# Patient Record
Sex: FEMALE | Race: WHITE | NOT HISPANIC OR LATINO | Employment: FULL TIME | URBAN - METROPOLITAN AREA
[De-identification: names, ages, dates, MRNs, and addresses within clinical notes are randomized per-mention and may not be internally consistent; named-entity substitution may affect disease eponyms.]

---

## 2018-03-05 ENCOUNTER — OFFICE VISIT (OUTPATIENT)
Dept: OBGYN CLINIC | Facility: MEDICAL CENTER | Age: 28
End: 2018-03-05
Payer: COMMERCIAL

## 2018-03-05 VITALS
DIASTOLIC BLOOD PRESSURE: 84 MMHG | BODY MASS INDEX: 30.36 KG/M2 | WEIGHT: 165 LBS | HEIGHT: 62 IN | SYSTOLIC BLOOD PRESSURE: 120 MMHG

## 2018-03-05 DIAGNOSIS — O30.041 DICHORIONIC DIAMNIOTIC TWIN PREGNANCY IN FIRST TRIMESTER: ICD-10-CM

## 2018-03-05 DIAGNOSIS — O99.210 MATERNAL OBESITY AFFECTING PREGNANCY, ANTEPARTUM: ICD-10-CM

## 2018-03-05 DIAGNOSIS — N91.2 AMENORRHEA: Primary | ICD-10-CM

## 2018-03-05 DIAGNOSIS — Z34.90 UNPLANNED PREGNANCY: ICD-10-CM

## 2018-03-05 PROBLEM — N39.0 RECURRENT UTI: Status: ACTIVE | Noted: 2017-09-07

## 2018-03-05 PROBLEM — Z34.00 SUPERVISION OF NORMAL FIRST PREGNANCY: Status: RESOLVED | Noted: 2018-02-22 | Resolved: 2018-03-05

## 2018-03-05 PROBLEM — Z34.00 SUPERVISION OF NORMAL FIRST PREGNANCY: Status: ACTIVE | Noted: 2018-02-22

## 2018-03-05 PROBLEM — R42 VERTIGO: Status: ACTIVE | Noted: 2018-02-07

## 2018-03-05 PROBLEM — H90.3: Status: ACTIVE | Noted: 2018-02-07

## 2018-03-05 PROBLEM — K08.9 POOR DENTITION: Status: ACTIVE | Noted: 2018-03-05

## 2018-03-05 PROCEDURE — 76817 TRANSVAGINAL US OBSTETRIC: CPT | Performed by: NURSE PRACTITIONER

## 2018-03-05 PROCEDURE — 99213 OFFICE O/P EST LOW 20 MIN: CPT | Performed by: NURSE PRACTITIONER

## 2018-03-05 RX ORDER — SERTRALINE HYDROCHLORIDE 100 MG/1
100 TABLET, FILM COATED ORAL DAILY
Refills: 5 | COMMUNITY
Start: 2018-01-03 | End: 2018-08-20

## 2018-03-05 RX ORDER — CYCLOBENZAPRINE HCL 10 MG
10 TABLET ORAL
Refills: 1 | COMMUNITY
Start: 2018-01-03 | End: 2018-03-05 | Stop reason: ALTCHOICE

## 2018-03-05 RX ORDER — ASPIRIN 81 MG/1
TABLET ORAL
COMMUNITY
Start: 2018-02-22 | End: 2018-08-07 | Stop reason: SDDI

## 2018-03-05 RX ORDER — FLUTICASONE PROPIONATE 50 MCG
2 SPRAY, SUSPENSION (ML) NASAL
COMMUNITY
Start: 2017-09-07

## 2018-03-05 RX ORDER — CETIRIZINE HYDROCHLORIDE 10 MG/1
10 TABLET ORAL
COMMUNITY
Start: 2016-08-30

## 2018-03-05 RX ORDER — ALBUTEROL SULFATE 90 UG/1
AEROSOL, METERED RESPIRATORY (INHALATION)
COMMUNITY
Start: 2017-03-31

## 2018-03-05 RX ORDER — VITAMIN A ACETATE, .BETA.-CAROTENE, ASCORBIC ACID, CHOLECALCIFEROL, .ALPHA.-TOCOPHEROL ACETATE, DL-, THIAMINE MONONITRATE, RIBOFLAVIN, NIACINAMIDE, PYRIDOXINE HYDROCHLORIDE, FOLIC ACID, CYANOCOBALAMIN, CALCIUM CARBONATE, FERROUS FUMARATE, ZINC OXIDE, AND CUPRIC OXIDE 2000; 2000; 120; 400; 22; 1.84; 3; 20; 10; 1; 12; 200; 27; 25; 2 [IU]/1; [IU]/1; MG/1; [IU]/1; MG/1; MG/1; MG/1; MG/1; MG/1; MG/1; UG/1; MG/1; MG/1; MG/1; MG/1
TABLET ORAL
COMMUNITY
End: 2022-06-24 | Stop reason: ALTCHOICE

## 2018-03-05 RX ORDER — HYDROXYZINE HYDROCHLORIDE 10 MG/1
10 TABLET, FILM COATED ORAL
COMMUNITY
Start: 2017-03-31 | End: 2018-08-20

## 2018-03-05 RX ORDER — SULFAMETHOXAZOLE AND TRIMETHOPRIM 800; 160 MG/1; MG/1
TABLET ORAL
Refills: 0 | COMMUNITY
Start: 2018-01-03 | End: 2018-03-05 | Stop reason: ALTCHOICE

## 2018-03-05 RX ORDER — SERTRALINE HYDROCHLORIDE 100 MG/1
TABLET, FILM COATED ORAL
COMMUNITY
Start: 2018-01-03 | End: 2018-03-14 | Stop reason: SDUPTHER

## 2018-03-05 RX ORDER — MONTELUKAST SODIUM 10 MG/1
10 TABLET ORAL
COMMUNITY
Start: 2017-09-07 | End: 2018-08-20

## 2018-03-05 NOTE — PROGRESS NOTES
EARLY PREGNANCY ULTRASOUND    SUBJECTIVE    HPI: Irene Og is a 28 y o  primigravida new patient female here today for early pregnancy ultrasound  She is accompanied by Lionel ALVES  Patient's last menstrual period was 12/23/2017 (exact date)  Menses are regular  This pregnancy was unplanned but welcomed  PMHx is significant for obesity & depression  Unremarkable PSHx  Denies a family history of birth defects or intellectual defects, cousin did have spina bifida  Did have varicella as a child  Taking a prenatal vitamin  Had been taking sertraline for depression as Rx'd by her PCP however stopped cold turkey 1 month ago due to pregnancy and says she is doing fine without it  Currently works at [employer], will be starting at [employer] soon  Symptoms of pregnancy: nausea  Denies history of STI or abnormal pap smear      Allergies   Allergen Reactions    Poison Ivy Extract        Current Outpatient Prescriptions:     cetirizine (ZyrTEC) 10 mg tablet, Take 10 mg by mouth, Disp: , Rfl:     hydrOXYzine HCL (ATARAX) 10 mg tablet, Take 10 mg by mouth, Disp: , Rfl:     montelukast (SINGULAIR) 10 mg tablet, Take 10 mg by mouth, Disp: , Rfl:     Prenatal Vit-Fe Fumarate-FA (PNV PRENATAL PLUS MULTIVITAMIN) 27-1 MG TABS, Take by mouth, Disp: , Rfl:     albuterol (PROVENTIL HFA,VENTOLIN HFA) 90 mcg/act inhaler, Two puffs every four hours as needed for wheezing, Disp: , Rfl:     aspirin (ECOTRIN LOW STRENGTH) 81 mg EC tablet, , Disp: , Rfl:     fluticasone (FLONASE) 50 mcg/act nasal spray, 2 sprays into each nostril, Disp: , Rfl:     sertraline (ZOLOFT) 100 mg tablet, 1 by mouth once daily, Disp: , Rfl:         OBJECTIVE  Vitals:    03/05/18 1039   BP: 120/84   BP Location: Left arm   Patient Position: Sitting   Weight: 74 8 kg (165 lb)   Height: 5' 2" (1 575 m)         Early OB Ultrasound Procedure Note: Transvaginal US    Technician: Study performed by the interpreting NP    Indications:  Early gestation, dating & viability    Procedure Details   The entire study was done at settings of 6 0 to 8 0 MHz  TWIN A  Gestational Sac: Present  Yolk sac: Present  Crown-rump length is 3 08cm and calculates to an estimated gestational age of 9 weeks, 0 days  Embryonic cardiac activity is seen at a rate of 171 b/min  Description of fetal anatomy Normal     TWIN B  Gestational Sac: Present  Yolk sac: Present  Crown-rump length is 2 91cm and calculates to an estimated gestational age of 10 weeks, 5 days  Embryonic cardiac activity is seen at a rate of 171 b/min  Description of fetal anatomy Normal     Cul-de-sac: no fluid  Description of ovaries: CL on right ovary  L appears normal   Description of uterus: Anteverted in position  Description of cervix: Appears normal    Findings:  Viable twin intrauterine pregnancy  Appears dichorionic, diamniotic (thick intertwin membrane)      ASSESSMENT  Early TWIN (surprise!) pregnancy at 10 weeks 2 days with a calculated SHUKRI of 9/29/2018 based on LMP      PLAN  1 - Referral to MFM provided today to discuss genetic screening options for fetuses, along with indications of (1) twin pregnancy and confirm chorionicity; (2) maternal obesity  2 - RTO for OB interview and PN-1 visit        All questions were answered & the couple expressed understanding      Austin Davis

## 2018-03-06 ENCOUNTER — TELEPHONE (OUTPATIENT)
Dept: OBGYN CLINIC | Facility: CLINIC | Age: 28
End: 2018-03-06

## 2018-03-06 NOTE — TELEPHONE ENCOUNTER
Patient called, she is 10 weeks pregnant and said she had cramping  She has been constipated and just had a bowel movement and feels better but still is crampy  She said she is not drinking as much as she should  She was seen yesterday and had an Us, pregnant with twins  She is suppose to see [facility] tomorrow  I told her to go home, rest, hydrate and call us if any increase in her cramping

## 2018-03-12 ENCOUNTER — INITIAL PRENATAL (OUTPATIENT)
Dept: OBGYN CLINIC | Facility: CLINIC | Age: 28
End: 2018-03-12

## 2018-03-12 VITALS — DIASTOLIC BLOOD PRESSURE: 70 MMHG | BODY MASS INDEX: 30.18 KG/M2 | SYSTOLIC BLOOD PRESSURE: 122 MMHG | WEIGHT: 165 LBS

## 2018-03-12 DIAGNOSIS — O30.001 TWIN GESTATION IN FIRST TRIMESTER, UNSPECIFIED MULTIPLE GESTATION TYPE: ICD-10-CM

## 2018-03-12 DIAGNOSIS — Z34.90 UNPLANNED PREGNANCY: ICD-10-CM

## 2018-03-12 DIAGNOSIS — Z34.01 ENCOUNTER FOR SUPERVISION OF NORMAL FIRST PREGNANCY IN FIRST TRIMESTER: Primary | ICD-10-CM

## 2018-03-12 PROCEDURE — OBC: Performed by: OBSTETRICS & GYNECOLOGY

## 2018-03-12 NOTE — PROGRESS NOTES
Pt and her Mother seen in the office for OB intake  Pregnancy was unplanned, TWINs a surprise! FOB is involved and pt states family is accepting  Pt recently left job at [employer] and has started works at [employer] with Mother - they only have 1 car and transportation is a barrier  Pt reports current depression sx, but d/cd medication with pregnancy, states she is managing fine without  She has a pending apt with MFM this week for u/s - she is interested in genetic testing given maternal family hx of Spina Bifida  All information was presented and all questions were answered  Routine labwork ordered  Pt plans to try breast feeding  She was offered and advised to have flu vaccine and did decline

## 2018-03-14 ENCOUNTER — ULTRASOUND (OUTPATIENT)
Dept: PERINATAL CARE | Facility: CLINIC | Age: 28
End: 2018-03-14
Payer: COMMERCIAL

## 2018-03-14 VITALS
SYSTOLIC BLOOD PRESSURE: 129 MMHG | DIASTOLIC BLOOD PRESSURE: 63 MMHG | WEIGHT: 164.2 LBS | HEART RATE: 97 BPM | BODY MASS INDEX: 30.22 KG/M2 | HEIGHT: 62 IN

## 2018-03-14 DIAGNOSIS — O30.041 DICHORIONIC DIAMNIOTIC TWIN PREGNANCY IN FIRST TRIMESTER: ICD-10-CM

## 2018-03-14 DIAGNOSIS — Z3A.11 11 WEEKS GESTATION OF PREGNANCY: Primary | ICD-10-CM

## 2018-03-14 DIAGNOSIS — O99.210 MATERNAL OBESITY AFFECTING PREGNANCY, ANTEPARTUM: ICD-10-CM

## 2018-03-14 PROCEDURE — 76801 OB US < 14 WKS SINGLE FETUS: CPT | Performed by: OBSTETRICS & GYNECOLOGY

## 2018-03-14 PROCEDURE — 99241 PR OFFICE CONSULTATION NEW/ESTAB PATIENT 15 MIN: CPT | Performed by: OBSTETRICS & GYNECOLOGY

## 2018-03-14 NOTE — PROGRESS NOTES
Dichorionic diamniotic twin pregnancy  Please see her ultrasound report under the  ob procedure tab    Urbano Feliciano MD

## 2018-03-20 ENCOUNTER — INITIAL PRENATAL (OUTPATIENT)
Dept: OBGYN CLINIC | Facility: MEDICAL CENTER | Age: 28
End: 2018-03-20

## 2018-03-20 ENCOUNTER — TELEPHONE (OUTPATIENT)
Dept: OBGYN CLINIC | Facility: CLINIC | Age: 28
End: 2018-03-20

## 2018-03-20 VITALS — BODY MASS INDEX: 29.96 KG/M2 | DIASTOLIC BLOOD PRESSURE: 72 MMHG | WEIGHT: 163.8 LBS | SYSTOLIC BLOOD PRESSURE: 144 MMHG

## 2018-03-20 DIAGNOSIS — O30.041 DICHORIONIC DIAMNIOTIC TWIN PREGNANCY IN FIRST TRIMESTER: Primary | ICD-10-CM

## 2018-03-20 PROCEDURE — PNV: Performed by: OBSTETRICS & GYNECOLOGY

## 2018-03-20 PROCEDURE — G0145 SCR C/V CYTO,THINLAYER,RESCR: HCPCS | Performed by: OBSTETRICS & GYNECOLOGY

## 2018-03-20 NOTE — TELEPHONE ENCOUNTER
Pt would like a call today- she has been having pain near her kidneys all day, helen  On right side, feels constipated and thinks might have a uti  Seen today in [place]-feels her concerns were not addressed at the appt

## 2018-03-20 NOTE — PROGRESS NOTES
Seen by perinatology   still also go for labs   has some dysuria   discuss twin gestation with patient   will start her calcium iron and folic acid    Andrews Larson is here for her first prenatal visit  Age: 28 y o  LMP: Patient's last menstrual period was 2017 (exact date)  Gestational age 14w4d based on LMP Yes , early US No   1  Para 0         Previous C Section: No  She denies nausea and vomiting  She has had no vaginal bleeding  Patients has no complaints  She  is planning consultation with maternal fetal medicine for a sequential screen and genetic screening  Allergies: Poison ivy extract  Medication use :   Current Outpatient Prescriptions   Medication Sig Dispense Refill    albuterol (PROVENTIL HFA,VENTOLIN HFA) 90 mcg/act inhaler Two puffs every four hours as needed for wheezing      aspirin (ECOTRIN LOW STRENGTH) 81 mg EC tablet       cetirizine (ZyrTEC) 10 mg tablet Take 10 mg by mouth      fluticasone (FLONASE) 50 mcg/act nasal spray 2 sprays into each nostril      hydrOXYzine HCL (ATARAX) 10 mg tablet Take 10 mg by mouth      montelukast (SINGULAIR) 10 mg tablet Take 10 mg by mouth      Prenatal Vit-Fe Fumarate-FA (PNV PRENATAL PLUS MULTIVITAMIN) 27-1 MG TABS Take by mouth      sertraline (ZOLOFT) 100 mg tablet Take 100 mg by mouth daily  5     No current facility-administered medications for this visit  She is a non-smoker  In this pregnancy her  medical history is significant for none    Her obstetrical, medical, surgical and family history was reviewed  Her physical exam was normal  A Pap smear was obtained, Gonorrhea and Chlamydia testing was not obtained    Discussed as well during this visit was diet, prenatal vitamins, prenatal visits, lab testing, breast feeding, vaccinations, maternal fetal medicine consultations, prevention of exposure to infectious diseases and toxic chemicals, lifestyle      Risk factors for this pregnancy include: d/di twins

## 2018-03-22 ENCOUNTER — ROUTINE PRENATAL (OUTPATIENT)
Dept: PERINATAL CARE | Facility: CLINIC | Age: 28
End: 2018-03-22
Payer: COMMERCIAL

## 2018-03-22 VITALS
SYSTOLIC BLOOD PRESSURE: 129 MMHG | HEART RATE: 93 BPM | BODY MASS INDEX: 30.11 KG/M2 | DIASTOLIC BLOOD PRESSURE: 79 MMHG | WEIGHT: 163.6 LBS | HEIGHT: 62 IN

## 2018-03-22 DIAGNOSIS — Z3A.12 12 WEEKS GESTATION OF PREGNANCY: ICD-10-CM

## 2018-03-22 DIAGNOSIS — Z36.82 ENCOUNTER FOR ANTENATAL SCREENING FOR NUCHAL TRANSLUCENCY: ICD-10-CM

## 2018-03-22 DIAGNOSIS — O30.041 DICHORIONIC DIAMNIOTIC TWIN PREGNANCY IN FIRST TRIMESTER: Primary | ICD-10-CM

## 2018-03-22 PROCEDURE — 76802 OB US < 14 WKS ADDL FETUS: CPT | Performed by: OBSTETRICS & GYNECOLOGY

## 2018-03-22 PROCEDURE — 76813 OB US NUCHAL MEAS 1 GEST: CPT | Performed by: OBSTETRICS & GYNECOLOGY

## 2018-03-22 PROCEDURE — 76801 OB US < 14 WKS SINGLE FETUS: CPT | Performed by: OBSTETRICS & GYNECOLOGY

## 2018-03-22 PROCEDURE — 76814 OB US NUCHAL MEAS ADD-ON: CPT | Performed by: OBSTETRICS & GYNECOLOGY

## 2018-03-22 PROCEDURE — 99212 OFFICE O/P EST SF 10 MIN: CPT | Performed by: OBSTETRICS & GYNECOLOGY

## 2018-03-22 NOTE — PROGRESS NOTES
Please refer to the [facility] ultrasound report in Ob Procedures for additional information regarding the visit to the [facility]  today

## 2018-03-22 NOTE — TELEPHONE ENCOUNTER
Called pt -L/M for pt to call office   Advised pt call office to inform if she has received care at a [facility] center, or if pain has resolved itself

## 2018-03-23 LAB
LAB AP GYN PRIMARY INTERPRETATION: NORMAL
LAB AP LMP: NORMAL
Lab: NORMAL

## 2018-04-12 LAB
# FETUSES US: 2
AGE: NORMAL
COLLECT DATE: NORMAL
CURRENT SMOKER: NO
DONATED EGG PATIENT QL: NO
FET CRL US.MEAS: 61 MM
FET CRL US.MEAS: 61 MM
FET NUCHAL FOLD THICKNESS US.MEAS: 1.6 MM
FET NUCHAL FOLD THICKNESS US.MEAS: 1.7 MM
GA METHOD: NORMAL
HX OF NTD NARR: NO
HX OF TRISOMY 21 NARR: NO
IDDM PATIENT QL: NO
INTEGRATED SCN PATIENT-IMP: NORMAL
PHYSICIAN NPI: NORMAL
SL AMB NASAL BONE: PRESENT
SL AMB NTQR LOCATION ID#: NORMAL
SL AMB NTQR READING PHYS ID#: NORMAL
SL AMB REFERRING PHYSICIAN NAME: NORMAL
SL AMB REFERRING PHYSICIAN PHONE: NORMAL
SL AMB REPEAT SPECIMEN: NO
SL AMB TWIN B NASAL BONE: PRESENT
SONOGRAPHER NAME: NORMAL
SONOGRAPHER: NORMAL
SONOGRAPHER: NORMAL
US DATE: NORMAL
US FETUSES STUDY [IMP]: NORMAL

## 2018-04-13 ENCOUNTER — TELEPHONE (OUTPATIENT)
Dept: PERINATAL CARE | Facility: CLINIC | Age: 28
End: 2018-04-13

## 2018-04-17 ENCOUNTER — LAB (OUTPATIENT)
Dept: LAB | Facility: MEDICAL CENTER | Age: 28
End: 2018-04-17
Payer: COMMERCIAL

## 2018-04-17 ENCOUNTER — ROUTINE PRENATAL (OUTPATIENT)
Dept: OBGYN CLINIC | Facility: MEDICAL CENTER | Age: 28
End: 2018-04-17
Payer: COMMERCIAL

## 2018-04-17 ENCOUNTER — TELEPHONE (OUTPATIENT)
Dept: OBGYN CLINIC | Facility: CLINIC | Age: 28
End: 2018-04-17

## 2018-04-17 VITALS — WEIGHT: 163.6 LBS | BODY MASS INDEX: 29.92 KG/M2 | SYSTOLIC BLOOD PRESSURE: 114 MMHG | DIASTOLIC BLOOD PRESSURE: 72 MMHG

## 2018-04-17 DIAGNOSIS — O30.001 TWIN GESTATION IN FIRST TRIMESTER, UNSPECIFIED MULTIPLE GESTATION TYPE: ICD-10-CM

## 2018-04-17 DIAGNOSIS — O30.042 DICHORIONIC DIAMNIOTIC TWIN PREGNANCY IN SECOND TRIMESTER: Primary | ICD-10-CM

## 2018-04-17 DIAGNOSIS — Z34.01 ENCOUNTER FOR SUPERVISION OF NORMAL FIRST PREGNANCY IN FIRST TRIMESTER: ICD-10-CM

## 2018-04-17 DIAGNOSIS — Z3A.11 11 WEEKS GESTATION OF PREGNANCY: ICD-10-CM

## 2018-04-17 PROBLEM — O09.899 RUBELLA NON-IMMUNE STATUS, ANTEPARTUM: Status: ACTIVE | Noted: 2018-04-17

## 2018-04-17 PROBLEM — O30.041 DICHORIONIC DIAMNIOTIC TWIN PREGNANCY IN FIRST TRIMESTER: Status: RESOLVED | Noted: 2018-03-05 | Resolved: 2018-04-17

## 2018-04-17 PROBLEM — Z28.39 RUBELLA NON-IMMUNE STATUS, ANTEPARTUM: Status: ACTIVE | Noted: 2018-04-17

## 2018-04-17 LAB
ABO GROUP BLD: NORMAL
BACTERIA UR QL AUTO: ABNORMAL /HPF
BASOPHILS # BLD AUTO: 0.02 THOUSANDS/ΜL (ref 0–0.1)
BASOPHILS NFR BLD AUTO: 0 % (ref 0–1)
BILIRUB UR QL STRIP: NEGATIVE
BLD GP AB SCN SERPL QL: NEGATIVE
CLARITY UR: CLEAR
COLOR UR: YELLOW
EOSINOPHIL # BLD AUTO: 0.03 THOUSAND/ΜL (ref 0–0.61)
EOSINOPHIL NFR BLD AUTO: 0 % (ref 0–6)
ERYTHROCYTE [DISTWIDTH] IN BLOOD BY AUTOMATED COUNT: 13.8 % (ref 11.6–15.1)
GLUCOSE UR STRIP-MCNC: NEGATIVE MG/DL
HCT VFR BLD AUTO: 36.7 % (ref 34.8–46.1)
HGB BLD-MCNC: 12.6 G/DL (ref 11.5–15.4)
HGB UR QL STRIP.AUTO: NEGATIVE
KETONES UR STRIP-MCNC: NEGATIVE MG/DL
LEUKOCYTE ESTERASE UR QL STRIP: NEGATIVE
LYMPHOCYTES # BLD AUTO: 1.11 THOUSANDS/ΜL (ref 0.6–4.47)
LYMPHOCYTES NFR BLD AUTO: 11 % (ref 14–44)
MCH RBC QN AUTO: 30.4 PG (ref 26.8–34.3)
MCHC RBC AUTO-ENTMCNC: 34.3 G/DL (ref 31.4–37.4)
MCV RBC AUTO: 89 FL (ref 82–98)
MONOCYTES # BLD AUTO: 0.44 THOUSAND/ΜL (ref 0.17–1.22)
MONOCYTES NFR BLD AUTO: 4 % (ref 4–12)
NEUTROPHILS # BLD AUTO: 8.72 THOUSANDS/ΜL (ref 1.85–7.62)
NEUTS SEG NFR BLD AUTO: 85 % (ref 43–75)
NITRITE UR QL STRIP: NEGATIVE
NON-SQ EPI CELLS URNS QL MICRO: ABNORMAL /HPF
NRBC BLD AUTO-RTO: 0 /100 WBCS
PH UR STRIP.AUTO: 6.5 [PH] (ref 4.5–8)
PLATELET # BLD AUTO: 195 THOUSANDS/UL (ref 149–390)
PMV BLD AUTO: 11.9 FL (ref 8.9–12.7)
PROT UR STRIP-MCNC: NEGATIVE MG/DL
RBC # BLD AUTO: 4.14 MILLION/UL (ref 3.81–5.12)
RBC #/AREA URNS AUTO: ABNORMAL /HPF
RH BLD: POSITIVE
RUBV IGG SERPL IA-ACNC: 9.5 IU/ML
SP GR UR STRIP.AUTO: 1.02 (ref 1–1.03)
SPECIMEN EXPIRATION DATE: NORMAL
UROBILINOGEN UR QL STRIP.AUTO: 0.2 E.U./DL
WBC # BLD AUTO: 10.37 THOUSAND/UL (ref 4.31–10.16)
WBC #/AREA URNS AUTO: ABNORMAL /HPF

## 2018-04-17 PROCEDURE — 36415 COLL VENOUS BLD VENIPUNCTURE: CPT

## 2018-04-17 PROCEDURE — 86706 HEP B SURFACE ANTIBODY: CPT

## 2018-04-17 PROCEDURE — 80081 OBSTETRIC PANEL INC HIV TSTG: CPT

## 2018-04-17 PROCEDURE — 99213 OFFICE O/P EST LOW 20 MIN: CPT | Performed by: OBSTETRICS & GYNECOLOGY

## 2018-04-17 PROCEDURE — 81001 URINALYSIS AUTO W/SCOPE: CPT

## 2018-04-17 PROCEDURE — 87086 URINE CULTURE/COLONY COUNT: CPT

## 2018-04-17 NOTE — PROGRESS NOTES
Please call the patient regarding her abnormal result  Rubella immune in pregnancy - please review transmission precautions  Recommend vaccination postpartum  Thanks!

## 2018-04-17 NOTE — TELEPHONE ENCOUNTER
----- Message from Amado Wells sent at 4/17/2018  4:23 PM EDT -----  Please call the patient regarding her abnormal result  Rubella immune in pregnancy - please review transmission precautions  Recommend vaccination postpartum  Thanks!

## 2018-04-17 NOTE — PROGRESS NOTES
Just started a new job at [employer] and they need a note to say she can have snacks at her station  Level 2 scheduled

## 2018-04-17 NOTE — LETTER
April 17, 2018     Patient: Lou Ruff   YOB: 1990   Date of Visit: 4/17/2018       To Whom it May Concern:    Kurt Goodpasture is under my professional care  She was seen in my office on 4/17/2018  Due to her ongoing pregnancy she is advised to eat frequently  She should be allowed to have snack at her work station  If you have any questions or concerns, please don't hesitate to call           Sincerely,          Imelda Rojo MD        CC: No Recipients

## 2018-04-17 NOTE — PROGRESS NOTES
Patient is doing well she has not gone for lab test at   a note was written for work to have snacks at her workstation   dealing with stress,  In past had trouble using Zoloft due to stomach upset    I suggested psychology services

## 2018-04-18 ENCOUNTER — TELEPHONE (OUTPATIENT)
Dept: OBGYN CLINIC | Facility: CLINIC | Age: 28
End: 2018-04-18

## 2018-04-18 DIAGNOSIS — Z34.90 PREGNANCY, UNSPECIFIED GESTATIONAL AGE: Primary | ICD-10-CM

## 2018-04-18 LAB
BACTERIA UR CULT: NORMAL
HBV SURFACE AB SER-ACNC: <3.1 MIU/ML
HIV 1+2 AB+HIV1 P24 AG SERPL QL IA: NORMAL
RPR SER QL: NORMAL

## 2018-04-18 NOTE — TELEPHONE ENCOUNTER
----- Message from [facility] sent at 4/18/2018 10:08 AM EDT -----  She will need HbsAg  Thanks

## 2018-04-19 LAB — HBV SURFACE AG SER QL: NORMAL

## 2018-04-27 ENCOUNTER — TELEPHONE (OUTPATIENT)
Dept: OBGYN CLINIC | Facility: CLINIC | Age: 28
End: 2018-04-27

## 2018-04-27 NOTE — TELEPHONE ENCOUNTER
Her prenatal labs are unremarkable  Infectious disease testing is all normal/negative  The only lab of note is that she is not-immune to rubella, which Lilliana Chaudhary called her on 4/17 and left a message about (I wanted to review transmission precautions in pregnancy and encourage postpartum vaccination)  That's all  Thanks!

## 2018-04-27 NOTE — TELEPHONE ENCOUNTER
Patient called to inquire about labs done 04/17  Results available, please review and advise  Thanks!

## 2018-05-11 ENCOUNTER — TELEPHONE (OUTPATIENT)
Dept: PERINATAL CARE | Facility: CLINIC | Age: 28
End: 2018-05-11

## 2018-05-11 ENCOUNTER — TELEPHONE (OUTPATIENT)
Dept: OBGYN CLINIC | Facility: CLINIC | Age: 28
End: 2018-05-11

## 2018-05-11 NOTE — TELEPHONE ENCOUNTER
Spoke with [facility] - OB Nurse - the screen she may be wanting is the Quad screen which is offered till 21 wks and it be filled out by a provider or by nurse  Patient has her 20 wk appointment next Friday 05/18 - pt stated she will get the lab order at that time

## 2018-05-11 NOTE — TELEPHONE ENCOUNTER
Patient called for her 28 wk lab results - she is aware of them and the precautions  Will get the injection post partum

## 2018-05-14 ENCOUNTER — ROUTINE PRENATAL (OUTPATIENT)
Dept: PERINATAL CARE | Facility: CLINIC | Age: 28
End: 2018-05-14
Payer: COMMERCIAL

## 2018-05-14 VITALS
HEIGHT: 62 IN | WEIGHT: 168.8 LBS | BODY MASS INDEX: 31.06 KG/M2 | SYSTOLIC BLOOD PRESSURE: 123 MMHG | HEART RATE: 88 BPM | DIASTOLIC BLOOD PRESSURE: 77 MMHG

## 2018-05-14 DIAGNOSIS — Z36.86 ENCOUNTER FOR ANTENATAL SCREENING FOR CERVICAL LENGTH: ICD-10-CM

## 2018-05-14 DIAGNOSIS — Z3A.20 20 WEEKS GESTATION OF PREGNANCY: ICD-10-CM

## 2018-05-14 DIAGNOSIS — O30.042 DICHORIONIC DIAMNIOTIC TWIN PREGNANCY IN SECOND TRIMESTER: Primary | ICD-10-CM

## 2018-05-14 DIAGNOSIS — Z28.39 RUBELLA NON-IMMUNE STATUS, ANTEPARTUM: ICD-10-CM

## 2018-05-14 DIAGNOSIS — O09.899 RUBELLA NON-IMMUNE STATUS, ANTEPARTUM: ICD-10-CM

## 2018-05-14 PROCEDURE — 76812 OB US DETAILED ADDL FETUS: CPT | Performed by: OBSTETRICS & GYNECOLOGY

## 2018-05-14 PROCEDURE — 76817 TRANSVAGINAL US OBSTETRIC: CPT | Performed by: OBSTETRICS & GYNECOLOGY

## 2018-05-14 PROCEDURE — 76811 OB US DETAILED SNGL FETUS: CPT | Performed by: OBSTETRICS & GYNECOLOGY

## 2018-05-14 PROCEDURE — 99212 OFFICE O/P EST SF 10 MIN: CPT | Performed by: OBSTETRICS & GYNECOLOGY

## 2018-05-14 NOTE — PROGRESS NOTES
A transvaginal ultrasound was performed  Sonographer note on use of High Level Disinfection Process (Trophon) for transvaginal probe# 5 used, serial [id number]    Lenora Ariza

## 2018-05-15 PROBLEM — O30.042 DICHORIONIC DIAMNIOTIC TWIN PREGNANCY IN SECOND TRIMESTER: Status: ACTIVE | Noted: 2018-05-15

## 2018-05-15 PROBLEM — O99.341 BIPOLAR DISEASE DURING PREGNANCY IN FIRST TRIMESTER (HCC): Status: ACTIVE | Noted: 2018-04-20

## 2018-05-15 PROBLEM — F31.9 BIPOLAR DISEASE DURING PREGNANCY IN FIRST TRIMESTER (HCC): Status: ACTIVE | Noted: 2018-04-20

## 2018-05-18 ENCOUNTER — ROUTINE PRENATAL (OUTPATIENT)
Dept: OBGYN CLINIC | Facility: MEDICAL CENTER | Age: 28
End: 2018-05-18
Payer: COMMERCIAL

## 2018-05-18 VITALS — DIASTOLIC BLOOD PRESSURE: 70 MMHG | WEIGHT: 171.6 LBS | SYSTOLIC BLOOD PRESSURE: 114 MMHG | BODY MASS INDEX: 31.39 KG/M2

## 2018-05-18 DIAGNOSIS — O30.042 DICHORIONIC DIAMNIOTIC TWIN PREGNANCY IN SECOND TRIMESTER: Primary | ICD-10-CM

## 2018-05-18 PROCEDURE — 99213 OFFICE O/P EST LOW 20 MIN: CPT | Performed by: OBSTETRICS & GYNECOLOGY

## 2018-05-18 NOTE — ASSESSMENT & PLAN NOTE
Feels well  SOB slowly improving  Advised regular use of allergy meds and inhaler PRN  Discussed normal physiologic changes in pregnancy  Denies ctx  Not working anymore

## 2018-05-18 NOTE — PROGRESS NOTES
abd u/s:    Vertex/transverse  Fetal movement X 2 noted  Cardiac activity X 2 noted  Problem List Items Addressed This Visit        Other    Dichorionic diamniotic twin pregnancy in second trimester - Primary     Feels well  SOB slowly improving  Advised regular use of allergy meds and inhaler PRN  Discussed normal physiologic changes in pregnancy  Denies ctx  Not working anymore

## 2018-05-22 ENCOUNTER — TELEPHONE (OUTPATIENT)
Dept: PERINATAL CARE | Facility: CLINIC | Age: 28
End: 2018-05-22

## 2018-05-22 LAB
# FETUSES US: 2
AFP ADJ MOM SERPL: 2.73
AFP SERPL-MCNC: 168.5 NG/ML
AGE: NORMAL
B-HCG ADJ MOM SERPL: 5.76
B-HCG SERPL-ACNC: 97.43 IU/ML
CURRENT SMOKER: NO
DONATED EGG PATIENT QL: NO
FET TS 21 RISK FROM MAT AGE: NORMAL
GA CLIN EST: 21.3 WEEKS
GA METHOD: NORMAL
HX OF NTD NARR: NO
HX OF TRISOMY 21 NARR: NO
IDDM PATIENT QL: NO
INHIBIN A ADJ MOM SERPL: 2.87
INHIBIN A SERPL-MCNC: 579 PG/ML
NEURAL TUBE DEFECT RISK FETUS: NORMAL %
SL AMB REPEAT SPECIMEN: NO
TS 18 RISK FETUS: NORMAL
TS 21 RISK FETUS: NORMAL
U ESTRIOL ADJ MOM SERPL: 1.64
U ESTRIOL SERPL-MCNC: 3.33 NG/ML

## 2018-05-22 NOTE — TELEPHONE ENCOUNTER
----- Message from Warren Lazaro MD sent at 5/22/2018 11:31 AM EDT -----  I have reviewed the results which are normal   Please call patient and notify her of normal results  Thank you

## 2018-06-11 ENCOUNTER — ULTRASOUND (OUTPATIENT)
Dept: PERINATAL CARE | Facility: CLINIC | Age: 28
End: 2018-06-11
Payer: COMMERCIAL

## 2018-06-11 VITALS
WEIGHT: 170.8 LBS | BODY MASS INDEX: 31.43 KG/M2 | SYSTOLIC BLOOD PRESSURE: 123 MMHG | DIASTOLIC BLOOD PRESSURE: 84 MMHG | HEIGHT: 62 IN | HEART RATE: 87 BPM

## 2018-06-11 DIAGNOSIS — Z3A.24 24 WEEKS GESTATION OF PREGNANCY: ICD-10-CM

## 2018-06-11 DIAGNOSIS — O30.042 DICHORIONIC DIAMNIOTIC TWIN PREGNANCY IN SECOND TRIMESTER: Primary | ICD-10-CM

## 2018-06-11 PROCEDURE — 76816 OB US FOLLOW-UP PER FETUS: CPT | Performed by: OBSTETRICS & GYNECOLOGY

## 2018-06-11 PROCEDURE — 99212 OFFICE O/P EST SF 10 MIN: CPT | Performed by: OBSTETRICS & GYNECOLOGY

## 2018-06-11 NOTE — PROGRESS NOTES
Please refer to the [facility] ultrasound report in Ob Procedures for additional information regarding the visit to the [facility]  today

## 2018-06-11 NOTE — LETTER
June 11, 2018     Lui Faulkner, DO  [address] [facility] [address]    Patient: Carin Mata   YOB: 1990   Date of Visit: 6/11/2018       Dear Dr Ximena Maddox: Thank you for referring Kurt Goodpasture to me for evaluation  Below are my notes for this consultation  If you have questions, please do not hesitate to call me  I look forward to following your patient along with you  Sincerely,        Majo Gomez MD        CC: No Recipients  Majo Gomez MD  6/11/2018 11:55 AM  Sign at close encounter  Please refer to the [facility] ultrasound report in Ob Procedures for additional information regarding the visit to the [facility]  today

## 2018-06-11 NOTE — LETTER
June 12, 2018     Adelfo Bonner DO  [address] [address]    Patient: Evie Mata   YOB: 1990   Date of Visit: 6/11/2018       Dear Dr Megan Carter: Thank you for referring Paul Arellano to me for evaluation  Below are my notes for this consultation  If you have questions, please do not hesitate to call me  I look forward to following your patient along with you  Sincerely,        Jt Johnson MD        CC: No Recipients  Jt Johnson MD  6/11/2018 11:55 AM  Sign at close encounter  Please refer to the [facility] ultrasound report in Ob Procedures for additional information regarding the visit to the [facility]  today

## 2018-06-20 ENCOUNTER — TELEPHONE (OUTPATIENT)
Dept: OBGYN CLINIC | Facility: CLINIC | Age: 28
End: 2018-06-20

## 2018-06-22 ENCOUNTER — ROUTINE PRENATAL (OUTPATIENT)
Dept: OBGYN CLINIC | Facility: MEDICAL CENTER | Age: 28
End: 2018-06-22
Payer: COMMERCIAL

## 2018-06-22 VITALS — DIASTOLIC BLOOD PRESSURE: 80 MMHG | SYSTOLIC BLOOD PRESSURE: 140 MMHG | BODY MASS INDEX: 32.08 KG/M2 | WEIGHT: 175.4 LBS

## 2018-06-22 DIAGNOSIS — Z3A.25 25 WEEKS GESTATION OF PREGNANCY: ICD-10-CM

## 2018-06-22 DIAGNOSIS — O30.042 DICHORIONIC DIAMNIOTIC TWIN PREGNANCY IN SECOND TRIMESTER: Primary | ICD-10-CM

## 2018-06-22 PROCEDURE — 99213 OFFICE O/P EST LOW 20 MIN: CPT | Performed by: OBSTETRICS & GYNECOLOGY

## 2018-06-22 NOTE — PROGRESS NOTES
Problem List Items Addressed This Visit        Other    Dichorionic diamniotic twin pregnancy in second trimester - Primary     Fetal movement x 2 seen  Baby A (boy) - Vertex  Baby B (girl) - Breech  Last scan 6 11 - next in 4wks    Briefly discussed routes of delivery  28wk lab slip provided           Other Visit Diagnoses     25 weeks gestation of pregnancy        Relevant Orders    CBC and differential    Glucose, 1H PG    RPR        Diogenes Garza feels well  No concerns today  Babies moving well

## 2018-06-22 NOTE — ASSESSMENT & PLAN NOTE
Fetal movement x 2 seen  Baby A (boy) - Vertex  Baby B (girl) - Breech  Last scan 6 11 - next in 4wks    Briefly discussed routes of delivery    28wk lab slip provided

## 2018-06-27 ENCOUNTER — APPOINTMENT (OUTPATIENT)
Dept: LAB | Facility: MEDICAL CENTER | Age: 28
End: 2018-06-27
Payer: COMMERCIAL

## 2018-06-27 DIAGNOSIS — Z3A.25 25 WEEKS GESTATION OF PREGNANCY: ICD-10-CM

## 2018-06-27 LAB
BASOPHILS # BLD MANUAL: 0 THOUSAND/UL (ref 0–0.1)
BASOPHILS NFR MAR MANUAL: 0 % (ref 0–1)
EOSINOPHIL # BLD MANUAL: 0 THOUSAND/UL (ref 0–0.4)
EOSINOPHIL NFR BLD MANUAL: 0 % (ref 0–6)
ERYTHROCYTE [DISTWIDTH] IN BLOOD BY AUTOMATED COUNT: 13.3 % (ref 11.6–15.1)
GLUCOSE 1H P 50 G GLC PO SERPL-MCNC: 104 MG/DL
HCT VFR BLD AUTO: 34.5 % (ref 34.8–46.1)
HGB BLD-MCNC: 11.2 G/DL (ref 11.5–15.4)
LYMPHOCYTES # BLD AUTO: 0.72 THOUSAND/UL (ref 0.6–4.47)
LYMPHOCYTES # BLD AUTO: 6 % (ref 14–44)
MCH RBC QN AUTO: 30.8 PG (ref 26.8–34.3)
MCHC RBC AUTO-ENTMCNC: 32.5 G/DL (ref 31.4–37.4)
MCV RBC AUTO: 95 FL (ref 82–98)
MONOCYTES # BLD AUTO: 0.72 THOUSAND/UL (ref 0–1.22)
MONOCYTES NFR BLD: 6 % (ref 4–12)
NEUTROPHILS # BLD MANUAL: 10.49 THOUSAND/UL (ref 1.85–7.62)
NEUTS SEG NFR BLD AUTO: 88 % (ref 43–75)
NRBC BLD AUTO-RTO: 0 /100 WBCS
PLATELET # BLD AUTO: 187 THOUSANDS/UL (ref 149–390)
PLATELET BLD QL SMEAR: ADEQUATE
PMV BLD AUTO: 11.8 FL (ref 8.9–12.7)
POIKILOCYTOSIS BLD QL SMEAR: PRESENT
RBC # BLD AUTO: 3.64 MILLION/UL (ref 3.81–5.12)
RBC MORPH BLD: PRESENT
WBC # BLD AUTO: 11.92 THOUSAND/UL (ref 4.31–10.16)

## 2018-06-27 PROCEDURE — 36415 COLL VENOUS BLD VENIPUNCTURE: CPT

## 2018-06-27 PROCEDURE — 85007 BL SMEAR W/DIFF WBC COUNT: CPT

## 2018-06-27 PROCEDURE — 82950 GLUCOSE TEST: CPT

## 2018-06-27 PROCEDURE — 85027 COMPLETE CBC AUTOMATED: CPT

## 2018-06-27 PROCEDURE — 86592 SYPHILIS TEST NON-TREP QUAL: CPT

## 2018-06-28 LAB — RPR SER QL: NORMAL

## 2018-07-10 ENCOUNTER — ULTRASOUND (OUTPATIENT)
Dept: PERINATAL CARE | Facility: CLINIC | Age: 28
End: 2018-07-10
Payer: COMMERCIAL

## 2018-07-10 VITALS
WEIGHT: 176 LBS | BODY MASS INDEX: 31.18 KG/M2 | DIASTOLIC BLOOD PRESSURE: 81 MMHG | SYSTOLIC BLOOD PRESSURE: 119 MMHG | HEIGHT: 63 IN | HEART RATE: 98 BPM

## 2018-07-10 DIAGNOSIS — Z3A.28 28 WEEKS GESTATION OF PREGNANCY: Primary | ICD-10-CM

## 2018-07-10 DIAGNOSIS — O09.899 RUBELLA NON-IMMUNE STATUS, ANTEPARTUM: ICD-10-CM

## 2018-07-10 DIAGNOSIS — Z28.39 RUBELLA NON-IMMUNE STATUS, ANTEPARTUM: ICD-10-CM

## 2018-07-10 DIAGNOSIS — O30.043 DICHORIONIC DIAMNIOTIC TWIN PREGNANCY IN THIRD TRIMESTER: ICD-10-CM

## 2018-07-10 PROCEDURE — 99212 OFFICE O/P EST SF 10 MIN: CPT | Performed by: OBSTETRICS & GYNECOLOGY

## 2018-07-10 PROCEDURE — 76816 OB US FOLLOW-UP PER FETUS: CPT | Performed by: OBSTETRICS & GYNECOLOGY

## 2018-07-10 NOTE — PATIENT INSTRUCTIONS

## 2018-07-12 ENCOUNTER — ROUTINE PRENATAL (OUTPATIENT)
Dept: OBGYN CLINIC | Facility: MEDICAL CENTER | Age: 28
End: 2018-07-12

## 2018-07-12 VITALS
HEIGHT: 63 IN | RESPIRATION RATE: 14 BRPM | BODY MASS INDEX: 31.5 KG/M2 | WEIGHT: 177.8 LBS | SYSTOLIC BLOOD PRESSURE: 124 MMHG | DIASTOLIC BLOOD PRESSURE: 80 MMHG

## 2018-07-12 DIAGNOSIS — O09.899 RUBELLA NON-IMMUNE STATUS, ANTEPARTUM: ICD-10-CM

## 2018-07-12 DIAGNOSIS — O30.043 DICHORIONIC DIAMNIOTIC TWIN PREGNANCY IN THIRD TRIMESTER: ICD-10-CM

## 2018-07-12 DIAGNOSIS — Z28.39 RUBELLA NON-IMMUNE STATUS, ANTEPARTUM: ICD-10-CM

## 2018-07-12 DIAGNOSIS — Z3A.28 28 WEEKS GESTATION OF PREGNANCY: Primary | ICD-10-CM

## 2018-07-12 PROCEDURE — PNV: Performed by: OBSTETRICS & GYNECOLOGY

## 2018-07-12 NOTE — PROGRESS NOTES
Patient is a 58-year-old female para 0 at 28 weeks and 5 days carrying a dichorionic diamniotic twin gestation  Review of systems is positive for fetal movement negative for loss of fluid vaginal bleeding or regular contractions

## 2018-07-26 ENCOUNTER — TELEPHONE (OUTPATIENT)
Dept: OBGYN CLINIC | Facility: CLINIC | Age: 28
End: 2018-07-26

## 2018-07-26 NOTE — TELEPHONE ENCOUNTER
Pt said she has had mild cramping since yesterday  When she walks around , cramps decrease  Babies moving well  No dsch, no other sx  Explained Bill Chaudhary to pt

## 2018-07-26 NOTE — TELEPHONE ENCOUNTER
Pt is 29 weeks pregnant w/ twins  Pt is experiencing cramping since yesterday   Pt states the cramps last for 1 min not too painful please advise pt @ [phone]

## 2018-08-03 ENCOUNTER — TELEPHONE (OUTPATIENT)
Dept: OBGYN CLINIC | Facility: CLINIC | Age: 28
End: 2018-08-03

## 2018-08-03 NOTE — TELEPHONE ENCOUNTER
Patient called she is 30weeks pregnant and would like to know if she can take hydroxidine a prescription for itching  She had taken it before she was pregnant and would like to know if she can take it now that she is pregnant    Please call back

## 2018-08-07 ENCOUNTER — ULTRASOUND (OUTPATIENT)
Dept: PERINATAL CARE | Facility: CLINIC | Age: 28
End: 2018-08-07
Payer: COMMERCIAL

## 2018-08-07 VITALS
SYSTOLIC BLOOD PRESSURE: 136 MMHG | BODY MASS INDEX: 31.82 KG/M2 | HEIGHT: 63 IN | WEIGHT: 179.6 LBS | HEART RATE: 84 BPM | DIASTOLIC BLOOD PRESSURE: 80 MMHG

## 2018-08-07 DIAGNOSIS — Z3A.32 32 WEEKS GESTATION OF PREGNANCY: ICD-10-CM

## 2018-08-07 DIAGNOSIS — O30.043 DICHORIONIC DIAMNIOTIC TWIN PREGNANCY IN THIRD TRIMESTER: ICD-10-CM

## 2018-08-07 DIAGNOSIS — Z36.89 ENCOUNTER FOR ULTRASOUND TO CHECK FETAL GROWTH: Primary | ICD-10-CM

## 2018-08-07 PROCEDURE — 76816 OB US FOLLOW-UP PER FETUS: CPT | Performed by: OBSTETRICS & GYNECOLOGY

## 2018-08-07 PROCEDURE — 99212 OFFICE O/P EST SF 10 MIN: CPT | Performed by: OBSTETRICS & GYNECOLOGY

## 2018-08-07 RX ORDER — BENZONATATE 100 MG/1
CAPSULE ORAL
COMMUNITY
Start: 2018-04-23 | End: 2018-08-20

## 2018-08-07 RX ORDER — NEBULIZER AND COMPRESSOR
EACH MISCELLANEOUS DAILY
Qty: 1 EACH | Refills: 0 | Status: SHIPPED | OUTPATIENT
Start: 2018-08-07 | End: 2018-11-05

## 2018-08-07 NOTE — PATIENT INSTRUCTIONS
Please consider purchasing a blood pressure cuff to check your blood pressure at home  This will help your providers follow your blood pressure and decide if any additional testing or monitoring is necessary  Please call for top number >= 140 or bottom number >= 90, or any symptoms of preeclampsia as below:    Preeclampsia   WHAT YOU NEED TO KNOW:   What is preeclampsia? Preeclampsia is a condition that can develop during week 20 or later of your pregnancy  Preeclampsia means you have high blood pressure and may have protein in your urine  Preeclampsia can cause mild to life-threatening health problems for you and your unborn baby  What are the signs and symptoms of preeclampsia? You may not have any symptoms  Severe preeclampsia may cause any of the following symptoms:  · Swollen face and hands    · A sudden weight gain of 5 pounds or more    · Headache    · Spotted or blurred vision     · Pain in your upper abdomen  What increases my risk for preeclampsia? · First pregnancy    · Pregnant with twins or multiples    · Personal or family history of preeclampsia or eclampsia    · Overweight    · Diabetes, high blood pressure, or kidney disease    · Age older than 40 years  How is preeclampsia diagnosed? · A blood pressure  of 140/90 mmHg or more for at least 2 readings may mean you have preeclampsia  Your blood pressure will need to be checked 1 to 2 times a week until your baby is born  · Blood tests  are done to check your liver and kidney function  You may need blood tests every week while you are pregnant  · Urine tests  are used to check for protein  You may need to give healthcare providers a urine sample at each visit  You may also need to collect your urine every time you urinate for 24 hours  How will my unborn baby be monitored? You may need to keep track of how often your baby moves or kicks over a certain amount of time   Ask your healthcare provider how to do kick counts and how often to do them  You may also need the following tests at each visit until your baby is born:  · A fetal biophysical profile  combines a nonstress test and an ultrasound of your unborn baby  The nonstress test measures changes in your baby's heartbeat when he moves  The ultrasound will show your baby's movement, growth, and how his breathing muscles are working  Healthcare providers can check the amount of fluid around your baby  The ultrasound will also show how well your baby's lungs are working  · An umbilical cord Doppler  checks blood flow through the umbilical cord  How is preeclampsia treated? · Medicines  may be given to lower your blood pressure, protect your organs, or prevent seizures  Low doses of aspirin after 12 weeks of pregnancy may be recommended if you are at high risk for preeclampsia  Aspirin may help prevent preeclampsia or problems that can happen from preeclampsia  Do not take aspirin unless directed by your healthcare provider  · Rest  as directed  Your healthcare provider may tell you to rest more often if you have mild symptoms of preeclampsia  Lie on your left side as often as you can  You may need complete bedrest if you have more severe symptoms  You may need to be in the hospital if your condition worsens  · Delivery  usually stops preeclampsia  Healthcare providers may deliver your baby right away if he is full-term (37 weeks or more)  He may need to be delivered early if you or the baby has life-threatening symptoms  What are the risks of preeclampsia? Your baby may not grow as he should and may need to be delivered early  Placental abruption can occur if the placenta pulls away from the uterus too soon  This condition is life-threatening for your baby  High blood pressure that is not controlled can lead to blood clots, kidney or liver failure, or stroke  Severe preeclampsia can cause seizures or coma  This condition is called eclampsia   Eclampsia is a life-threatening condition for you and your unborn baby  Call 911 for any of the following:   · You have a seizure  · You have severe abdominal pain with nausea and vomiting  When should I seek immediate care? · You develop a severe headache that does not go away  · You have blurred or spotted vision that does not go away  · You are bleeding from your vagina  · You have new or increased swelling in your face or hands  · You are urinating little or not at all  When should I contact my healthcare provider? · You are urinating less than usual      · You do not feel your baby's movements as often as usual     · You have questions or concerns about your condition or care  CARE AGREEMENT:   You have the right to help plan your care  Learn about your health condition and how it may be treated  Discuss treatment options with your caregivers to decide what care you want to receive  You always have the right to refuse treatment  The above information is an  only  It is not intended as medical advice for individual conditions or treatments  Talk to your doctor, nurse or pharmacist before following any medical regimen to see if it is safe and effective for you  © 2017 [employer] Information is for End User's use only and may not be sold, redistributed or otherwise used for commercial purposes  All illustrations and images included in CareNotes® are the copyrighted property of [employer]  or Andrew Mondragon

## 2018-08-07 NOTE — Clinical Note
Hi, you are seeing this patient in two days, I asked her to start checking Bps, she has some questions regarding employment  Talk to you soon

## 2018-08-07 NOTE — PROGRESS NOTES
[address]: Ms Michelle Curry was seen today at [address] [facility] for twin growth scan     See ultrasound report under "OB Procedures" tab  Concordant growth with a cephalic presenting fetus  Please don't hesitate to contact our office with any concerns or questions    Tuyet Cee MD

## 2018-08-09 ENCOUNTER — ROUTINE PRENATAL (OUTPATIENT)
Dept: OBGYN CLINIC | Facility: MEDICAL CENTER | Age: 28
End: 2018-08-09

## 2018-08-09 VITALS
BODY MASS INDEX: 33.01 KG/M2 | SYSTOLIC BLOOD PRESSURE: 130 MMHG | WEIGHT: 179.4 LBS | HEIGHT: 62 IN | RESPIRATION RATE: 14 BRPM | DIASTOLIC BLOOD PRESSURE: 80 MMHG

## 2018-08-09 DIAGNOSIS — O30.043 DICHORIONIC DIAMNIOTIC TWIN PREGNANCY IN THIRD TRIMESTER: ICD-10-CM

## 2018-08-09 DIAGNOSIS — Z28.39 RUBELLA NON-IMMUNE STATUS, ANTEPARTUM: ICD-10-CM

## 2018-08-09 DIAGNOSIS — O09.899 RUBELLA NON-IMMUNE STATUS, ANTEPARTUM: ICD-10-CM

## 2018-08-09 PROCEDURE — PNV: Performed by: OBSTETRICS & GYNECOLOGY

## 2018-08-09 NOTE — PROGRESS NOTES
Patient is a 18-year-old female, P0, at 32 weeks and 5 days with twins here for a routine prenatal visit without complaint  Review of systems is positive for fetal movement negative for loss of fluid vaginal bleeding or regular contractions  Patient requests a note stating that her pregnancy does not preclude her from working; she was provided with a note  Recent  ultrasound was reassuring

## 2018-08-15 ENCOUNTER — ROUTINE PRENATAL (OUTPATIENT)
Dept: PERINATAL CARE | Facility: CLINIC | Age: 28
End: 2018-08-15
Payer: COMMERCIAL

## 2018-08-15 VITALS
WEIGHT: 182.7 LBS | DIASTOLIC BLOOD PRESSURE: 85 MMHG | HEIGHT: 62 IN | HEART RATE: 80 BPM | SYSTOLIC BLOOD PRESSURE: 124 MMHG | BODY MASS INDEX: 33.62 KG/M2

## 2018-08-15 DIAGNOSIS — O30.043 DICHORIONIC DIAMNIOTIC TWIN PREGNANCY IN THIRD TRIMESTER: ICD-10-CM

## 2018-08-15 DIAGNOSIS — Z3A.33 33 WEEKS GESTATION OF PREGNANCY: Primary | ICD-10-CM

## 2018-08-15 PROCEDURE — 59025 FETAL NON-STRESS TEST: CPT | Performed by: OBSTETRICS & GYNECOLOGY

## 2018-08-15 PROCEDURE — 99212 OFFICE O/P EST SF 10 MIN: CPT | Performed by: OBSTETRICS & GYNECOLOGY

## 2018-08-15 PROCEDURE — 76818 FETAL BIOPHYS PROFILE W/NST: CPT | Performed by: OBSTETRICS & GYNECOLOGY

## 2018-08-15 NOTE — PROGRESS NOTES
[address]: Ms Ny Griffin was seen today at 33w4d for NST (found under the pregnancy episode) which I reviewed the RN assessment and agree, and CHICHI (see ultrasound report under OB procedures tab)  On nonstress testing for both babies, there was a late deceleration for twin B, which was followed by reassuring nonstress testing for both babies for an extended period of time  We then performed an CHICHI for both babies which was within normal limits and a BPP was normal for twin B  she can otherwise continue monitoring as previously scheduled  Please don't hesitate to contact our office with any concerns or questions    Robe Jauregui MD

## 2018-08-15 NOTE — PATIENT INSTRUCTIONS
Please continue to check your blood pressures at the pharmacy but please write down the numbers, or, purchase a blood pressure cuff and start measuring the blood pressures at home and please write them down  Top number at or above 140 or bottom number at or above 90 is elevated  If your blood pressures increase, we would recommend you get labs done  Here is a handout on preeclampsia:    Preeclampsia   WHAT YOU NEED TO KNOW:   What is preeclampsia? Preeclampsia is a condition that can develop during week 20 or later of your pregnancy  Preeclampsia means you have high blood pressure and may have protein in your urine  Preeclampsia can cause mild to life-threatening health problems for you and your unborn baby  What are the signs and symptoms of preeclampsia? You may not have any symptoms  Severe preeclampsia may cause any of the following symptoms:  · Swollen face and hands    · A sudden weight gain of 5 pounds or more    · Headache    · Spotted or blurred vision     · Pain in your upper abdomen  What increases my risk for preeclampsia? · First pregnancy    · Pregnant with twins or multiples    · Personal or family history of preeclampsia or eclampsia    · Overweight    · Diabetes, high blood pressure, or kidney disease    · Age older than 40 years  How is preeclampsia diagnosed? · A blood pressure  of 140/90 mmHg or more for at least 2 readings may mean you have preeclampsia  Your blood pressure will need to be checked 1 to 2 times a week until your baby is born  · Blood tests  are done to check your liver and kidney function  You may need blood tests every week while you are pregnant  · Urine tests  are used to check for protein  You may need to give healthcare providers a urine sample at each visit  You may also need to collect your urine every time you urinate for 24 hours  How will my unborn baby be monitored?   You may need to keep track of how often your baby moves or kicks over a certain amount of time  Ask your healthcare provider how to do kick counts and how often to do them  You may also need the following tests at each visit until your baby is born:  · A fetal biophysical profile  combines a nonstress test and an ultrasound of your unborn baby  The nonstress test measures changes in your baby's heartbeat when he moves  The ultrasound will show your baby's movement, growth, and how his breathing muscles are working  Healthcare providers can check the amount of fluid around your baby  The ultrasound will also show how well your baby's lungs are working  · An umbilical cord Doppler  checks blood flow through the umbilical cord  How is preeclampsia treated? · Medicines  may be given to lower your blood pressure, protect your organs, or prevent seizures  Low doses of aspirin after 12 weeks of pregnancy may be recommended if you are at high risk for preeclampsia  Aspirin may help prevent preeclampsia or problems that can happen from preeclampsia  Do not take aspirin unless directed by your healthcare provider  · Rest  as directed  Your healthcare provider may tell you to rest more often if you have mild symptoms of preeclampsia  Lie on your left side as often as you can  You may need complete bedrest if you have more severe symptoms  You may need to be in the hospital if your condition worsens  · Delivery  usually stops preeclampsia  Healthcare providers may deliver your baby right away if he is full-term (37 weeks or more)  He may need to be delivered early if you or the baby has life-threatening symptoms  What are the risks of preeclampsia? Your baby may not grow as he should and may need to be delivered early  Placental abruption can occur if the placenta pulls away from the uterus too soon  This condition is life-threatening for your baby  High blood pressure that is not controlled can lead to blood clots, kidney or liver failure, or stroke   Severe preeclampsia can cause seizures or coma  This condition is called eclampsia  Eclampsia is a life-threatening condition for you and your unborn baby  Call 911 for any of the following:   · You have a seizure  · You have severe abdominal pain with nausea and vomiting  When should I seek immediate care? · You develop a severe headache that does not go away  · You have blurred or spotted vision that does not go away  · You are bleeding from your vagina  · You have new or increased swelling in your face or hands  · You are urinating little or not at all  When should I contact my healthcare provider? · You are urinating less than usual      · You do not feel your baby's movements as often as usual     · You have questions or concerns about your condition or care  CARE AGREEMENT:   You have the right to help plan your care  Learn about your health condition and how it may be treated  Discuss treatment options with your caregivers to decide what care you want to receive  You always have the right to refuse treatment  The above information is an  only  It is not intended as medical advice for individual conditions or treatments  Talk to your doctor, nurse or pharmacist before following any medical regimen to see if it is safe and effective for you  © 2017 [address] Information is for End User's use only and may not be sold, redistributed or otherwise used for commercial purposes  All illustrations and images included in CareNotes® are the copyrighted property of [employer]  or [employer]ragon

## 2018-08-20 ENCOUNTER — ROUTINE PRENATAL (OUTPATIENT)
Dept: OBGYN CLINIC | Facility: MEDICAL CENTER | Age: 28
End: 2018-08-20

## 2018-08-20 VITALS — WEIGHT: 184.6 LBS | BODY MASS INDEX: 33.76 KG/M2 | SYSTOLIC BLOOD PRESSURE: 128 MMHG | DIASTOLIC BLOOD PRESSURE: 70 MMHG

## 2018-08-20 DIAGNOSIS — O30.043 DICHORIONIC DIAMNIOTIC TWIN PREGNANCY IN THIRD TRIMESTER: Primary | ICD-10-CM

## 2018-08-20 PROCEDURE — PNV: Performed by: PHYSICIAN ASSISTANT

## 2018-08-20 RX ORDER — ALBUTEROL SULFATE 90 UG/1
AEROSOL, METERED RESPIRATORY (INHALATION)
COMMUNITY
End: 2018-08-20

## 2018-08-20 NOTE — PROGRESS NOTES
Patient c/o hip pain - given slip for hip brace  (+) good fetal movement, denies any bleeding, fluid leakage or ctx  On US today baby a vertex, baby b transverse  Movement and FHR x 2      Problem List Items Addressed This Visit     Dichorionic diamniotic twin pregnancy in third trimester - Primary     RTO 2 wks  GBS next visit  Reviewed labor precautions, baby a vertex, baby b transverse, fetal kick counts and reasons to call

## 2018-08-20 NOTE — ASSESSMENT & PLAN NOTE
RTO 2 wks  GBS next visit  Reviewed labor precautions, baby a vertex, baby b transverse, fetal kick counts and reasons to call

## 2018-08-22 ENCOUNTER — LAB (OUTPATIENT)
Dept: LAB | Facility: MEDICAL CENTER | Age: 28
End: 2018-08-22
Payer: COMMERCIAL

## 2018-08-22 ENCOUNTER — ROUTINE PRENATAL (OUTPATIENT)
Dept: PERINATAL CARE | Facility: CLINIC | Age: 28
End: 2018-08-22
Payer: COMMERCIAL

## 2018-08-22 VITALS
WEIGHT: 182.9 LBS | DIASTOLIC BLOOD PRESSURE: 90 MMHG | HEIGHT: 62 IN | HEART RATE: 86 BPM | SYSTOLIC BLOOD PRESSURE: 142 MMHG | BODY MASS INDEX: 33.66 KG/M2

## 2018-08-22 DIAGNOSIS — O10.913 CHRONIC HYPERTENSION IN OBSTETRIC CONTEXT IN THIRD TRIMESTER: Primary | ICD-10-CM

## 2018-08-22 DIAGNOSIS — Z28.39 RUBELLA NON-IMMUNE STATUS, ANTEPARTUM: ICD-10-CM

## 2018-08-22 DIAGNOSIS — O30.043 DICHORIONIC DIAMNIOTIC TWIN PREGNANCY IN THIRD TRIMESTER: ICD-10-CM

## 2018-08-22 DIAGNOSIS — O10.913 CHRONIC HYPERTENSION IN OBSTETRIC CONTEXT IN THIRD TRIMESTER: ICD-10-CM

## 2018-08-22 DIAGNOSIS — O09.899 RUBELLA NON-IMMUNE STATUS, ANTEPARTUM: ICD-10-CM

## 2018-08-22 LAB
ALBUMIN SERPL BCP-MCNC: 2.1 G/DL (ref 3.5–5)
ALP SERPL-CCNC: 270 U/L (ref 46–116)
ALT SERPL W P-5'-P-CCNC: 23 U/L (ref 12–78)
ANION GAP SERPL CALCULATED.3IONS-SCNC: 7 MMOL/L (ref 4–13)
AST SERPL W P-5'-P-CCNC: 26 U/L (ref 5–45)
BASOPHILS # BLD AUTO: 0.05 THOUSANDS/ΜL (ref 0–0.1)
BASOPHILS NFR BLD AUTO: 1 % (ref 0–1)
BILIRUB SERPL-MCNC: 0.63 MG/DL (ref 0.2–1)
BUN SERPL-MCNC: 6 MG/DL (ref 5–25)
CALCIUM SERPL-MCNC: 9 MG/DL (ref 8.3–10.1)
CHLORIDE SERPL-SCNC: 102 MMOL/L (ref 100–108)
CO2 SERPL-SCNC: 25 MMOL/L (ref 21–32)
CREAT SERPL-MCNC: 0.67 MG/DL (ref 0.6–1.3)
CREAT UR-MCNC: 60.5 MG/DL
EOSINOPHIL # BLD AUTO: 0.08 THOUSAND/ΜL (ref 0–0.61)
EOSINOPHIL NFR BLD AUTO: 1 % (ref 0–6)
ERYTHROCYTE [DISTWIDTH] IN BLOOD BY AUTOMATED COUNT: 13.7 % (ref 11.6–15.1)
GFR SERPL CREATININE-BSD FRML MDRD: 120 ML/MIN/1.73SQ M
GLUCOSE SERPL-MCNC: 75 MG/DL (ref 65–140)
HCT VFR BLD AUTO: 35.8 % (ref 34.8–46.1)
HGB BLD-MCNC: 11.8 G/DL (ref 11.5–15.4)
IMM GRANULOCYTES # BLD AUTO: 0.18 THOUSAND/UL (ref 0–0.2)
IMM GRANULOCYTES NFR BLD AUTO: 2 % (ref 0–2)
LYMPHOCYTES # BLD AUTO: 1.79 THOUSANDS/ΜL (ref 0.6–4.47)
LYMPHOCYTES NFR BLD AUTO: 19 % (ref 14–44)
MCH RBC QN AUTO: 30.3 PG (ref 26.8–34.3)
MCHC RBC AUTO-ENTMCNC: 33 G/DL (ref 31.4–37.4)
MCV RBC AUTO: 92 FL (ref 82–98)
MONOCYTES # BLD AUTO: 0.61 THOUSAND/ΜL (ref 0.17–1.22)
MONOCYTES NFR BLD AUTO: 7 % (ref 4–12)
NEUTROPHILS # BLD AUTO: 6.64 THOUSANDS/ΜL (ref 1.85–7.62)
NEUTS SEG NFR BLD AUTO: 70 % (ref 43–75)
NRBC BLD AUTO-RTO: 0 /100 WBCS
PLATELET # BLD AUTO: 154 THOUSANDS/UL (ref 149–390)
PMV BLD AUTO: 12.7 FL (ref 8.9–12.7)
POTASSIUM SERPL-SCNC: 3.3 MMOL/L (ref 3.5–5.3)
PROT SERPL-MCNC: 6.3 G/DL (ref 6.4–8.2)
PROT UR-MCNC: 13 MG/DL
PROT/CREAT UR: 0.21 MG/G{CREAT} (ref 0–0.1)
RBC # BLD AUTO: 3.9 MILLION/UL (ref 3.81–5.12)
SODIUM SERPL-SCNC: 134 MMOL/L (ref 136–145)
WBC # BLD AUTO: 9.35 THOUSAND/UL (ref 4.31–10.16)

## 2018-08-22 PROCEDURE — 84156 ASSAY OF PROTEIN URINE: CPT | Performed by: OBSTETRICS & GYNECOLOGY

## 2018-08-22 PROCEDURE — 80053 COMPREHEN METABOLIC PANEL: CPT

## 2018-08-22 PROCEDURE — 82570 ASSAY OF URINE CREATININE: CPT | Performed by: OBSTETRICS & GYNECOLOGY

## 2018-08-22 PROCEDURE — 85025 COMPLETE CBC W/AUTO DIFF WBC: CPT

## 2018-08-22 PROCEDURE — 36415 COLL VENOUS BLD VENIPUNCTURE: CPT

## 2018-08-22 NOTE — PATIENT INSTRUCTIONS
Preeclampsia   WHAT YOU NEED TO KNOW:   What is preeclampsia? Preeclampsia is a condition that can develop during week 20 or later of your pregnancy  Preeclampsia means you have high blood pressure and may have protein in your urine  Preeclampsia can cause mild to life-threatening health problems for you and your unborn baby  What are the signs and symptoms of preeclampsia? You may not have any symptoms  Severe preeclampsia may cause any of the following symptoms:  · Swollen face and hands    · A sudden weight gain of 5 pounds or more    · Headache    · Spotted or blurred vision     · Pain in your upper abdomen  What increases my risk for preeclampsia? · First pregnancy    · Pregnant with twins or multiples    · Personal or family history of preeclampsia or eclampsia    · Overweight    · Diabetes, high blood pressure, or kidney disease    · Age older than 40 years  How is preeclampsia diagnosed? · A blood pressure  of 140/90 mmHg or more for at least 2 readings may mean you have preeclampsia  Your blood pressure will need to be checked 1 to 2 times a week until your baby is born  · Blood tests  are done to check your liver and kidney function  You may need blood tests every week while you are pregnant  · Urine tests  are used to check for protein  You may need to give healthcare providers a urine sample at each visit  You may also need to collect your urine every time you urinate for 24 hours  How will my unborn baby be monitored? You may need to keep track of how often your baby moves or kicks over a certain amount of time  Ask your healthcare provider how to do kick counts and how often to do them  You may also need the following tests at each visit until your baby is born:  · A fetal biophysical profile  combines a nonstress test and an ultrasound of your unborn baby  The nonstress test measures changes in your baby's heartbeat when he moves   The ultrasound will show your baby's movement, growth, and how his breathing muscles are working  Healthcare providers can check the amount of fluid around your baby  The ultrasound will also show how well your baby's lungs are working  · An umbilical cord Doppler  checks blood flow through the umbilical cord  How is preeclampsia treated? · Medicines  may be given to lower your blood pressure, protect your organs, or prevent seizures  Low doses of aspirin after 12 weeks of pregnancy may be recommended if you are at high risk for preeclampsia  Aspirin may help prevent preeclampsia or problems that can happen from preeclampsia  Do not take aspirin unless directed by your healthcare provider  · Rest  as directed  Your healthcare provider may tell you to rest more often if you have mild symptoms of preeclampsia  Lie on your left side as often as you can  You may need complete bedrest if you have more severe symptoms  You may need to be in the hospital if your condition worsens  · Delivery  usually stops preeclampsia  Healthcare providers may deliver your baby right away if he is full-term (37 weeks or more)  He may need to be delivered early if you or the baby has life-threatening symptoms  What are the risks of preeclampsia? Your baby may not grow as he should and may need to be delivered early  Placental abruption can occur if the placenta pulls away from the uterus too soon  This condition is life-threatening for your baby  High blood pressure that is not controlled can lead to blood clots, kidney or liver failure, or stroke  Severe preeclampsia can cause seizures or coma  This condition is called eclampsia  Eclampsia is a life-threatening condition for you and your unborn baby  Call 911 for any of the following:   · You have a seizure  · You have severe abdominal pain with nausea and vomiting  When should I seek immediate care? · You develop a severe headache that does not go away      · You have blurred or spotted vision that does not go away     · You are bleeding from your vagina  · You have new or increased swelling in your face or hands  · You are urinating little or not at all  When should I contact my healthcare provider? · You are urinating less than usual      · You do not feel your baby's movements as often as usual     · You have questions or concerns about your condition or care  CARE AGREEMENT:   You have the right to help plan your care  Learn about your health condition and how it may be treated  Discuss treatment options with your caregivers to decide what care you want to receive  You always have the right to refuse treatment  The above information is an  only  It is not intended as medical advice for individual conditions or treatments  Talk to your doctor, nurse or pharmacist before following any medical regimen to see if it is safe and effective for you  © 2017 [facility] Information is for End User's use only and may not be sold, redistributed or otherwise used for commercial purposes  All illustrations and images included in CareNotes® are the copyrighted property of A [employer] A M [employer]  or Andrew Mondragon

## 2018-08-24 ENCOUNTER — ANESTHESIA EVENT (INPATIENT)
Dept: LABOR AND DELIVERY | Facility: HOSPITAL | Age: 28
DRG: 540 | End: 2018-08-24
Payer: COMMERCIAL

## 2018-08-24 ENCOUNTER — ROUTINE PRENATAL (OUTPATIENT)
Dept: PERINATAL CARE | Facility: MEDICAL CENTER | Age: 28
DRG: 540 | End: 2018-08-24
Payer: COMMERCIAL

## 2018-08-24 ENCOUNTER — HOSPITAL ENCOUNTER (INPATIENT)
Facility: HOSPITAL | Age: 28
LOS: 6 days | Discharge: HOME | DRG: 540 | End: 2018-08-30
Attending: OBSTETRICS & GYNECOLOGY | Admitting: OBSTETRICS & GYNECOLOGY
Payer: COMMERCIAL

## 2018-08-24 VITALS
WEIGHT: 184 LBS | BODY MASS INDEX: 33.86 KG/M2 | HEART RATE: 102 BPM | SYSTOLIC BLOOD PRESSURE: 155 MMHG | DIASTOLIC BLOOD PRESSURE: 102 MMHG | HEIGHT: 62 IN

## 2018-08-24 DIAGNOSIS — O14.93 PREECLAMPSIA, THIRD TRIMESTER: ICD-10-CM

## 2018-08-24 DIAGNOSIS — O14.10: ICD-10-CM

## 2018-08-24 DIAGNOSIS — O30.043 DICHORIONIC DIAMNIOTIC TWIN PREGNANCY IN THIRD TRIMESTER: Primary | ICD-10-CM

## 2018-08-24 DIAGNOSIS — Z3A.34 34 WEEKS GESTATION OF PREGNANCY: ICD-10-CM

## 2018-08-24 DIAGNOSIS — O10.913 CHRONIC HYPERTENSION IN OBSTETRIC CONTEXT IN THIRD TRIMESTER: Primary | ICD-10-CM

## 2018-08-24 LAB
ABO GROUP BLD: NORMAL
ALBUMIN SERPL BCP-MCNC: 2.1 G/DL (ref 3.5–5)
ALP SERPL-CCNC: 312 U/L (ref 46–116)
ALT SERPL W P-5'-P-CCNC: 35 U/L (ref 12–78)
ANION GAP SERPL CALCULATED.3IONS-SCNC: 10 MMOL/L (ref 4–13)
AST SERPL W P-5'-P-CCNC: 54 U/L (ref 5–45)
BACTERIA UR QL AUTO: ABNORMAL /HPF
BASE EXCESS BLDCOA CALC-SCNC: -2.5 MMOL/L (ref 3–11)
BASE EXCESS BLDCOA CALC-SCNC: -8.1 MMOL/L (ref 3–11)
BASE EXCESS BLDCOV CALC-SCNC: -7.8 MMOL/L (ref 1–9)
BASOPHILS # BLD AUTO: 0.04 THOUSANDS/ΜL (ref 0–0.1)
BASOPHILS NFR BLD AUTO: 0 % (ref 0–1)
BILIRUB SERPL-MCNC: 0.89 MG/DL (ref 0.2–1)
BILIRUB UR QL STRIP: ABNORMAL
BLD GP AB SCN SERPL QL: NEGATIVE
BUN SERPL-MCNC: 7 MG/DL (ref 5–25)
CALCIUM SERPL-MCNC: 9.5 MG/DL (ref 8.3–10.1)
CHLORIDE SERPL-SCNC: 103 MMOL/L (ref 100–108)
CLARITY UR: CLEAR
CO2 SERPL-SCNC: 21 MMOL/L (ref 21–32)
COLOR UR: ABNORMAL
CREAT SERPL-MCNC: 0.64 MG/DL (ref 0.6–1.3)
CREAT UR-MCNC: 113 MG/DL
EOSINOPHIL # BLD AUTO: 0.04 THOUSAND/ΜL (ref 0–0.61)
EOSINOPHIL NFR BLD AUTO: 0 % (ref 0–6)
ERYTHROCYTE [DISTWIDTH] IN BLOOD BY AUTOMATED COUNT: 13.4 % (ref 11.6–15.1)
GFR SERPL CREATININE-BSD FRML MDRD: 122 ML/MIN/1.73SQ M
GLUCOSE P FAST SERPL-MCNC: 69 MG/DL (ref 65–99)
GLUCOSE SERPL-MCNC: 69 MG/DL (ref 65–140)
GLUCOSE UR STRIP-MCNC: NEGATIVE MG/DL
HCO3 BLDCOA-SCNC: 21.7 MMOL/L (ref 17.3–27.3)
HCO3 BLDCOA-SCNC: 25.9 MMOL/L (ref 17.3–27.3)
HCO3 BLDCOV-SCNC: 19.7 MMOL/L (ref 12.2–28.6)
HCT VFR BLD AUTO: 37 % (ref 34.8–46.1)
HGB BLD-MCNC: 12.2 G/DL (ref 11.5–15.4)
HGB UR QL STRIP.AUTO: NEGATIVE
HYALINE CASTS #/AREA URNS LPF: ABNORMAL /LPF
IMM GRANULOCYTES # BLD AUTO: 0.17 THOUSAND/UL (ref 0–0.2)
IMM GRANULOCYTES NFR BLD AUTO: 2 % (ref 0–2)
KETONES UR STRIP-MCNC: NEGATIVE MG/DL
LEUKOCYTE ESTERASE UR QL STRIP: ABNORMAL
LYMPHOCYTES # BLD AUTO: 1.43 THOUSANDS/ΜL (ref 0.6–4.47)
LYMPHOCYTES NFR BLD AUTO: 14 % (ref 14–44)
MCH RBC QN AUTO: 30 PG (ref 26.8–34.3)
MCHC RBC AUTO-ENTMCNC: 33 G/DL (ref 31.4–37.4)
MCV RBC AUTO: 91 FL (ref 82–98)
MONOCYTES # BLD AUTO: 0.54 THOUSAND/ΜL (ref 0.17–1.22)
MONOCYTES NFR BLD AUTO: 5 % (ref 4–12)
NEUTROPHILS # BLD AUTO: 8.08 THOUSANDS/ΜL (ref 1.85–7.62)
NEUTS SEG NFR BLD AUTO: 79 % (ref 43–75)
NITRITE UR QL STRIP: NEGATIVE
NON-SQ EPI CELLS URNS QL MICRO: ABNORMAL /HPF
NRBC BLD AUTO-RTO: 0 /100 WBCS
O2 CT VFR BLDCOA CALC: 2.5 ML/DL
OXYHGB MFR BLDCOA: 17.1 %
OXYHGB MFR BLDCOA: 9.4 %
OXYHGB MFR BLDCOV: 43.6 %
PCO2 BLDCOA: 59.1 MM[HG] (ref 30–60)
PCO2 BLDCOA: 68.4 MM[HG] (ref 30–60)
PCO2 BLDCOV: 48.9 MM HG (ref 27–43)
PH BLDCOA: 7.12 [PH] (ref 7.23–7.43)
PH BLDCOA: 7.26 [PH] (ref 7.23–7.43)
PH BLDCOV: 7.22 [PH] (ref 7.19–7.49)
PH UR STRIP.AUTO: 6.5 [PH] (ref 4.5–8)
PLATELET # BLD AUTO: 159 THOUSANDS/UL (ref 149–390)
PMV BLD AUTO: 12.8 FL (ref 8.9–12.7)
PO2 BLDCOA: 13.7 MM HG (ref 5–25)
PO2 BLDCOA: <10 MM HG (ref 5–25)
PO2 BLDCOV: 22.3 MM HG (ref 15–45)
POTASSIUM SERPL-SCNC: 4 MMOL/L (ref 3.5–5.3)
PROT SERPL-MCNC: 6.7 G/DL (ref 6.4–8.2)
PROT UR STRIP-MCNC: NEGATIVE MG/DL
PROT UR-MCNC: 22 MG/DL
PROT/CREAT UR: 0.19 MG/G{CREAT} (ref 0–0.1)
RBC # BLD AUTO: 4.07 MILLION/UL (ref 3.81–5.12)
RBC #/AREA URNS AUTO: ABNORMAL /HPF
RH BLD: POSITIVE
SAO2 % BLDCOV: 6.4 ML/DL
SODIUM SERPL-SCNC: 134 MMOL/L (ref 136–145)
SP GR UR STRIP.AUTO: 1.01 (ref 1–1.03)
SPECIMEN EXPIRATION DATE: NORMAL
UROBILINOGEN UR QL STRIP.AUTO: 1 E.U./DL
WBC # BLD AUTO: 10.3 THOUSAND/UL (ref 4.31–10.16)
WBC #/AREA URNS AUTO: ABNORMAL /HPF

## 2018-08-24 PROCEDURE — 59025 FETAL NON-STRESS TEST: CPT | Performed by: OBSTETRICS & GYNECOLOGY

## 2018-08-24 PROCEDURE — 86850 RBC ANTIBODY SCREEN: CPT | Performed by: OBSTETRICS & GYNECOLOGY

## 2018-08-24 PROCEDURE — 59515 CESAREAN DELIVERY: CPT | Performed by: OBSTETRICS & GYNECOLOGY

## 2018-08-24 PROCEDURE — 86901 BLOOD TYPING SEROLOGIC RH(D): CPT | Performed by: OBSTETRICS & GYNECOLOGY

## 2018-08-24 PROCEDURE — 88307 TISSUE EXAM BY PATHOLOGIST: CPT | Performed by: PATHOLOGY

## 2018-08-24 PROCEDURE — 84156 ASSAY OF PROTEIN URINE: CPT | Performed by: OBSTETRICS & GYNECOLOGY

## 2018-08-24 PROCEDURE — 82805 BLOOD GASES W/O2 SATURATION: CPT | Performed by: OBSTETRICS & GYNECOLOGY

## 2018-08-24 PROCEDURE — 85025 COMPLETE CBC W/AUTO DIFF WBC: CPT | Performed by: OBSTETRICS & GYNECOLOGY

## 2018-08-24 PROCEDURE — 99212 OFFICE O/P EST SF 10 MIN: CPT | Performed by: OBSTETRICS & GYNECOLOGY

## 2018-08-24 PROCEDURE — 86592 SYPHILIS TEST NON-TREP QUAL: CPT | Performed by: OBSTETRICS & GYNECOLOGY

## 2018-08-24 PROCEDURE — 81001 URINALYSIS AUTO W/SCOPE: CPT | Performed by: OBSTETRICS & GYNECOLOGY

## 2018-08-24 PROCEDURE — 82570 ASSAY OF URINE CREATININE: CPT | Performed by: OBSTETRICS & GYNECOLOGY

## 2018-08-24 PROCEDURE — 86900 BLOOD TYPING SEROLOGIC ABO: CPT | Performed by: OBSTETRICS & GYNECOLOGY

## 2018-08-24 PROCEDURE — 80053 COMPREHEN METABOLIC PANEL: CPT | Performed by: OBSTETRICS & GYNECOLOGY

## 2018-08-24 PROCEDURE — 99213 OFFICE O/P EST LOW 20 MIN: CPT

## 2018-08-24 PROCEDURE — 87086 URINE CULTURE/COLONY COUNT: CPT | Performed by: OBSTETRICS & GYNECOLOGY

## 2018-08-24 RX ORDER — FENTANYL CITRATE/PF 50 MCG/ML
50 SYRINGE (ML) INJECTION
Status: DISCONTINUED | OUTPATIENT
Start: 2018-08-24 | End: 2018-08-25

## 2018-08-24 RX ORDER — IBUPROFEN 600 MG/1
600 TABLET ORAL EVERY 6 HOURS PRN
Status: DISCONTINUED | OUTPATIENT
Start: 2018-08-24 | End: 2018-08-30 | Stop reason: HOSPADM

## 2018-08-24 RX ORDER — DIPHENHYDRAMINE HYDROCHLORIDE 50 MG/ML
25 INJECTION INTRAMUSCULAR; INTRAVENOUS EVERY 6 HOURS PRN
Status: DISPENSED | OUTPATIENT
Start: 2018-08-24 | End: 2018-08-25

## 2018-08-24 RX ORDER — DOCUSATE SODIUM 100 MG/1
100 CAPSULE, LIQUID FILLED ORAL 2 TIMES DAILY
Status: DISCONTINUED | OUTPATIENT
Start: 2018-08-24 | End: 2018-08-30 | Stop reason: HOSPADM

## 2018-08-24 RX ORDER — DIAPER,BRIEF,INFANT-TODD,DISP
1 EACH MISCELLANEOUS 4 TIMES DAILY PRN
Status: DISCONTINUED | OUTPATIENT
Start: 2018-08-24 | End: 2018-08-30 | Stop reason: HOSPADM

## 2018-08-24 RX ORDER — ALBUTEROL SULFATE 90 UG/1
2 AEROSOL, METERED RESPIRATORY (INHALATION) EVERY 4 HOURS PRN
Status: DISCONTINUED | OUTPATIENT
Start: 2018-08-24 | End: 2018-08-30 | Stop reason: HOSPADM

## 2018-08-24 RX ORDER — OXYTOCIN/RINGER'S LACTATE 30/500 ML
PLASTIC BAG, INJECTION (ML) INTRAVENOUS AS NEEDED
Status: DISCONTINUED | OUTPATIENT
Start: 2018-08-24 | End: 2018-08-24 | Stop reason: SURG

## 2018-08-24 RX ORDER — KETOROLAC TROMETHAMINE 30 MG/ML
INJECTION, SOLUTION INTRAMUSCULAR; INTRAVENOUS AS NEEDED
Status: DISCONTINUED | OUTPATIENT
Start: 2018-08-24 | End: 2018-08-24 | Stop reason: SURG

## 2018-08-24 RX ORDER — NALBUPHINE HCL 10 MG/ML
5 AMPUL (ML) INJECTION
Status: DISPENSED | OUTPATIENT
Start: 2018-08-24 | End: 2018-08-25

## 2018-08-24 RX ORDER — ONDANSETRON 2 MG/ML
4 INJECTION INTRAMUSCULAR; INTRAVENOUS EVERY 4 HOURS PRN
Status: ACTIVE | OUTPATIENT
Start: 2018-08-24 | End: 2018-08-25

## 2018-08-24 RX ORDER — ACETAMINOPHEN 325 MG/1
650 TABLET ORAL EVERY 6 HOURS PRN
Status: DISCONTINUED | OUTPATIENT
Start: 2018-08-24 | End: 2018-08-25

## 2018-08-24 RX ORDER — KETOROLAC TROMETHAMINE 30 MG/ML
30 INJECTION, SOLUTION INTRAMUSCULAR; INTRAVENOUS EVERY 6 HOURS SCHEDULED
Status: DISCONTINUED | OUTPATIENT
Start: 2018-08-24 | End: 2018-08-27

## 2018-08-24 RX ORDER — MIDAZOLAM HYDROCHLORIDE 1 MG/ML
INJECTION INTRAMUSCULAR; INTRAVENOUS AS NEEDED
Status: DISCONTINUED | OUTPATIENT
Start: 2018-08-24 | End: 2018-08-24 | Stop reason: SURG

## 2018-08-24 RX ORDER — SODIUM CHLORIDE, SODIUM LACTATE, POTASSIUM CHLORIDE, CALCIUM CHLORIDE 600; 310; 30; 20 MG/100ML; MG/100ML; MG/100ML; MG/100ML
25 INJECTION, SOLUTION INTRAVENOUS CONTINUOUS
Status: DISCONTINUED | OUTPATIENT
Start: 2018-08-24 | End: 2018-08-25

## 2018-08-24 RX ORDER — CALCIUM CARBONATE 200(500)MG
1000 TABLET,CHEWABLE ORAL DAILY PRN
Status: DISCONTINUED | OUTPATIENT
Start: 2018-08-24 | End: 2018-08-30 | Stop reason: HOSPADM

## 2018-08-24 RX ORDER — METOCLOPRAMIDE HYDROCHLORIDE 5 MG/ML
5 INJECTION INTRAMUSCULAR; INTRAVENOUS EVERY 6 HOURS PRN
Status: ACTIVE | OUTPATIENT
Start: 2018-08-24 | End: 2018-08-25

## 2018-08-24 RX ORDER — SIMETHICONE 80 MG
80 TABLET,CHEWABLE ORAL EVERY 6 HOURS PRN
Status: DISCONTINUED | OUTPATIENT
Start: 2018-08-24 | End: 2018-08-30 | Stop reason: HOSPADM

## 2018-08-24 RX ORDER — OXYTOCIN/RINGER'S LACTATE 30/500 ML
PLASTIC BAG, INJECTION (ML) INTRAVENOUS
Status: COMPLETED
Start: 2018-08-24 | End: 2018-08-24

## 2018-08-24 RX ORDER — METOCLOPRAMIDE HYDROCHLORIDE 5 MG/ML
10 INJECTION INTRAMUSCULAR; INTRAVENOUS ONCE AS NEEDED
Status: DISCONTINUED | OUTPATIENT
Start: 2018-08-24 | End: 2018-08-25

## 2018-08-24 RX ORDER — CALCIUM GLUCONATE 94 MG/ML
1 INJECTION, SOLUTION INTRAVENOUS ONCE AS NEEDED
Status: DISCONTINUED | OUTPATIENT
Start: 2018-08-24 | End: 2018-08-30 | Stop reason: HOSPADM

## 2018-08-24 RX ORDER — LABETALOL HYDROCHLORIDE 5 MG/ML
20 INJECTION, SOLUTION INTRAVENOUS ONCE
Status: COMPLETED | OUTPATIENT
Start: 2018-08-24 | End: 2018-08-24

## 2018-08-24 RX ORDER — BETAMETHASONE SODIUM PHOSPHATE AND BETAMETHASONE ACETATE 3; 3 MG/ML; MG/ML
12 INJECTION, SUSPENSION INTRA-ARTICULAR; INTRALESIONAL; INTRAMUSCULAR; SOFT TISSUE ONCE
Status: DISCONTINUED | OUTPATIENT
Start: 2018-08-24 | End: 2018-08-24 | Stop reason: HOSPADM

## 2018-08-24 RX ORDER — DEXAMETHASONE SODIUM PHOSPHATE 4 MG/ML
INJECTION, SOLUTION INTRA-ARTICULAR; INTRALESIONAL; INTRAMUSCULAR; INTRAVENOUS; SOFT TISSUE AS NEEDED
Status: DISCONTINUED | OUTPATIENT
Start: 2018-08-24 | End: 2018-08-24 | Stop reason: SURG

## 2018-08-24 RX ORDER — DIPHENHYDRAMINE HCL 25 MG
25 TABLET ORAL EVERY 6 HOURS PRN
Status: DISCONTINUED | OUTPATIENT
Start: 2018-08-24 | End: 2018-08-30 | Stop reason: HOSPADM

## 2018-08-24 RX ORDER — MAGNESIUM SULFATE HEPTAHYDRATE 40 MG/ML
2 INJECTION, SOLUTION INTRAVENOUS ONCE
Status: COMPLETED | OUTPATIENT
Start: 2018-08-24 | End: 2018-08-24

## 2018-08-24 RX ORDER — MAGNESIUM SULFATE HEPTAHYDRATE 40 MG/ML
2 INJECTION, SOLUTION INTRAVENOUS CONTINUOUS
Status: DISCONTINUED | OUTPATIENT
Start: 2018-08-24 | End: 2018-08-25

## 2018-08-24 RX ORDER — FLUTICASONE PROPIONATE 50 MCG
2 SPRAY, SUSPENSION (ML) NASAL DAILY
Status: DISCONTINUED | OUTPATIENT
Start: 2018-08-25 | End: 2018-08-30 | Stop reason: HOSPADM

## 2018-08-24 RX ORDER — ONDANSETRON 2 MG/ML
INJECTION INTRAMUSCULAR; INTRAVENOUS AS NEEDED
Status: DISCONTINUED | OUTPATIENT
Start: 2018-08-24 | End: 2018-08-24 | Stop reason: SURG

## 2018-08-24 RX ORDER — MORPHINE SULFATE 0.5 MG/ML
INJECTION, SOLUTION EPIDURAL; INTRATHECAL; INTRAVENOUS AS NEEDED
Status: DISCONTINUED | OUTPATIENT
Start: 2018-08-24 | End: 2018-08-24 | Stop reason: SURG

## 2018-08-24 RX ORDER — ACETAMINOPHEN 325 MG/1
650 TABLET ORAL EVERY 6 HOURS
Status: DISCONTINUED | OUTPATIENT
Start: 2018-08-24 | End: 2018-08-27

## 2018-08-24 RX ORDER — MAGNESIUM SULFATE HEPTAHYDRATE 40 MG/ML
4 INJECTION, SOLUTION INTRAVENOUS ONCE
Status: COMPLETED | OUTPATIENT
Start: 2018-08-24 | End: 2018-08-24

## 2018-08-24 RX ORDER — OXYCODONE HYDROCHLORIDE AND ACETAMINOPHEN 5; 325 MG/1; MG/1
1 TABLET ORAL EVERY 6 HOURS PRN
Status: DISCONTINUED | OUTPATIENT
Start: 2018-08-24 | End: 2018-08-27

## 2018-08-24 RX ORDER — DIPHENHYDRAMINE HYDROCHLORIDE 50 MG/ML
25 INJECTION INTRAMUSCULAR; INTRAVENOUS ONCE
Status: COMPLETED | OUTPATIENT
Start: 2018-08-24 | End: 2018-08-24

## 2018-08-24 RX ORDER — DEXAMETHASONE SODIUM PHOSPHATE 4 MG/ML
8 INJECTION, SOLUTION INTRA-ARTICULAR; INTRALESIONAL; INTRAMUSCULAR; INTRAVENOUS; SOFT TISSUE ONCE AS NEEDED
Status: ACTIVE | OUTPATIENT
Start: 2018-08-24 | End: 2018-08-25

## 2018-08-24 RX ORDER — OXYTOCIN/RINGER'S LACTATE 30/500 ML
62.5 PLASTIC BAG, INJECTION (ML) INTRAVENOUS CONTINUOUS
Status: ACTIVE | OUTPATIENT
Start: 2018-08-24 | End: 2018-08-25

## 2018-08-24 RX ORDER — BUPIVACAINE HYDROCHLORIDE 7.5 MG/ML
INJECTION, SOLUTION INTRASPINAL AS NEEDED
Status: DISCONTINUED | OUTPATIENT
Start: 2018-08-24 | End: 2018-08-24 | Stop reason: SURG

## 2018-08-24 RX ADMIN — MAGNESIUM SULFATE HEPTAHYDRATE 2 G: 40 INJECTION, SOLUTION INTRAVENOUS at 14:00

## 2018-08-24 RX ADMIN — KETOROLAC TROMETHAMINE 30 MG: 30 INJECTION, SOLUTION INTRAMUSCULAR at 16:35

## 2018-08-24 RX ADMIN — Medication 62.5 MILLI-UNITS/MIN: at 17:25

## 2018-08-24 RX ADMIN — CEFAZOLIN SODIUM 2000 MG: 2 SOLUTION INTRAVENOUS at 15:56

## 2018-08-24 RX ADMIN — NALBUPHINE HYDROCHLORIDE 5 MG: 10 INJECTION, SOLUTION INTRAMUSCULAR; INTRAVENOUS; SUBCUTANEOUS at 17:42

## 2018-08-24 RX ADMIN — SODIUM CHLORIDE, SODIUM LACTATE, POTASSIUM CHLORIDE, AND CALCIUM CHLORIDE 25 ML/HR: .6; .31; .03; .02 INJECTION, SOLUTION INTRAVENOUS at 14:21

## 2018-08-24 RX ADMIN — Medication 30 MG: at 16:43

## 2018-08-24 RX ADMIN — MIDAZOLAM 2 MG: 1 INJECTION INTRAMUSCULAR; INTRAVENOUS at 16:43

## 2018-08-24 RX ADMIN — DIPHENHYDRAMINE HYDROCHLORIDE 25 MG: 50 INJECTION, SOLUTION INTRAMUSCULAR; INTRAVENOUS at 20:20

## 2018-08-24 RX ADMIN — MORPHINE SULFATE 0.25 MG: 0.5 INJECTION, SOLUTION EPIDURAL; INTRATHECAL; INTRAVENOUS at 16:05

## 2018-08-24 RX ADMIN — NALBUPHINE HYDROCHLORIDE 5 MG: 10 INJECTION, SOLUTION INTRAMUSCULAR; INTRAVENOUS; SUBCUTANEOUS at 21:43

## 2018-08-24 RX ADMIN — Medication 30 UNITS: at 16:24

## 2018-08-24 RX ADMIN — SODIUM CHLORIDE, SODIUM LACTATE, POTASSIUM CHLORIDE, AND CALCIUM CHLORIDE: .6; .31; .03; .02 INJECTION, SOLUTION INTRAVENOUS at 16:14

## 2018-08-24 RX ADMIN — MAGNESIUM SULFATE HEPTAHYDRATE 2 G/HR: 40 INJECTION, SOLUTION INTRAVENOUS at 14:19

## 2018-08-24 RX ADMIN — SODIUM CHLORIDE, SODIUM LACTATE, POTASSIUM CHLORIDE, AND CALCIUM CHLORIDE 500 ML: .6; .31; .03; .02 INJECTION, SOLUTION INTRAVENOUS at 13:21

## 2018-08-24 RX ADMIN — ONDANSETRON 4 MG: 2 INJECTION INTRAMUSCULAR; INTRAVENOUS at 16:07

## 2018-08-24 RX ADMIN — MORPHINE SULFATE 2 MG: 0.5 INJECTION, SOLUTION EPIDURAL; INTRATHECAL; INTRAVENOUS at 16:35

## 2018-08-24 RX ADMIN — PHENYLEPHRINE HYDROCHLORIDE 50 MCG/MIN: 10 INJECTION INTRAVENOUS at 16:06

## 2018-08-24 RX ADMIN — BETAMETHASONE SODIUM PHOSPHATE AND BETAMETHASONE ACETATE 12 MG: 3; 3 INJECTION, SUSPENSION INTRA-ARTICULAR; INTRALESIONAL; INTRAMUSCULAR at 12:00

## 2018-08-24 RX ADMIN — MAGNESIUM SULFATE HEPTAHYDRATE 4 G: 40 INJECTION, SOLUTION INTRAVENOUS at 13:23

## 2018-08-24 RX ADMIN — BUPIVACAINE HYDROCHLORIDE IN DEXTROSE 1.6 ML: 7.5 INJECTION, SOLUTION SUBARACHNOID at 16:05

## 2018-08-24 RX ADMIN — DIPHENHYDRAMINE HYDROCHLORIDE 25 MG: 50 INJECTION, SOLUTION INTRAMUSCULAR; INTRAVENOUS at 18:15

## 2018-08-24 RX ADMIN — DEXAMETHASONE SODIUM PHOSPHATE 4 MG: 4 INJECTION, SOLUTION INTRAMUSCULAR; INTRAVENOUS at 16:07

## 2018-08-24 RX ADMIN — LABETALOL 20 MG/4 ML (5 MG/ML) INTRAVENOUS SYRINGE 20 MG: at 13:20

## 2018-08-24 NOTE — ANESTHESIA PREPROCEDURE EVALUATION
Review of Systems/Medical History  Patient summary reviewed  Chart reviewed  No history of anesthetic complications     Cardiovascular  Hypertension pre-existing hypertension complicating pregnancy,    Pulmonary  Negative pulmonary ROS        GI/Hepatic  Negative GI/hepatic ROS          Negative  ROS        Endo/Other  Negative endo/other ROS      GYN  Currently pregnant ,          Hematology  Negative hematology ROS      Musculoskeletal  Negative musculoskeletal ROS        Neurology  Negative neurology ROS      Psychology   Depression ,              Physical Exam    Airway    Mallampati score: II  TM Distance: >3 FB       Dental   Comment: Multiple chipped and loose teeth,     Cardiovascular  Rhythm: regular, Rate: normal, Cardiovascular exam normal    Pulmonary  Pulmonary exam normal Breath sounds clear to auscultation,     Other Findings        Anesthesia Plan  ASA Score- 3 Emergent    Anesthesia Type- spinal with ASA Monitors  Additional Monitors:   Airway Plan:     Comment: Pre-eclampsia with severe features present  MgSulfate infusion held for spinal      Plan Factors-    Induction-     Postoperative Plan- Plan for postoperative opioid use  Informed Consent- Anesthetic plan and risks discussed with patient

## 2018-08-24 NOTE — H&P
History & Physical - OB/GYN   Margaret Mata 28 y o  female MRN: [id number]  Unit/Bed#: LD Triage 3-01 Encounter: [id number]    28 y o   at 34w6d by LMP  She is a patient of Dr Navya Zhao    Chief complaint:  Transfer from [facility] for non-reassuring FHT    HPI:  Ms Nakul Chase is a 28 y o   at 34w6d [facility] BPs and a non-reassuring NST  Patient has known CHTN for which she has not required medications  She endorses a headache x3d and "feels foggy " Frontal in location, has not tried anything for it  She denies visual changes or abdominal pain  Feels rare, mild ctxns  Denies lof/vb  +FM      Pregnancy Complications:  Patient Active Problem List   Diagnosis    Maternal obesity affecting pregnancy, antepartum    Rubella non-immune status, antepartum   Learta January Dichorionic diamniotic twin pregnancy in third trimester    Chronic hypertension in obstetric context in third trimester    34 weeks gestation of pregnancy       PMH:  Past Medical History:   Diagnosis Date    Depression     Hypertension     Varicella        PSH:  Past Surgical History:   Procedure Laterality Date    MYRINGOTOMY W/ TUBES         Social Hx:  Denies tobacco, drugs, EtOH    OB Hx:  Obstetric History       T0      L0     SAB0   TAB0   Ectopic0   Multiple0   Live Births0       # Outcome Date GA Lbr Eliu/2nd Weight Sex Delivery Anes PTL Lv   1 Current                   Meds:  No current facility-administered medications on file prior to encounter  Current Outpatient Prescriptions on File Prior to Encounter   Medication Sig Dispense Refill    albuterol (PROVENTIL HFA,VENTOLIN HFA) 90 mcg/act inhaler Two puffs every four hours as needed for wheezing      Blood Pressure Monitoring (ADULT BLOOD PRESSURE CUFF LG) KIT by Does not apply route daily for 90 days Please dispense an automated BP kit   1 each 0    cetirizine (ZyrTEC) 10 mg tablet Take 10 mg by mouth      fluticasone (FLONASE) 50 mcg/act nasal spray 2 sprays into each nostril      Prenatal Vit-Fe Fumarate-FA (PNV PRENATAL PLUS MULTIVITAMIN) 27-1 MG TABS Take by mouth         Allergies: Allergies   Allergen Reactions    Poison Ivy Extract Rash       Labs:  Blood type: AB+  Antibody: negative  Group B strep: unknown  HIV: negative  Hepatitis B: negative  RPR: NR  Rubella: equivical  Varicella Immune  1 hour Glucose: 104    Physical Exam:  /117, 172/109    Physical Exam   Constitutional: She is oriented to person, place, and time  She appears well-developed and well-nourished  HENT:   Head: Normocephalic and atraumatic  Eyes: No scleral icterus  Cardiovascular: Normal rate, regular rhythm and normal heart sounds  Exam reveals no gallop and no friction rub  No murmur heard  Pulmonary/Chest: Effort normal and breath sounds normal  No respiratory distress  She has no wheezes  She has no rales  Abdominal: She exhibits distension (gravid)  There is no tenderness  There is no rebound and no guarding  Neurological: She is alert and oriented to person, place, and time  Skin: Skin is warm and dry  No rash noted  No erythema  No pallor  Psychiatric: She has a normal mood and affect  Her behavior is normal        FHT-A:  140 / Moderate 6 - 25 bpm / +accels, reactive  FHT-B:  165 / Minimal 1 - 5 bpm / early decel, no accel, non-reactive  [place]: irregular    Membranes: intact    Assessment:   28 y o   at 34w7d with dichorionic, diamniotic twins and known CHTN with exacerbation of BPs concerning for evolving preeclampsia with severe features  Non-reassuring status of twin B noted at present  Plan:   1  CHTN with exacerbation of BPs  - Admit to L&D  - CBC, CMP, urine protein:creatinine  - RPR, type and screen  - Magnesium sulfate infusion for seizure ppx  - Ancef 2g IV  - NPO  - Abdominal prep, SCDs, castillo  2   Non-reassuring status of twin B  - cEFM  - IVF bolus  - Proceed with  delivery      Discussed with   Tony Rich

## 2018-08-24 NOTE — PROGRESS NOTES
Patient presented today for nonstress testing  Blood pressures are as follows:  171/114, 158/103, 155/102  She endorses a severe headache for days  Her mom indicates that she has been acting foggy lately  The patient has intact speech, intact extraocular muscles, has no clonus, but has hyperreflexia in her patellar reflexes  She looks on well  I am recommending that she go to Labor and delivery for evaluation and probable delivery in the setting of what I believe to be severe features of preeclampsia  I am recommending that she go by EMS in the setting of severe headache, neurologic symptoms per the patient's mother, as well as length of drive between here in [facility]  The mother agrees  Betamethasone 12mg, 1 dose was administered IM while she was waiting  In regards to the fetal heart rate tracing, for fetus A, there is moderate variability without accelerations without decelerations  The patient is elizabeth frequently, approximately every 1-2 minutes  The fetal heart rate tracing for fetus B is indeterminate at this time; there are no decelerations, there is elevated baseline, however, they are almost appears to be a pseudosinusoidal pattern  Again it is indeterminate and not consistent with a category 3 tracing  This, in consideration of her symptoms, is an additional reason to send by EMS      Joel Holley MD  Maternal-Fetal Medicine

## 2018-08-24 NOTE — ANESTHESIA PROCEDURE NOTES
Spinal Block    Patient location during procedure: OR  Start time: 8/24/2018 4:01 PM  End time: 8/24/2018 4:05 PM  Reason for block: at surgeon's request and primary anesthetic  Staffing  Anesthesiologist: Caroline Rivera  Performed: anesthesiologist   Preanesthetic Checklist  Completed: patient identified, site marked, surgical consent, pre-op evaluation, timeout performed, IV checked, risks and benefits discussed and monitors and equipment checked  Spinal Block  Patient position: sitting  Prep: Betadine and site prepped and draped  Patient monitoring: frequent blood pressure checks, continuous pulse ox and heart rate  Approach: midline  Location: L3-4  Injection technique: single-shot  Needle  Needle type: pencil-tip   Needle gauge: 25 G  Needle length: 10 cm  Assessment  Sensory level: T4  Events: cerebrospinal fluid  Injection Assessment:  negative aspiration for heme, no paresthesia on injection and positive aspiration for clear CSF    Post-procedure:  adhesive bandage applied, pressure dressing applied, secured with tape, site cleaned and sterile dressing applied

## 2018-08-24 NOTE — DISCHARGE INSTRUCTIONS
Section   WHAT YOU SHOULD KNOW:   A  delivery, or , is abdominal surgery to deliver your baby  There are many reasons you may need a   · A  may be scheduled before labor if you had a  with your last baby  It may be scheduled if your baby is not positioned normally, or you are pregnant with more than 1 baby  · Your caregiver may perform an emergency  during labor to prevent life-threatening complications for you or your baby  A  may be done if your cervix does not dilate after several hours of active labor  · Other reasons for a  include maternal infections and problems with the placenta  AFTER YOU LEAVE:   Medicines:   · Prescription pain medicine  may be given  Ask how to take this medicine safely  · Acetaminophen  decreases pain and fever  It is available without a doctor's order  Ask how much to take and how often to take it  Follow directions  Acetaminophen can cause liver damage if not taken correctly  · NSAIDs  help decrease swelling and pain or fever  This medicine is available with or without a doctor's order  NSAIDs can cause stomach bleeding or kidney problems in certain people  If you take blood thinner medicine, always ask your obstetrician if NSAIDs are safe for you  Always read the medicine label and follow directions  · Take your medicine as directed  Contact your obstetrician (OB) if you think your medicine is not helping or if you have side effects  Tell him if you are allergic to any medicine  Keep a list of the medicines, vitamins, and herbs you take  Include the amounts, and when and why you take them  Bring the list or the pill bottles to follow-up visits  Carry your medicine list with you in case of an emergency  Follow up with your OB as directed: You may need to return to have your stitches or staples removed   Write down your questions so you remember to ask them during your visits  Wound care:  Carefully wash your wound with soap and water every day  Keep your wound clean and dry  Wear loose, comfortable clothes that do not rub against your wound  Ask your OB about bathing and showering  Drink plenty of liquids: You can lower your risk for a blood clot if you drink plenty of liquids  Ask how much liquid to drink each day and which liquids are best for you  Limit activity until you have fully recovered from surgery:   · Ask when it is safe for you to drive, walk up stairs, lift heavy objects, and have sex  · Ask when it is okay to exercise, and what types of exercise to do  Start slowly and do more as you get stronger  Contact your OB if:   · You have heavy vaginal bleeding that fills 1 or more sanitary pads in 1 hour  · You have a fever  · Your incision is swollen, red, or draining pus  · You have questions or concerns about yourself or your baby  Seek care immediately or call 911 if:   · Blood soaks through your bandage  · Your stitches come apart  · You feel lightheaded, short of breath, and have chest pain  · You cough up blood  · Your arm or leg feels warm, tender, and painful  It may look swollen and red  © 2014 [address] is for End User's use only and may not be sold, redistributed or otherwise used for commercial purposes  All illustrations and images included in CareNotes® are the copyrighted property of [employer] M [employer]  or Andrew [employer]ragon  The above information is an  only  It is not intended as medical advice for individual conditions or treatments  Talk to your doctor, nurse or pharmacist before following any medical regimen to see if it is safe and effective for you

## 2018-08-24 NOTE — DISCHARGE SUMMARY
Discharge Summary - Guillermo Mata 28 y o  female MRN: [id number]    Unit/Bed#: LD Triage 3 Encounter: [id number]    Admission Date: 2018     Discharge Date: 2018    Admitting Diagnosis:   1  Pregnancy at 34w6d  2  Mere Alosa Twins  3  CHTN with SI pre-eclampsia with severe features  4  Obesity  5  Rubella non-immune status  6  Bipolar disorder  7  Non-reassuring fetal status of Twin B    Discharge Diagnosis: same, delivered    Procedures: primary  section, low transverse incision    Attending: STEVE Oneal  [place] [facility] Course:     Jamie Robb is a 28 y o   at 34w6d wks who was initially admitted with cHTN with superimposed pre-eclampsia with severe features  Pt was noted to have severe range blood pressures at [facility] and persistent headache  Upon arrival to L&D, she was noted to have repetitive severe range blood pressures requiring acute treatment with 20mg IV Labetalol  Magnesium was started immediately for seizure PPX  Twin B was also noted to have a non-reassuring fetal status, initially, and decision was made to proceed to OR for primary  section  Pre-eclampsia labs were drawn and noted to be mostly WNL, with P/C ratio 0  19  AST was elevated at 54U/L  Twin A: She delivered a viable male  on 18 at 1623  Weight 5lbs 6 1oz via primary  section, low transverse incision  Apgars were 8 (1 min) and 9 (5 min)   was transferred to NICU  Twin B: She delivered a viable female  on 18 at 56  Weight 4lbs 5 1oz via primary  section, low transverse incision  Apgars were 3 (1 min) and 9 (5 min)   was transferred to NICU  Patient tolerated the procedure well and was transferred to recovery in stable condition  Her post-operative course was complicated by severe range blood pressures throughout her course treated appropriately with oral antihypertensives  Preoperative hemoglobin was 12 2, postoperative was 11 4-->12 8   Her postoperative pain was well controlled with oral analgesics  On day of discharge, she was ambulating and able to reasonably perform all ADLs  She was voiding and had appropriate bowel function  Pain was well controlled  She was discharged home on post-operative day #6 without complications  Patient was instructed to follow up with her OB as an outpatient and was given appropriate warnings to call provider if she develops signs of infection or uncontrolled pain  Complications: none apparent    Condition at discharge: good      Discharge Medications:   Prenatal vitamin daily for 6 months or the duration of nursing whichever is longer  Motrin 600 mg orally every 6 hours as needed for pain  Tylenol (over the counter) per bottle directions as needed for pain  Hydrocortisone cream 1% (over the counter) applied 1-2x daily to hemorrhoids as needed  Witch hazel pads for hemorrhoidal discomfort as needed    Discharge instructions: See after visit summary for complete information  Do not place anything (no partner, tampons or douche) in your vagina for 6 weeks  You may walk for exercise for the first 6 weeks then gradually return to your usual activities     Please do not drive for 1 week if you have no stitches and for 2 weeks if you have stitches or underwent a  delivery     You may take baths or shower per your preference     Please look at your bust (breasts) in the mirror daily and call for redness or tenderness or increased warmth     If you have had a  please look at your incision daily as well and call us for increasing redness or steady drainage from the incision     Please call us for temperature > 100 4*F or 38* C, worsening pain or a foul discharge      Disposition: Home    Planned Readmission: No

## 2018-08-24 NOTE — OP NOTE
OPERATIVE REPORT  PATIENT NAME: Emilio Mata    :  1990  MRN: [id number]  Pt Location: [facility] L&D OR ROOM 02    SURGERY DATE: 2018    Surgeon(s) and Role:     * Lesly Bhatt MD - Primary     * Kasandra Dorantes MD - Jameel Canales MD - Assisting    Preop Diagnosis:  1  Pregnancy at 34w6d  2  Carmelo Peeling Twins  3  CHTN with SI pre-eclampsia with severe features  4  Obesity  5  Rubella non-immune status  6  Bipolar disorder  7  Non-reassuring fetal status of Twin B    Post-Op Diagnosis Codes:  Same, delivered    Procedure(s) (LRB):   SECTION () (N/A)    Specimen(s):  ID Type Source Tests Collected by Time Destination   1 : clamp on twin 1 Tissue (Placenta on Hold) OB Only Placenta TISSUE EXAM OB (PLACENTA) ONLY Lesly Bhatt MD 2018 1641    A : twin 1 Cord Blood Cord BLOOD GAS, VENOUS, CORD, BLOOD GAS, ARTERIAL, CORD Lesly Bhatt MD 2018 1625    B : twin 2 Cord Blood Cord BLOOD GAS, VENOUS, CORD, BLOOD GAS, ARTERIAL, CORD Lesly Bhatt MD 2018 1626        Estimated Blood Loss:   800 mL    Drains:  Urethral Catheter Latex 16 Fr  (Active)   Site Assessment Skin intact; Clean 2018  4:08 PM   Collection Container Standard drainage bag 2018  4:08 PM   Securement Method Securing device (Describe) 2018  4:08 PM   Number of days: 0       Anesthesia Type:   Spinal    Operative Indications:  CHTN with superimposed Pre-eclampsia with severe features  Non-reassuring fetal status of Twin B  Harford Peeling twins    Operative Findings:  1  Twin A: Delivery of viable male on 18 at 1623, weight 5lbs 6oz;  Apgar scores of 8 at one minute and 9 at five minutes  Twin B: Delivery of viable female on 18 at 1624, weight 4lbs 5oz;  Apgar scores of 3 at one minute and 9 at five minutes  2  Normal appearing placenta with 3 vessel cord for Twin A; Abnormal-appearing placenta for Twin B with marginal cord insertion  3   Clear amniotic fluid  4  Grossly normal uterus, tubes, and ovaries    Specimens:  1  Arterial and venous cord gases  Twin A: Arterial pH: 7 259, Base excess: -2 5; Venous gas was obtained for Twin A, however, lab unable to be resulted  Twin B: Arterial pH: 7 119, Base excess: -8 1; Venous pH: 7 222, Base excess: -7 8  2  Cord blood x 2  3  Segment of both umbilical cords  4  Placentas to pathology     Procedure Details  Decision was made to proceed with  section due to non-reassuring fetal status of Twin B in the setting of cHTN with superimposed pre-eclampsia with severe features  Patient was made aware of these findings and the proposed plan  Risks, benefits, possible complications, alternate treatment options, and expected outcomes were discussed with the patient  The patient agreed with the proposed plan and gave informed consent  The patient was taken to the operating room where she was properly identified to the OR staff and attending physician  She received spinal anesthesia by Dr Deborah Cannon preoperatively  Patient was placed in dorsal supine position with left lateral tilt of hips  Fetal heart tones were appreciated and found to be appropriate  A Olivas catheter and SCDs were then placed  The vagina was prepped with Betadine  The abdomen was prepped with Chloraprep and following appropriate drying time, the patient was draped in the usual sterile manner for a Pfannenstiel incision  The patient had received Ancef 2g IV pre-operatively for prophylaxis  A Time Out was held and the above information confirmed  The patient was identified as Dian Vanessa and the procedure verified as  Delivery  A Pfannenstiel incision was made and carried down through the underlying subcutaneous tissue to the fascia using a scalpel  Rectus fascia was then incised  We then proceeded in Jean-Pierre-Gordon fashion  All anatomic layers were well-demarcated   The rectus muscles were  and the peritoneum was identified, entered, and extended longitudinally with blunt dissection  The bladder blade was inserted  A low transverse uterine incision was made with the scalpel and extended laterally with blunt dissection  The amnion was entered sharply and noted to be clear  The Surgeon's hand was inserted through the hysterotomy and the fetal head of presenting twin A was palpated, elevated, and delivered through the uterine incision with the assistance of fundal pressure  The  had spontaneous cry with good color and tone  The umbilical cord was clamped and cut without delay due to suspicion of compromise of twin B  The infant was handed off to the  providers  The surgeon's hand was then re-inserted in the hysterotomy to assess presentation of Twin B  It was noted to be in breech presentation  The amnion was then entered sharply with an Minda clamp and noted to be clear  The presenting fetal feet were grasped and delivered through the uterine incision with delivery of the fetus up to the fetal scapula with fundal pressure  An index finger was slid along the right fetal scapula, over the shoulder, and into the antecubital fossa with an attempt to sweep the elbow downward for delivery, however this was unsuccessful  The trunk was then gently rotated clockwise and the left arm spontaneously delivered  The right arm then delivered atraumatically  The trunk was then positioned along the surgeon's right forearm, with the legs straddling  The Smellie-Veit maneuver was employed and using gentle traction, the head was then delivered  The umbilical cord was clamped and cut after a delay  The infant was handed off to the  providers  Arterial and venous cord gases, cord blood, and a segment of umbilical cord were obtained for evaluation and promptly sent to the lab  Both placentas were then expressed with uterine fundal massage and were noted to have 3-vessel cords   This was also sent to the lab for storage placental pathology  The uterus was exteriorized and a moist lap sponge was used to clear the cavity of clots and products of conception  The uterine incision was closed with a running locked suture of 0 Vicryl  A second layer of the same suture was used to imbricate the first  Good hemostasis was confirmed upon uterine closure  Warmed normal saline solution was used to irrigate the posterior culdesac and the uterus was returned to the abdomen  The paracolic gutters were inspected and cleared of all clots and debris with irrigation and moist lap sponges  The fascia was closed with a running suture of 0 Vicryl  Subcutaneous tissues were closed with 2-0 Plain suture  The skin was closed with 4-0 Monocryl in a subcuticular fashion  Histoacryl was applied  At the conclusion of the procedure, all needle, sponge, and instrument counts were noted to be correct x2  The patient tolerated the procedure well and was transferred to her the recovery room in stable condition  Brenda Leyva MD was present and participated in all key portions of the case      Complications:   None    Patient Disposition:  PACU  and hemodynamically stable    SIGNATURE: Charmaine Maddox MD  DATE: August 24, 2018  TIME: 5:43 PM

## 2018-08-24 NOTE — PATIENT INSTRUCTIONS
In the setting of your repetitive high blood pressures today, with your severe headache, I am recommending that you go to Labor and delivery by EMS now, for preeclampsia  Betamethasone 12 mg IM was given once

## 2018-08-24 NOTE — PROGRESS NOTES
Encounter amended to manually enter betamethasone order previously given verbally  Signout was given to EMS (which had arrived after our call to 911) when they arrived along with a printout of her AVS and my note  They don't have magnesium sulfate in transport but I asked for a peripheral IV to be placed  Report also called to L&D and Dr Yadiel Mosquera  JPG image of a sample of the NST is attached   Precious Colón MD

## 2018-08-24 NOTE — PLAN OF CARE
BIRTH - VAGINAL/ SECTION     Fetal and maternal status remain reassuring during the birth process [de-identified]     Emotionally satisfying birthing experience for mother/fetus [address] Discharge to home or other facility with appropriate resources Progressing        INFECTION - ADULT     Absence or prevention of progression during hospitalization Progressing     Absence of fever/infection during neutropenic period Progressing        Knowledge Deficit     Patient/family/caregiver demonstrates understanding of disease process, treatment plan, medications, and discharge instructions Progressing        PAIN - ADULT     Verbalizes/displays adequate comfort level or baseline comfort level Progressing        SAFETY ADULT     Patient will remain free of falls Progressing     Maintain or return to baseline ADL function Progressing     Maintain or return mobility status to optimal level Progressing

## 2018-08-25 LAB
BACTERIA UR CULT: NORMAL
BASOPHILS # BLD AUTO: 0.03 THOUSANDS/ΜL (ref 0–0.1)
BASOPHILS NFR BLD AUTO: 0 % (ref 0–1)
EOSINOPHIL # BLD AUTO: 0 THOUSAND/ΜL (ref 0–0.61)
EOSINOPHIL NFR BLD AUTO: 0 % (ref 0–6)
ERYTHROCYTE [DISTWIDTH] IN BLOOD BY AUTOMATED COUNT: 13.4 % (ref 11.6–15.1)
HCT VFR BLD AUTO: 35.3 % (ref 34.8–46.1)
HGB BLD-MCNC: 11.4 G/DL (ref 11.5–15.4)
IMM GRANULOCYTES # BLD AUTO: 0.18 THOUSAND/UL (ref 0–0.2)
IMM GRANULOCYTES NFR BLD AUTO: 1 % (ref 0–2)
LYMPHOCYTES # BLD AUTO: 1.12 THOUSANDS/ΜL (ref 0.6–4.47)
LYMPHOCYTES NFR BLD AUTO: 5 % (ref 14–44)
MCH RBC QN AUTO: 29.6 PG (ref 26.8–34.3)
MCHC RBC AUTO-ENTMCNC: 32.3 G/DL (ref 31.4–37.4)
MCV RBC AUTO: 92 FL (ref 82–98)
MONOCYTES # BLD AUTO: 1.23 THOUSAND/ΜL (ref 0.17–1.22)
MONOCYTES NFR BLD AUTO: 6 % (ref 4–12)
NEUTROPHILS # BLD AUTO: 19.15 THOUSANDS/ΜL (ref 1.85–7.62)
NEUTS SEG NFR BLD AUTO: 88 % (ref 43–75)
NRBC BLD AUTO-RTO: 0 /100 WBCS
PLATELET # BLD AUTO: 171 THOUSANDS/UL (ref 149–390)
PMV BLD AUTO: 12.9 FL (ref 8.9–12.7)
RBC # BLD AUTO: 3.85 MILLION/UL (ref 3.81–5.12)
RPR SER QL: NORMAL
WBC # BLD AUTO: 21.71 THOUSAND/UL (ref 4.31–10.16)

## 2018-08-25 PROCEDURE — 85025 COMPLETE CBC W/AUTO DIFF WBC: CPT | Performed by: OBSTETRICS & GYNECOLOGY

## 2018-08-25 RX ORDER — ACETAMINOPHEN 325 MG/1
650 TABLET ORAL EVERY 4 HOURS PRN
Status: DISCONTINUED | OUTPATIENT
Start: 2018-08-25 | End: 2018-08-30 | Stop reason: HOSPADM

## 2018-08-25 RX ORDER — OXYCODONE HYDROCHLORIDE AND ACETAMINOPHEN 5; 325 MG/1; MG/1
2 TABLET ORAL EVERY 4 HOURS PRN
Status: DISCONTINUED | OUTPATIENT
Start: 2018-08-25 | End: 2018-08-27

## 2018-08-25 RX ORDER — OXYCODONE HYDROCHLORIDE AND ACETAMINOPHEN 5; 325 MG/1; MG/1
1 TABLET ORAL EVERY 4 HOURS PRN
Status: DISCONTINUED | OUTPATIENT
Start: 2018-08-25 | End: 2018-08-27

## 2018-08-25 RX ADMIN — FLUTICASONE PROPIONATE 2 SPRAY: 50 SPRAY, METERED NASAL at 09:18

## 2018-08-25 RX ADMIN — ACETAMINOPHEN 650 MG: 325 TABLET, FILM COATED ORAL at 17:56

## 2018-08-25 RX ADMIN — NALBUPHINE HYDROCHLORIDE 5 MG: 10 INJECTION, SOLUTION INTRAMUSCULAR; INTRAVENOUS; SUBCUTANEOUS at 09:16

## 2018-08-25 RX ADMIN — KETOROLAC TROMETHAMINE 30 MG: 30 INJECTION, SOLUTION INTRAMUSCULAR at 05:08

## 2018-08-25 RX ADMIN — DIPHENHYDRAMINE HYDROCHLORIDE 25 MG: 50 INJECTION, SOLUTION INTRAMUSCULAR; INTRAVENOUS at 00:17

## 2018-08-25 RX ADMIN — KETOROLAC TROMETHAMINE 30 MG: 30 INJECTION, SOLUTION INTRAMUSCULAR at 00:17

## 2018-08-25 RX ADMIN — KETOROLAC TROMETHAMINE 30 MG: 30 INJECTION, SOLUTION INTRAMUSCULAR at 17:55

## 2018-08-25 RX ADMIN — ACETAMINOPHEN 650 MG: 325 TABLET, FILM COATED ORAL at 05:08

## 2018-08-25 RX ADMIN — ACETAMINOPHEN 650 MG: 325 TABLET, FILM COATED ORAL at 12:16

## 2018-08-25 RX ADMIN — DOCUSATE SODIUM 100 MG: 100 CAPSULE, LIQUID FILLED ORAL at 09:20

## 2018-08-25 RX ADMIN — DOCUSATE SODIUM 100 MG: 100 CAPSULE, LIQUID FILLED ORAL at 17:56

## 2018-08-25 RX ADMIN — MAGNESIUM SULFATE HEPTAHYDRATE 2 G/HR: 40 INJECTION, SOLUTION INTRAVENOUS at 00:25

## 2018-08-25 RX ADMIN — MAGNESIUM SULFATE HEPTAHYDRATE 2 G/HR: 40 INJECTION, SOLUTION INTRAVENOUS at 11:10

## 2018-08-25 RX ADMIN — KETOROLAC TROMETHAMINE 30 MG: 30 INJECTION, SOLUTION INTRAMUSCULAR at 11:07

## 2018-08-25 RX ADMIN — Medication 1 TABLET: at 09:20

## 2018-08-25 NOTE — PROGRESS NOTES
Progress Note - OB/GYN   Nimesh Mata 28 y o  female MRN: [id number]  Unit/Bed#: -01 Encounter: [id number]    Assessment:  Post partum/op Day #1 s/p 1LTCS, stable  Pregnancy at 34w6d  Beth Easter Twins  Non-reassuring fetal status of Twin B  CHTN with SI pre-eclampsia with severe features  Obesity  Rubella non-immune status  Bipolar disorder    Plan:  1)Continue routine post partum care  Encourage ambulation  Encourage breastfeeding  Pre-op hgb 12 2-->11 4  2) cHTN with SI pre-E w SF: s/p 20mg IV Labetalol on 8/24/18 pre-op  BPS:  131-149/66-86, on magnesium for ppx x 24hrs  Reassess at 1630  Pt asymptomatic  Labs WNL  3) Asthma: Home albuterol PRN  4) Rubella NI: MMR ordered  5) Delivery at 34w6d: s/p partial course of BTM pre-op  Babies in NICU for prematurity     Subjective/Objective   Chief Complaint:     Post delivery    Subjective:   Pt without complaint this AM  Denies HA, changes in vision, RUQ/Epigastric pain, N/V  Pain well controlled  Pain: no  Tolerating PO: yes  Voiding: yes via castillo  Flatus: no  BM: no  Ambulating: no  Breastfeeding:  yes  Chest pain: no  Shortness of breath: no  Leg pain: no  Lochia: minimal    Objective:     Vitals: /81   Pulse 71   Temp 98 1 °F (36 7 °C) (Oral)   Resp 20   Ht 5' 2" (1 575 m)   Wt 83 5 kg (184 lb)   LMP 12/23/2017 (Exact Date)   SpO2 96%   Breastfeeding?  Yes   BMI 33 65 kg/m²       Intake/Output Summary (Last 24 hours) at 08/25/18 0751  Last data filed at 08/25/18 0501   Gross per 24 hour   Intake          2854 69 ml   Output             3635 ml   Net          -780 31 ml       Lab Results   Component Value Date    WBC 21 71 (H) 08/25/2018    HGB 11 4 (L) 08/25/2018    HCT 35 3 08/25/2018    MCV 92 08/25/2018     08/25/2018       Meds/Allergies   Current Facility-Administered Medications   Medication Dose Route Frequency    acetaminophen (TYLENOL) tablet 650 mg  650 mg Oral Q6H PRN    acetaminophen (TYLENOL) tablet 650 mg  650 mg Oral Q6H    albuterol (PROVENTIL HFA,VENTOLIN HFA) inhaler 2 puff  2 puff Inhalation Q4H PRN    calcium carbonate (TUMS) chewable tablet 1,000 mg  1,000 mg Oral Daily PRN    calcium gluconate 10 % injection 1 g  1 g Intravenous Once PRN    dexamethasone (DECADRON) injection 8 mg  8 mg Intravenous Once PRN    diphenhydrAMINE (BENADRYL) injection 25 mg  25 mg Intravenous Q6H PRN    diphenhydrAMINE (BENADRYL) tablet 25 mg  25 mg Oral Q6H PRN    docusate sodium (COLACE) capsule 100 mg  100 mg Oral BID    fentaNYL (SUBLIMAZE) injection 50 mcg  50 mcg Intravenous Q3 min PRN    fluticasone (FLONASE) 50 mcg/act nasal spray 2 spray  2 spray Nasal Daily    hydrocortisone 1 % cream 1 application  1 application Topical 4x Daily PRN    HYDROmorphone (DILAUDID) injection 0 5 mg  0 5 mg Intravenous Q5 Min PRN    ibuprofen (MOTRIN) tablet 600 mg  600 mg Oral Q6H PRN    ketorolac (TORADOL) injection 30 mg  30 mg Intravenous Q6H CARLOZ    lactated ringers infusion  25 mL/hr Intravenous Continuous    magnesium sulfate 20 g/500 mL infusion (premix)  2 g/hr Intravenous Continuous    metoclopramide (REGLAN) injection 10 mg  10 mg Intravenous Once PRN    metoclopramide (REGLAN) injection 5 mg  5 mg Intravenous Q6H PRN    nalbuphine (NUBAIN) injection 5 mg  5 mg Intravenous Q3H PRN    ondansetron (ZOFRAN) injection 4 mg  4 mg Intravenous Q4H PRN    oxyCODONE-acetaminophen (PERCOCET) 5-325 mg per tablet 1 tablet  1 tablet Oral Q6H PRN    prenatal multivitamin tablet 1 tablet  1 tablet Oral Daily    simethicone (MYLICON) chewable tablet 80 mg  80 mg Oral Q6H PRN       Physical Exam:     Gen: AAOx3, NAD  CV: RRR  Lungs: CTA b/l  Abd: Soft, non-tender, mildly distended, no rebound or guarding  Uterine fundus firm and non-tender,  at the umbilicus   Incision: c/d/i without signs of infection or inflammation  Ext: Non tender    MD NELLA Khan, PGY-2  8/25/2018 7:55 AM

## 2018-08-25 NOTE — LACTATION NOTE
Mom C/O very tired due to delivery and Mag  Encouraged to start pumping as soon as possible  Mom to call when ready  Given admission  breastfeeding pkat

## 2018-08-25 NOTE — LACTATION NOTE
Mom states she is ready to pump now  Set up to start pumping reviewed technique and frequency, equipment cleaning and milk storage

## 2018-08-26 RX ORDER — LABETALOL HYDROCHLORIDE 5 MG/ML
INJECTION, SOLUTION INTRAVENOUS
Status: COMPLETED
Start: 2018-08-26 | End: 2018-08-26

## 2018-08-26 RX ORDER — LABETALOL HYDROCHLORIDE 5 MG/ML
20 INJECTION, SOLUTION INTRAVENOUS ONCE
Status: COMPLETED | OUTPATIENT
Start: 2018-08-26 | End: 2018-08-26

## 2018-08-26 RX ORDER — LABETALOL HYDROCHLORIDE 5 MG/ML
80 INJECTION, SOLUTION INTRAVENOUS ONCE
Status: COMPLETED | OUTPATIENT
Start: 2018-08-26 | End: 2018-08-26

## 2018-08-26 RX ORDER — NIFEDIPINE 30 MG/1
30 TABLET, EXTENDED RELEASE ORAL DAILY
Status: DISCONTINUED | OUTPATIENT
Start: 2018-08-26 | End: 2018-08-29

## 2018-08-26 RX ORDER — LABETALOL HYDROCHLORIDE 5 MG/ML
40 INJECTION, SOLUTION INTRAVENOUS ONCE
Status: COMPLETED | OUTPATIENT
Start: 2018-08-26 | End: 2018-08-26

## 2018-08-26 RX ADMIN — FLUTICASONE PROPIONATE 2 SPRAY: 50 SPRAY, METERED NASAL at 10:25

## 2018-08-26 RX ADMIN — LABETALOL HYDROCHLORIDE 40 MG: 5 INJECTION, SOLUTION INTRAVENOUS at 18:32

## 2018-08-26 RX ADMIN — OXYCODONE HYDROCHLORIDE AND ACETAMINOPHEN 2 TABLET: 5; 325 TABLET ORAL at 12:46

## 2018-08-26 RX ADMIN — IBUPROFEN 600 MG: 600 TABLET ORAL at 08:08

## 2018-08-26 RX ADMIN — DOCUSATE SODIUM 100 MG: 100 CAPSULE, LIQUID FILLED ORAL at 16:46

## 2018-08-26 RX ADMIN — OXYCODONE HYDROCHLORIDE AND ACETAMINOPHEN 2 TABLET: 5; 325 TABLET ORAL at 04:15

## 2018-08-26 RX ADMIN — OXYCODONE HYDROCHLORIDE AND ACETAMINOPHEN 2 TABLET: 5; 325 TABLET ORAL at 20:54

## 2018-08-26 RX ADMIN — OXYCODONE HYDROCHLORIDE AND ACETAMINOPHEN 2 TABLET: 5; 325 TABLET ORAL at 08:12

## 2018-08-26 RX ADMIN — LABETALOL 20 MG/4 ML (5 MG/ML) INTRAVENOUS SYRINGE 20 MG: at 17:17

## 2018-08-26 RX ADMIN — LABETALOL 20 MG/4 ML (5 MG/ML) INTRAVENOUS SYRINGE 40 MG: at 18:32

## 2018-08-26 RX ADMIN — LABETALOL 20 MG/4 ML (5 MG/ML) INTRAVENOUS SYRINGE 40 MG: at 22:04

## 2018-08-26 RX ADMIN — IBUPROFEN 600 MG: 600 TABLET ORAL at 23:30

## 2018-08-26 RX ADMIN — NIFEDIPINE 30 MG: 30 TABLET, FILM COATED, EXTENDED RELEASE ORAL at 19:30

## 2018-08-26 RX ADMIN — OXYCODONE HYDROCHLORIDE AND ACETAMINOPHEN 2 TABLET: 5; 325 TABLET ORAL at 16:54

## 2018-08-26 RX ADMIN — DOCUSATE SODIUM 100 MG: 100 CAPSULE, LIQUID FILLED ORAL at 08:12

## 2018-08-26 NOTE — SOCIAL WORK
MOB Brianna and FOB Lionel  [facility] for MOB  FOB involved and supportive  MOB states all baby items, ride home and good family supports  Plans to breast feed  [facility]IN referral sent to [facility] for Spectra breast pump  Plans to use [facility]  [place] for pediatrician  No hx children and youth  Denies need for any referrals for mental health/drug and alcohol  MOB states babies doing well at this time  No other needs noted

## 2018-08-26 NOTE — PROGRESS NOTES
Progress Note - OB/GYN   Judye Lombard Parkin 28 y o  female MRN: [id number]  Unit/Bed#: -01 Encounter: [id number]    Assessment:  Post partum/op Day #2 s/p 1LTCS, stable  Pregnancy at 34w6d  Jena Reese Twins  Non-reassuring fetal status of Twin B  CHTN with SI pre-eclampsia with severe features  Obesity  Rubella non-immune status  Bipolar disorder    Plan:  1)Continue routine post partum care  Encourage ambulation  Encourage breastfeeding  Pre-op hgb 12 2-->11 4  2) cHTN with SI pre-E w SF: s/p 20mg IV Labetalol on 8/24/18 pre-op  BPS:  128-156/66-99, s/p magnesium x 24hrs  Pt asymptomatic  Labs WNL  3) Asthma: Home albuterol PRN  4) Rubella NI: MMR ordered  5) Delivery at 34w6d: s/p partial course of BTM pre-op  Babies in NICU for prematurity     Subjective/Objective   Chief Complaint:     Post delivery    Subjective:   Pt without complaint this AM  Denies HA, changes in vision, RUQ/Epigastric pain, N/V  Pain well controlled  Pain: yes, improved with meds  Tolerating PO: yes  Voiding: yes   Flatus: yes  BM: no  Ambulating: yes  Breastfeeding:  yes  Chest pain: no  Shortness of breath: no  Leg pain: no  Lochia: minimal    Objective:     Vitals: /90   Pulse 66   Temp 97 8 °F (36 6 °C) (Oral)   Resp 18   Ht 5' 2" (1 575 m)   Wt 83 5 kg (184 lb)   LMP 12/23/2017 (Exact Date)   SpO2 98%   Breastfeeding?  Yes   BMI 33 65 kg/m²       Intake/Output Summary (Last 24 hours) at 08/26/18 0706  Last data filed at 08/26/18 0530   Gross per 24 hour   Intake          1708 75 ml   Output             3175 ml   Net         -1466 25 ml       Lab Results   Component Value Date    WBC 21 71 (H) 08/25/2018    HGB 11 4 (L) 08/25/2018    HCT 35 3 08/25/2018    MCV 92 08/25/2018     08/25/2018       Meds/Allergies   Current Facility-Administered Medications   Medication Dose Route Frequency    acetaminophen (TYLENOL) tablet 650 mg  650 mg Oral Q6H    acetaminophen (TYLENOL) tablet 650 mg  650 mg Oral Q4H PRN    albuterol (PROVENTIL HFA,VENTOLIN HFA) inhaler 2 puff  2 puff Inhalation Q4H PRN    calcium carbonate (TUMS) chewable tablet 1,000 mg  1,000 mg Oral Daily PRN    calcium gluconate 10 % injection 1 g  1 g Intravenous Once PRN    diphenhydrAMINE (BENADRYL) tablet 25 mg  25 mg Oral Q6H PRN    docusate sodium (COLACE) capsule 100 mg  100 mg Oral BID    fluticasone (FLONASE) 50 mcg/act nasal spray 2 spray  2 spray Nasal Daily    hydrocortisone 1 % cream 1 application  1 application Topical 4x Daily PRN    ibuprofen (MOTRIN) tablet 600 mg  600 mg Oral Q6H PRN    ketorolac (TORADOL) injection 30 mg  30 mg Intravenous Q6H [address]    measles-mumps-rubella (M-M-R II) subcutaneous injection 0 5 mL  0 5 mL Subcutaneous Once    oxyCODONE-acetaminophen (PERCOCET) 5-325 mg per tablet 1 tablet  1 tablet Oral Q4H PRN    oxyCODONE-acetaminophen (PERCOCET) 5-325 mg per tablet 1 tablet  1 tablet Oral Q6H PRN    oxyCODONE-acetaminophen (PERCOCET) 5-325 mg per tablet 2 tablet  2 tablet Oral Q4H PRN    prenatal multivitamin tablet 1 tablet  1 tablet Oral Daily    simethicone (MYLICON) chewable tablet 80 mg  80 mg Oral Q6H PRN       Physical Exam:     Gen: AAOx3, NAD  CV: RRR  Lungs: CTA b/l  Abd: Soft, non-tender, mildly distended, no rebound or guarding  Uterine fundus firm and non-tender,  at the umbilicus   Incision: c/d/i without signs of infection or inflammation  Ext: Non tender    Fanny Day MD  OBGYN, PGY-2  8/26/2018 7:06 AM

## 2018-08-27 LAB
ALBUMIN SERPL BCP-MCNC: 2.1 G/DL (ref 3.5–5)
ALP SERPL-CCNC: 272 U/L (ref 46–116)
ALT SERPL W P-5'-P-CCNC: 52 U/L (ref 12–78)
ANION GAP SERPL CALCULATED.3IONS-SCNC: 7 MMOL/L (ref 4–13)
AST SERPL W P-5'-P-CCNC: 50 U/L (ref 5–45)
BILIRUB SERPL-MCNC: 0.35 MG/DL (ref 0.2–1)
BUN SERPL-MCNC: 6 MG/DL (ref 5–25)
CALCIUM SERPL-MCNC: 8.1 MG/DL (ref 8.3–10.1)
CHLORIDE SERPL-SCNC: 104 MMOL/L (ref 100–108)
CO2 SERPL-SCNC: 27 MMOL/L (ref 21–32)
CREAT SERPL-MCNC: 0.59 MG/DL (ref 0.6–1.3)
ERYTHROCYTE [DISTWIDTH] IN BLOOD BY AUTOMATED COUNT: 13.7 % (ref 11.6–15.1)
GFR SERPL CREATININE-BSD FRML MDRD: 125 ML/MIN/1.73SQ M
GLUCOSE SERPL-MCNC: 94 MG/DL (ref 65–140)
HCT VFR BLD AUTO: 40.2 % (ref 34.8–46.1)
HGB BLD-MCNC: 12.8 G/DL (ref 11.5–15.4)
MCH RBC QN AUTO: 29.7 PG (ref 26.8–34.3)
MCHC RBC AUTO-ENTMCNC: 31.8 G/DL (ref 31.4–37.4)
MCV RBC AUTO: 93 FL (ref 82–98)
PLATELET # BLD AUTO: 174 THOUSANDS/UL (ref 149–390)
PMV BLD AUTO: 12.3 FL (ref 8.9–12.7)
POTASSIUM SERPL-SCNC: 4 MMOL/L (ref 3.5–5.3)
PROT SERPL-MCNC: 6.6 G/DL (ref 6.4–8.2)
RBC # BLD AUTO: 4.31 MILLION/UL (ref 3.81–5.12)
SODIUM SERPL-SCNC: 138 MMOL/L (ref 136–145)
WBC # BLD AUTO: 9.65 THOUSAND/UL (ref 4.31–10.16)

## 2018-08-27 PROCEDURE — 99024 POSTOP FOLLOW-UP VISIT: CPT | Performed by: OBSTETRICS & GYNECOLOGY

## 2018-08-27 PROCEDURE — 85027 COMPLETE CBC AUTOMATED: CPT | Performed by: OBSTETRICS & GYNECOLOGY

## 2018-08-27 PROCEDURE — 80053 COMPREHEN METABOLIC PANEL: CPT | Performed by: OBSTETRICS & GYNECOLOGY

## 2018-08-27 RX ORDER — SODIUM CHLORIDE, SODIUM LACTATE, POTASSIUM CHLORIDE, CALCIUM CHLORIDE 600; 310; 30; 20 MG/100ML; MG/100ML; MG/100ML; MG/100ML
25 INJECTION, SOLUTION INTRAVENOUS CONTINUOUS
Status: DISCONTINUED | OUTPATIENT
Start: 2018-08-27 | End: 2018-08-30 | Stop reason: HOSPADM

## 2018-08-27 RX ORDER — OXYCODONE HYDROCHLORIDE 10 MG/1
10 TABLET ORAL EVERY 4 HOURS PRN
Status: DISCONTINUED | OUTPATIENT
Start: 2018-08-27 | End: 2018-08-30 | Stop reason: HOSPADM

## 2018-08-27 RX ORDER — MAGNESIUM SULFATE HEPTAHYDRATE 40 MG/ML
2 INJECTION, SOLUTION INTRAVENOUS CONTINUOUS
Status: DISCONTINUED | OUTPATIENT
Start: 2018-08-27 | End: 2018-08-27

## 2018-08-27 RX ORDER — OXYCODONE HYDROCHLORIDE 5 MG/1
5 TABLET ORAL EVERY 4 HOURS PRN
Status: DISCONTINUED | OUTPATIENT
Start: 2018-08-27 | End: 2018-08-30 | Stop reason: HOSPADM

## 2018-08-27 RX ORDER — CALCIUM GLUCONATE 94 MG/ML
1 INJECTION, SOLUTION INTRAVENOUS ONCE
Status: DISCONTINUED | OUTPATIENT
Start: 2018-08-27 | End: 2018-08-27 | Stop reason: CLARIF

## 2018-08-27 RX ORDER — MAGNESIUM SULFATE HEPTAHYDRATE 40 MG/ML
4 INJECTION, SOLUTION INTRAVENOUS ONCE
Status: COMPLETED | OUTPATIENT
Start: 2018-08-27 | End: 2018-08-27

## 2018-08-27 RX ADMIN — MAGNESIUM SULFATE HEPTAHYDRATE 4 G: 40 INJECTION, SOLUTION INTRAVENOUS at 05:39

## 2018-08-27 RX ADMIN — SODIUM CHLORIDE, SODIUM LACTATE, POTASSIUM CHLORIDE, AND CALCIUM CHLORIDE 25 ML/HR: .6; .31; .03; .02 INJECTION, SOLUTION INTRAVENOUS at 06:00

## 2018-08-27 RX ADMIN — NIFEDIPINE 30 MG: 30 TABLET, FILM COATED, EXTENDED RELEASE ORAL at 08:24

## 2018-08-27 RX ADMIN — MAGNESIUM SULFATE HEPTAHYDRATE 2 G/HR: 40 INJECTION, SOLUTION INTRAVENOUS at 16:17

## 2018-08-27 RX ADMIN — Medication 1 TABLET: at 08:29

## 2018-08-27 RX ADMIN — ACETAMINOPHEN 650 MG: 325 TABLET, FILM COATED ORAL at 20:33

## 2018-08-27 RX ADMIN — DOCUSATE SODIUM 100 MG: 100 CAPSULE, LIQUID FILLED ORAL at 17:31

## 2018-08-27 RX ADMIN — OXYCODONE HYDROCHLORIDE AND ACETAMINOPHEN 2 TABLET: 5; 325 TABLET ORAL at 01:14

## 2018-08-27 RX ADMIN — DOCUSATE SODIUM 100 MG: 100 CAPSULE, LIQUID FILLED ORAL at 08:28

## 2018-08-27 RX ADMIN — OXYCODONE HYDROCHLORIDE 5 MG: 5 TABLET ORAL at 17:33

## 2018-08-27 RX ADMIN — OXYCODONE HYDROCHLORIDE 10 MG: 10 TABLET ORAL at 12:31

## 2018-08-27 RX ADMIN — OXYCODONE HYDROCHLORIDE 10 MG: 10 TABLET ORAL at 08:27

## 2018-08-27 RX ADMIN — IBUPROFEN 600 MG: 600 TABLET ORAL at 23:09

## 2018-08-27 RX ADMIN — DIPHENHYDRAMINE HCL 25 MG: 25 TABLET ORAL at 08:32

## 2018-08-27 RX ADMIN — MAGNESIUM SULFATE HEPTAHYDRATE 2 G/HR: 40 INJECTION, SOLUTION INTRAVENOUS at 06:00

## 2018-08-27 NOTE — LACTATION NOTE
Pumping three purple syringes full of breast milk  Now using 2 ounce bottles to pump into  Isidra Ortega and her significant other have no questions at this time  Encoraged MOB and FOB to call for assistance, questions and concerns  Extension number for inpatient lactation support provided

## 2018-08-27 NOTE — PROGRESS NOTES
I was called by nursing because the patient had a BP of 162/107 and a new onset right sided headache  I went to the room to evaluate the patient  Her headache is on the right side and she describes it as a burning and stabbing pain that is worse when she stands  She says that it is worse than any headache she had with her Pre-eclampsia  Percocet has not been effective pain management for her headaches  She denies changes in vision, confusion, numbness and tingling in her extremities and RUQ pain  PE  Gen: no acute distress, patient has ice pack on her forehead  Vision: no acute changes    Plan  - Restart Magnesium for headaches  - Consider consulting anesthesia during the day       Rush Hodgkins  OB/Gyn, PGY-1  8/27/2018  5:10 AM

## 2018-08-27 NOTE — PROGRESS NOTES
Progress Note - OB/GYN   Cornelious Postal [place] 28 y o  female MRN: [id number]  Unit/Bed#: -01 Encounter: [id number]    Assessment:  Post partum Day #3 s/p primary LTCS (twins), stable, babies in NICU for stabilization     Plan:  1  Post partum   - Continue routine post partum care  - Encourage ambulation  - Encourage breastfeeding    2  CHTN with SI PreE w SF (headache)  - s/p 24 hours Mag  Mag reordered this morning due to elevated pressures  - s/p 80 labetalol overnight at 2200 due to severe range pressures  - most recent severe range BP at 0600 152/101  BP range overnight: 120-170/    3  Asthma  - Albuterol PRN    4  Rubella NI: MMR    5  Discharge planning  - Discharge pending BP control        Subjective/Objective   Chief Complaint:     Post delivery  Patient is doing well  Lochia WNL  Pain well controlled  Subjective:     Pain: yes, cramping, improved with meds  Tolerating PO: yes  Voiding: yes  Flatus: yes  BM: no  Ambulating: yes  Breastfeeding:  pumping  Chest pain: no  Shortness of breath: no  Leg pain: no  Lochia: minimal    Objective:     Vitals: BP (!) 152/101 (BP Location: Right arm)   Pulse 60   Temp 98 °F (36 7 °C) (Oral)   Resp 18   Ht 5' 2" (1 575 m)   Wt 83 5 kg (184 lb)   LMP 12/23/2017 (Exact Date)   SpO2 98%   Breastfeeding? Yes   BMI 33 65 kg/m²     No intake or output data in the 24 hours ending 08/27/18 0631    Lab Results   Component Value Date    WBC 21 71 (H) 08/25/2018    HGB 11 4 (L) 08/25/2018    HCT 35 3 08/25/2018    MCV 92 08/25/2018     08/25/2018       Physical Exam:     General: Awake, Alert & Oriented x3  Head: Normocephalic  Atraumatic  CV: Regular rhythm and rate with normal S1/S2  No murmur, rub, or gallop  Respiratory: Equal breath sounds with symmetric chest rise  Clear to auscultation bilaterally  No wheezes, rhonchi, rales, or crackles  Abdomen: Soft, non-distended  No tenderness to palpation  No rigidity  MSK: No deformity   Mild LE edema or tenderness  : Uterine fundus firm and non-tender, 1 cm below the umbilicus  Lochia minimal  Incision clean dry and intact  No evidence of infection, bleeding, or erythema         Sue Bravo MD  8/27/2018  6:31 AM

## 2018-08-28 LAB
ATRIAL RATE: 68 BPM
P AXIS: 54 DEGREES
PR INTERVAL: 118 MS
QRS AXIS: 18 DEGREES
QRSD INTERVAL: 80 MS
QT INTERVAL: 404 MS
QTC INTERVAL: 429 MS
T WAVE AXIS: 70 DEGREES
VENTRICULAR RATE: 68 BPM

## 2018-08-28 PROCEDURE — 93005 ELECTROCARDIOGRAM TRACING: CPT

## 2018-08-28 PROCEDURE — 93010 ELECTROCARDIOGRAM REPORT: CPT | Performed by: INTERNAL MEDICINE

## 2018-08-28 RX ORDER — ECHINACEA PURPUREA EXTRACT 125 MG
2 TABLET ORAL AS NEEDED
Status: DISCONTINUED | OUTPATIENT
Start: 2018-08-28 | End: 2018-08-30 | Stop reason: HOSPADM

## 2018-08-28 RX ADMIN — DOCUSATE SODIUM 100 MG: 100 CAPSULE, LIQUID FILLED ORAL at 09:40

## 2018-08-28 RX ADMIN — OXYCODONE HYDROCHLORIDE 5 MG: 5 TABLET ORAL at 09:42

## 2018-08-28 RX ADMIN — ACETAMINOPHEN 650 MG: 325 TABLET, FILM COATED ORAL at 09:39

## 2018-08-28 RX ADMIN — NIFEDIPINE 30 MG: 30 TABLET, FILM COATED, EXTENDED RELEASE ORAL at 09:38

## 2018-08-28 RX ADMIN — DOCUSATE SODIUM 100 MG: 100 CAPSULE, LIQUID FILLED ORAL at 19:35

## 2018-08-28 RX ADMIN — IBUPROFEN 600 MG: 600 TABLET ORAL at 15:33

## 2018-08-28 RX ADMIN — OXYCODONE HYDROCHLORIDE 10 MG: 10 TABLET ORAL at 23:56

## 2018-08-28 RX ADMIN — Medication 1 TABLET: at 09:38

## 2018-08-28 NOTE — PROGRESS NOTES
Patient re-evaluated secondary to complaints about feeling uncomfortable on magnesium and wanting it to be discontinued early  Patient's BPs largely in mild range with only two severe BPs but each time repeat was back in mild range  Labs this am were unchanged from prior set  Patient has headache but thinks it is related to magnesium  It has not been present all day so may also be related to recent episode of agitation over wanting magnesium infusion to be stopped  Will given dose of ibuprofen now and monitor for improvement  Will stop magnesium now and continue close monitoring  All questions answered

## 2018-08-28 NOTE — PROGRESS NOTES
Progress Note - OB/GYN   Leandro Mata 28 y o  female MRN: [id number]  Unit/Bed#: -01 Encounter: [id number]    Assessment:  Post partum Day #4 s/p primary LTCS (twins), stable, babies in NICU for stabilization     Plan:  1  Post partum   - Continue routine post partum care  - Encourage ambulation  - Encourage pumping    2  CHTN with SI PreE w SF (headache)  - s/p 24 hours Mag x2  2nd Mag discontinued this morning    - most recent severe range BP on 8/27 @ 2013 165/108, repeat 148/102  BP range overnight: 130-160/      3  Asthma  - Albuterol PRN    4  Rubella NI: MMR    5  Discharge planning  - Discharge pending BP control        Subjective/Objective   Chief Complaint:     Post delivery  Patient is doing well  Lochia WNL  Pain well controlled  Patient denies headache this morning  Subjective:     Pain: no  Tolerating PO: yes  Voiding: yes  Flatus: yes  BM: no  Ambulating: yes  Breastfeeding:  pumping  Chest pain: no  Shortness of breath: no  Leg pain: no  Lochia: minimal    Objective:     Vitals: /96 Comment: nurse Victoria notified  Pulse 96   Temp 98 4 °F (36 9 °C) (Oral)   Resp 18   Ht 5' 2" (1 575 m)   Wt 83 5 kg (184 lb)   LMP 12/23/2017 (Exact Date)   SpO2 96%   Breastfeeding? Yes   BMI 33 65 kg/m²       Intake/Output Summary (Last 24 hours) at 08/28/18 0648  Last data filed at 08/27/18 2301   Gross per 24 hour   Intake          2034 59 ml   Output             1300 ml   Net           734 59 ml       Lab Results   Component Value Date    WBC 9 65 08/27/2018    HGB 12 8 08/27/2018    HCT 40 2 08/27/2018    MCV 93 08/27/2018     08/27/2018       Physical Exam:     General: Awake, Alert & Oriented x3  Head: Normocephalic  Atraumatic  CV: Regular rhythm and rate with normal S1/S2  No murmur, rub, or gallop  Respiratory: Equal breath sounds with symmetric chest rise  Clear to auscultation bilaterally  No wheezes, rhonchi, rales, or crackles  Abdomen: Soft, non-distended  No tenderness to palpation  No rigidity  MSK: No deformity  Mild LE edema or tenderness  : Uterine fundus firm and non-tender, 1 cm below the umbilicus  Lochia minimal  Incision clean dry and intact  No evidence of infection, bleeding, or erythema         Monalisa Nova MD  8/28/2018  6:48 AM

## 2018-08-28 NOTE — PLAN OF CARE
DISCHARGE PLANNING     Discharge to home or other facility with appropriate resources Progressing        INFECTION - ADULT     Absence or prevention of progression during hospitalization Progressing     Absence of fever/infection during neutropenic period Progressing        Knowledge Deficit     Patient/family/caregiver demonstrates understanding of disease process, treatment plan, medications, and discharge instructions Progressing        PAIN - ADULT     Verbalizes/displays adequate comfort level or baseline comfort level Progressing        POSTPARTUM     Experiences normal postpartum course Progressing     Appropriate maternal -  bonding Progressing     Establishment of infant feeding pattern Progressing     Incision(s), wounds(s) or drain site(s) healing without S/S of infection Progressing        SAFETY ADULT     Patient will remain free of falls Progressing     Maintain or return to baseline ADL function Progressing     Maintain or return mobility status to optimal level Progressing            Plan of care for today reviewed  Plan is for possible d/c tomorrow due to increased BP overnight  Pt agrees  with plan

## 2018-08-28 NOTE — PROGRESS NOTES
Progress Note - OB/GYN   Maki Celis Lenox 28 y o  female MRN: [id number]  Unit/Bed#: -01 Encounter: [id number]    Assessment:  29 Y/O POD#4 (, Twins, Preeclampsia) with mild Chest flutter and SOB  Plan:  EKG     Subjective/Objective   Chief Complaint: Mild Chest flutter/pressure with mild SOB  Subjective: On/off patient has been experiencing mild Chest flutter and SOB  Objective: Patient able to carry conversation with no signs of SOB  Vitals: Blood pressure (!) 153/103, pulse 67, temperature 98 6 °F (37 °C), temperature source Oral, resp  rate 18, height 5' 2" (1 575 m), weight 83 5 kg (184 lb), last menstrual period 2017, SpO2 97 %, currently breastfeeding  ,Body mass index is 33 65 kg/m²  Pulse ox 97% RA  Heart: RR, no murmors  Chest: CTA B/L  LE: Mild edema B/L, no rash, no erythema, no homans, Calf Cir same size B/L  Intake/Output Summary (Last 24 hours) at 18 1546  Last data filed at 18 0757   Gross per 24 hour   Intake          1734 59 ml   Output              700 ml   Net          1034 59 ml       Invasive Devices     Peripheral Intravenous Line            Peripheral IV 18 Left Hand 4 days                  Lab, Imaging and other studies: I have personally reviewed pertinent reports

## 2018-08-29 RX ORDER — NIFEDIPINE 30 MG/1
60 TABLET, EXTENDED RELEASE ORAL DAILY
Status: DISCONTINUED | OUTPATIENT
Start: 2018-08-30 | End: 2018-08-30 | Stop reason: HOSPADM

## 2018-08-29 RX ORDER — NIFEDIPINE 30 MG/1
30 TABLET, EXTENDED RELEASE ORAL DAILY
Status: COMPLETED | OUTPATIENT
Start: 2018-08-29 | End: 2018-08-29

## 2018-08-29 RX ADMIN — Medication 1 TABLET: at 08:34

## 2018-08-29 RX ADMIN — DOCUSATE SODIUM 100 MG: 100 CAPSULE, LIQUID FILLED ORAL at 18:21

## 2018-08-29 RX ADMIN — NIFEDIPINE 30 MG: 30 TABLET, FILM COATED, EXTENDED RELEASE ORAL at 08:35

## 2018-08-29 RX ADMIN — DOCUSATE SODIUM 100 MG: 100 CAPSULE, LIQUID FILLED ORAL at 08:35

## 2018-08-29 RX ADMIN — OXYCODONE HYDROCHLORIDE 10 MG: 10 TABLET ORAL at 09:46

## 2018-08-29 RX ADMIN — OXYCODONE HYDROCHLORIDE 10 MG: 10 TABLET ORAL at 18:34

## 2018-08-29 RX ADMIN — NIFEDIPINE 30 MG: 30 TABLET, FILM COATED, EXTENDED RELEASE ORAL at 09:44

## 2018-08-29 NOTE — LACTATION NOTE
Brianna pumping one breast at a time due to IV in anticubital area  Given education on Increasing Breast Milk Supply for  A Baby in the NICU  Demonstrated how to use the pumping log to accommodate expectations on production of breast milk and given a tube of fabric to secure breast pump flanges so mom could massage breasts while pumping to increase her supply  Encouraged mom to remove tube from breasts between pumping sessions to protect circulation to areolas  Encouraged Jo Lin to call for assistance, questions, and concerns about breastfeeding  Extension provided

## 2018-08-29 NOTE — PROGRESS NOTES
Progress Note - OB/GYN   Leatha Mata 28 y o  female MRN: [id number]  Unit/Bed#: -01 Encounter: [id number]    Assessment:  Post partum Day #5 s/p primary LTCS (twins), stable, babies in NICU for stabilization     Plan:  1  Post partum   - Continue routine post partum care  - Encourage ambulation  - Encourage pumping    2  CHTN with SI PreE w SF (headache)  - s/p 24 hours Mag x2  2nd Mag discontinued this morning    - most recent severe range BP on 8/27 @ 2013 165/108  BP range overnight: 130-150/      3  Asthma  - Albuterol PRN    4  Rubella NI: MMR    5  Discharge planning  - Discharge pending BP control        Subjective/Objective   Chief Complaint:     Post delivery  Patient is doing well  Lochia WNL  Pain well controlled  Patient denies headache this morning  Subjective:     Pain: no  Tolerating PO: yes  Voiding: yes  Flatus: yes  BM: yes  Ambulating: yes  Breastfeeding:  pumping  Chest pain: no  Shortness of breath: no  Leg pain: no  Lochia: minimal    Objective:     Vitals: /99 (BP Location: Right arm)   Pulse 72   Temp 98 8 °F (37 1 °C) (Oral)   Resp 16   Ht 5' 2" (1 575 m)   Wt 83 5 kg (184 lb)   LMP 12/23/2017 (Exact Date)   SpO2 98%   Breastfeeding? Yes   BMI 33 65 kg/m²       Intake/Output Summary (Last 24 hours) at 08/29/18 0627  Last data filed at 08/28/18 0757   Gross per 24 hour   Intake                0 ml   Output                0 ml   Net                0 ml       Lab Results   Component Value Date    WBC 9 65 08/27/2018    HGB 12 8 08/27/2018    HCT 40 2 08/27/2018    MCV 93 08/27/2018     08/27/2018       Physical Exam:     General: Sleepy & Oriented x3  Head: Normocephalic  Atraumatic  CV: Regular rhythm and rate with normal S1/S2  No murmur, rub, or gallop  Respiratory: Equal breath sounds with symmetric chest rise  Clear to auscultation bilaterally  No wheezes, rhonchi, rales, or crackles  Abdomen: Soft, non-distended   Appropriate tenderness to palpation  No rigidity  MSK: No deformity  Mild LE edema or tenderness  : Uterine fundus firm and non-tender, 1 cm below the umbilicus  Lochia minimal  Incision clean dry and intact  No evidence of infection, bleeding, or erythema         Isabella Goncalves MD  8/29/2018  6:27 AM

## 2018-08-29 NOTE — PLAN OF CARE
DISCHARGE PLANNING     Discharge to home or other facility with appropriate resources Not Progressing        INFECTION - ADULT     Absence or prevention of progression during hospitalization Not Progressing     Absence of fever/infection during neutropenic period Not Progressing        Knowledge Deficit     Patient/family/caregiver demonstrates understanding of disease process, treatment plan, medications, and discharge instructions Not Progressing        PAIN - ADULT     Verbalizes/displays adequate comfort level or baseline comfort level Not Progressing        POSTPARTUM     Experiences normal postpartum course Not Progressing     Appropriate maternal -  bonding Not Progressing     Establishment of infant feeding pattern Not Progressing     Incision(s), wounds(s) or drain site(s) healing without S/S of infection Not Progressing        SAFETY ADULT     Patient will remain free of falls Not Progressing     Maintain or return to baseline ADL function Not Progressing     Maintain or return mobility status to optimal level Not Progressing

## 2018-08-29 NOTE — CASE MANAGEMENT
08/24/18  ARNOLDO  34 6/7 WKS TWINS G 1  Preeclampsia with severe features with concerns for abruption    She endorses a headache x3d and "feels foggy " Frontal in location, has not tried anything for it  She denies visual changes or abdominal pain  IV MGSO4 INFUSION    C-SEC @ 16:23  BABY # 1/A  MALE  (PRICE)  APGARS 8/9  WT 2440 GRAMS  TAKEN TO NICU ON CPAP     BABY #2/B @ 16:24  FEMALE  (TORRES )  APGARS 3/9  WT 1960   TAKEN TO NICU CPAP    08-26-18  POD # 2  MGSO 4 INFUSION X 24  THAN D/C  2) cHTN with SI pre-E w SF: s/p 20mg IV Labetalol on 8/24/18 pre-op  BPS:  128-156/66-99, s/p magnesium x 24hrs    08-28-18  POD # 4   BP'S 148/107  154/102 153/103 137/94 137/94 151/99 162/110  IV MGSO 4 INFUSION RESTARTED  (+) H/A    08-29-18  POD  # 5   STARTED PROCARDIA 30 MG XL DAILY THAN INCREASED 60 [facility] DAILY STARTING 08-29-18  Assessment:  Post partum Day #5 s/p primary LTCS (twins), stable, babies in NICU for stabilization      Plan:  1  Post partum   - Continue routine post partum care  - Encourage ambulation  - Encourage pumping     2  CHTN with SI PreE w SF (headache)  - s/p 24 hours Mag x2  2nd Mag discontinued this morning    - most recent severe range BP on 8/27 @ 2013 165/108  BP range overnight: 130-150/

## 2018-08-30 VITALS
BODY MASS INDEX: 33.86 KG/M2 | DIASTOLIC BLOOD PRESSURE: 91 MMHG | WEIGHT: 184 LBS | TEMPERATURE: 98.4 F | HEIGHT: 62 IN | RESPIRATION RATE: 16 BRPM | OXYGEN SATURATION: 98 % | HEART RATE: 83 BPM | SYSTOLIC BLOOD PRESSURE: 151 MMHG

## 2018-08-30 PROCEDURE — 90707 MMR VACCINE SC: CPT | Performed by: OBSTETRICS & GYNECOLOGY

## 2018-08-30 PROCEDURE — 99024 POSTOP FOLLOW-UP VISIT: CPT | Performed by: OBSTETRICS & GYNECOLOGY

## 2018-08-30 RX ORDER — OXYCODONE HYDROCHLORIDE 5 MG/1
5 TABLET ORAL EVERY 4 HOURS PRN
Qty: 15 TABLET | Refills: 0 | Status: SHIPPED | OUTPATIENT
Start: 2018-08-30 | End: 2018-09-09

## 2018-08-30 RX ORDER — NIFEDIPINE 60 MG/1
60 TABLET, FILM COATED, EXTENDED RELEASE ORAL DAILY
Qty: 14 TABLET | Refills: 2 | Status: SHIPPED | OUTPATIENT
Start: 2018-08-31 | End: 2020-03-09 | Stop reason: ALTCHOICE

## 2018-08-30 RX ADMIN — OXYCODONE HYDROCHLORIDE 5 MG: 5 TABLET ORAL at 00:00

## 2018-08-30 RX ADMIN — DOCUSATE SODIUM 100 MG: 100 CAPSULE, LIQUID FILLED ORAL at 08:37

## 2018-08-30 RX ADMIN — OXYCODONE HYDROCHLORIDE 10 MG: 10 TABLET ORAL at 14:02

## 2018-08-30 RX ADMIN — MEASLES, MUMPS, AND RUBELLA VIRUS VACCINE LIVE 0.5 ML: 1000; 12500; 1000 INJECTION, POWDER, LYOPHILIZED, FOR SUSPENSION SUBCUTANEOUS at 14:03

## 2018-08-30 RX ADMIN — DIPHENHYDRAMINE HCL 25 MG: 25 TABLET ORAL at 00:00

## 2018-08-30 RX ADMIN — NIFEDIPINE 60 MG: 30 TABLET, FILM COATED, EXTENDED RELEASE ORAL at 08:37

## 2018-08-30 RX ADMIN — Medication 1 TABLET: at 08:37

## 2018-08-30 NOTE — PROGRESS NOTES
Progress Note - OB/GYN   Carin Mata 28 y o  female MRN: [id number]  Unit/Bed#: -01 Encounter: [id number]    Assessment:  Post partum Day #6 s/p primary LTCS (twins), stable, babies in NICU for stabilization     Plan:  1  Post partum   - Continue routine post partum care  - Encourage ambulation  - Encourage pumping    2  CHTN with SI PreE w SF (headache)  - s/p 24 hours Mag x2  2nd Mag discontinued this morning    - most recent severe range BP on 8/29 @ 0828 162/110  BP range overnight: 140-150/      3  Asthma  - Albuterol PRN    4  Rubella NI: MMR    5  Discharge planning  - Discharge pending BP control        Subjective/Objective   Chief Complaint:     Post delivery  Patient is doing well  Lochia WNL  Pain well controlled  Patient denies headache, blurry vision, nausea, vomiting, RUQ pain this morning  Subjective:     Pain: no  Tolerating PO: yes  Voiding: yes  Flatus: yes  BM: yes  Ambulating: yes  Breastfeeding:  pumping  Chest pain: no  Shortness of breath: no  Leg pain: no  Lochia: minimal    Objective:     Vitals: /90 Comment: nurse Victoria notified  Pulse 61   Temp 97 8 °F (36 6 °C) (Oral)   Resp 16   Ht 5' 2" (1 575 m)   Wt 83 5 kg (184 lb)   LMP 12/23/2017 (Exact Date)   SpO2 98%   Breastfeeding? Yes   BMI 33 65 kg/m²     No intake or output data in the 24 hours ending 08/30/18 0633    Lab Results   Component Value Date    WBC 9 65 08/27/2018    HGB 12 8 08/27/2018    HCT 40 2 08/27/2018    MCV 93 08/27/2018     08/27/2018       Physical Exam:     General: Sleepy & Oriented x3  Head: Normocephalic  Atraumatic  CV: Regular rhythm and rate with normal S1/S2  No murmur, rub, or gallop  Respiratory: Equal breath sounds with symmetric chest rise  Clear to auscultation bilaterally  No wheezes, rhonchi, rales, or crackles  Abdomen: Soft, non-distended  No tenderness to palpation  No rigidity  MSK: No deformity  No edema or tenderness     : Uterine fundus firm and non-tender, 1 cm below the umbilicus  Lochia minimal  Incision clean dry and intact  No evidence of infection, bleeding, or erythema         Shanna Feng MD  8/30/2018  6:33 AM

## 2018-09-06 ENCOUNTER — TELEPHONE (OUTPATIENT)
Dept: OBGYN CLINIC | Facility: CLINIC | Age: 28
End: 2018-09-06

## 2018-09-06 NOTE — TELEPHONE ENCOUNTER
Patient no showed for her 1 week appointment with [id number] [facility] today  She needs a blood pressure check  This can be double booked if needed with any provider  We need to make sure her blood pressure is ok  She just had twins a week ago

## 2018-09-06 NOTE — TELEPHONE ENCOUNTER
Left message for pt, needed to move her appt to 0800 on 9/7 with [facility]  Told pt to call back with any questions

## 2018-09-07 ENCOUNTER — DOCUMENTATION (OUTPATIENT)
Dept: PERINATAL CARE | Facility: CLINIC | Age: 28
End: 2018-09-07

## 2018-09-07 NOTE — PROGRESS NOTES
Pt arrived at  center stating she needed "my blood pressure checked"  Stated someone from [facility] office told her to come to [facility]  Pt is a postop  with  twins-currently visiting one twin in NICU at [place]  Reviewing notes in EPIC pt was a no show for per post partum visit with Dr Stephani Capps  Explained to pt it is important for her to contact [employer] office for a follow up appt  Pt verbalized understanding  After reading pt  of delivery notes-B/P rechecked manually and B/P 118/78  Pt states she had a slight headache last evening but none today  Pt continues to wear abdominal binder as needed and incision site clean and dry-no discharge,redness, inflammation or tenderness to touch  noted at site  Pain scale 2  Pt ambulatory with no complaints of discomfort  Pt states she is continuing her nifedipine 60 mg daily  Message will be sent to Dr Stephani Capps and Ana Laura Slade RN to read this note for update

## 2018-09-10 ENCOUNTER — TELEPHONE (OUTPATIENT)
Dept: OBGYN CLINIC | Facility: CLINIC | Age: 28
End: 2018-09-10

## 2018-09-10 NOTE — TELEPHONE ENCOUNTER
----- Message from Jay Lange MD sent at 9/10/2018 11:31 AM EDT -----  Regarding: FW: POST OP B/P check  This patient was a no-show for her post-op check  She was seen in [facility]  Please schedule her for her three week postpartum visit  I did not deliver her  I think she delivered with [facility] or Dr Twila Mckenzie, so her postpartum can go on anyone's schedule         ----- Message -----  From: Sabrina Mackenzie RN  Sent: 2018   3:52 PM  To: Maxi Chamorro RN, Jay Lange MD  Subject: POST OP B/P check                                Just an FYI-    Pt showed up at [facility] stating she needed  a blood pressure recheck  She is a twin post op  twins from a week ago that no showed for her post op appt  with Dr Divina Mayorga  All details are in my note from 2018-any questions feel free to call me  Thanks!   Veda Connor RN [facility]

## 2018-10-15 ENCOUNTER — TELEPHONE (OUTPATIENT)
Dept: OBGYN CLINIC | Facility: CLINIC | Age: 28
End: 2018-10-15

## 2018-10-15 NOTE — TELEPHONE ENCOUNTER
Pt delivered 8/24/18,  Pt needs a letter stating she is able to work with limitations,  No lifting over 10 lbs, & she was ok to return to work Aug 28    pls fax to [phone]

## 2018-10-16 NOTE — TELEPHONE ENCOUNTER
Spoke with pt - she delivered by  18  Patient wants a letter from us stating that she was allowed to go back to work with limitation (not lifting anything over 10 lbs) effective 18  States it has to be that date  She needs this letter for unemployment to get money backdated to that date  Please advise  Thanks!

## 2018-10-16 NOTE — TELEPHONE ENCOUNTER
Yes that is fine  But after 6 weeks the restrictions are lifted    You will have to look at the calender to see when the 6 weeks annette is

## 2018-10-17 NOTE — TELEPHONE ENCOUNTER
Spoke with Edwar Scott,  and Sis Sosa RN - they stated patient has missed her [facility] visit - she needs to be seen or that and could discuss this request at that time  [facility]

## 2018-11-05 ENCOUNTER — TELEPHONE (OUTPATIENT)
Dept: OBGYN CLINIC | Facility: MEDICAL CENTER | Age: 28
End: 2018-11-05

## 2018-11-05 NOTE — TELEPHONE ENCOUNTER
Spoke with Pt today via phone call  Pt states she missed her post partum appointment today due to medical appointment for other child  Pt re-scheduled for post partum appointment on 11/9/18 with Dr Jamison Rosario

## 2018-11-09 ENCOUNTER — TELEPHONE (OUTPATIENT)
Dept: OBGYN CLINIC | Facility: MEDICAL CENTER | Age: 28
End: 2018-11-09

## 2018-11-09 NOTE — TELEPHONE ENCOUNTER
Pt missed her post partum visit that was scheduled at 10:20 today with Dr Diana Glaser  This is the second time she missed her post partum visit  I called and left a message for her to reschedule

## 2018-12-07 ENCOUNTER — DOCUMENTATION (OUTPATIENT)
Dept: OBGYN CLINIC | Facility: CLINIC | Age: 28
End: 2018-12-07

## 2018-12-07 NOTE — PROGRESS NOTES
Certified letter that was mailed 11/09 for no show of a prenatal appointment   Letter was returned to the office as unclaimed on 12/03/18  -Jayson Davey

## 2019-04-03 ENCOUNTER — OFFICE VISIT (OUTPATIENT)
Dept: OBGYN CLINIC | Age: 29
End: 2019-04-03
Payer: COMMERCIAL

## 2019-04-03 VITALS
SYSTOLIC BLOOD PRESSURE: 124 MMHG | BODY MASS INDEX: 30.03 KG/M2 | WEIGHT: 163.2 LBS | HEIGHT: 62 IN | DIASTOLIC BLOOD PRESSURE: 80 MMHG

## 2019-04-03 DIAGNOSIS — IMO0001 BIRTH CONTROL: Primary | ICD-10-CM

## 2019-04-03 PROCEDURE — 99213 OFFICE O/P EST LOW 20 MIN: CPT | Performed by: NURSE PRACTITIONER

## 2019-04-03 RX ORDER — POLYETHYLENE GLYCOL 3350 17 G/17G
17 POWDER, FOR SOLUTION ORAL
COMMUNITY
Start: 2018-11-30 | End: 2022-06-24 | Stop reason: ALTCHOICE

## 2019-04-03 RX ORDER — MONTELUKAST SODIUM 10 MG/1
10 TABLET ORAL
COMMUNITY
Start: 2018-04-20

## 2019-04-03 RX ORDER — HYDROXYZINE HYDROCHLORIDE 10 MG/1
10 TABLET, FILM COATED ORAL
COMMUNITY
Start: 2017-03-31

## 2019-04-03 RX ORDER — MELOXICAM 15 MG/1
TABLET ORAL DAILY
COMMUNITY

## 2020-03-09 ENCOUNTER — OFFICE VISIT (OUTPATIENT)
Dept: OBGYN CLINIC | Facility: CLINIC | Age: 30
End: 2020-03-09
Payer: COMMERCIAL

## 2020-03-09 VITALS — WEIGHT: 151.4 LBS | BODY MASS INDEX: 27.69 KG/M2

## 2020-03-09 DIAGNOSIS — Z30.011 ENCOUNTER FOR INITIAL PRESCRIPTION OF CONTRACEPTIVE PILLS: Primary | ICD-10-CM

## 2020-03-09 PROCEDURE — 99213 OFFICE O/P EST LOW 20 MIN: CPT | Performed by: NURSE PRACTITIONER

## 2020-03-09 RX ORDER — NORETHINDRONE ACETATE AND ETHINYL ESTRADIOL 1MG-20(21)
1 KIT ORAL DAILY
Qty: 30 TABLET | Refills: 1 | Status: SHIPPED | OUTPATIENT
Start: 2020-03-09 | End: 2020-07-21 | Stop reason: SINTOL

## 2020-03-09 NOTE — PATIENT INSTRUCTIONS
Birth Control Pills   WHAT YOU NEED TO KNOW:   What are birth control pills? Birth control pills are also called oral contraceptives, or the pill  It is medicine that helps prevent pregnancy  Birth control pills work by preventing ovulation  Ovulation is when the ovaries make and release an egg cell each month  If this egg gets fertilized by sperm, pregnancy occurs  Birth control pills may also help to prevent pregnancy by keeping sperm from fertilizing an egg  What may be done before I can start taking birth control pills? You need to see your healthcare provider to get a prescription  Any of the following may be done before your healthcare provider gives you a prescription:  · Your healthcare provider will ask you about diseases and illnesses you have had in the past  He will check your risk for blood clots, heart conditions, or stroke  He will also check your blood pressure, and may do a breast and pelvic exam  A Pap smear may also be done during the pelvic exam  This is a test to make sure you do not have abnormal changes on your cervix  You may need other tests, such as a urine test, to make sure you are not pregnant  · Your healthcare provider will ask if you take any medicines and if you smoke  Smoking increases your risk for stroke, heart attack, or a blood clot in your lungs  If you smoke, you should not take certain kinds of birth control pills  What are the advantages of birth control pills? When birth control pills are used correctly, the chances of getting pregnant are very low  Birth control pills may help decrease bleeding and pain during your monthly period  They may also help prevent cancer of the uterus and ovaries  What are the disadvantages of birth control pills? You may have sudden changes in your mood or feelings while you take birth control pills  You may have nausea and decreased sex drive  You may have an increased appetite and rapid weight gain   You may also have bleeding in between periods, less frequent periods, vaginal dryness, and breast pain  Birth control pills will not protect you from sexually transmitted infections  Rarely, some birth control pills can increase your risk for a blood clot  This may become life-threatening  What should I do if I decide I want to get pregnant? If you are planning to have a baby, ask your healthcare provider when you may stop taking your birth control pills  It may take some time for you to start ovulating again  Ask your healthcare provider for more information about pregnancy after birth control pills  When should I start taking birth control pills after I have a baby? If you are not breastfeeding, you may start taking birth control pills 3 weeks after you give birth  You may be able to take certain types of birth control pills if you are breastfeeding  These pills can be started from 6 weeks to 6 months after you give birth  Ask your healthcare provider for more information about when to start taking birth control pills after you give birth  When should I contact my healthcare provider? · You have forgotten to take a birth control pill  · You have mood changes, such as depression, since starting birth control pills  · You have nausea or you are vomiting  · You have severe abdominal pain  · You missed a period and have questions or concerns about being pregnant  · You still have bleeding 4 months after taking birth control pills correctly  · You have questions or concerns about your condition or care  When should I seek immediate care or call 911? · Your arm or leg feels warm, tender, and painful  It may look swollen and red  · You feel lightheaded, short of breath, and have chest pain  · You cough up blood      · You have any of the following signs of a stroke:      ¨ Numbness or drooping on one side of your face     ¨ Weakness in an arm or leg    ¨ Confusion or difficulty speaking    ¨ Dizziness, a severe headache, or vision loss    · You have severe pain, numbness, or swelling in your arms or legs  CARE AGREEMENT:   You have the right to help plan your care  Learn about your health condition and how it may be treated  Discuss treatment options with your caregivers to decide what care you want to receive  You always have the right to refuse treatment  The above information is an  only  It is not intended as medical advice for individual conditions or treatments  Talk to your doctor, nurse or pharmacist before following any medical regimen to see if it is safe and effective for you  © 2017 [facility] Information is for End User's use only and may not be sold, redistributed or otherwise used for commercial purposes  All illustrations and images included in CareNotes® are the copyrighted property of A D A M [employer] Inc  or Andrew Mondragon

## 2020-03-09 NOTE — PROGRESS NOTES
Diagnoses and all orders for this visit:    1  Encounter for initial prescription of contraceptive pills  -     norethindrone-ethinyl estradiol (JUNEL FE 1/20) 1-20 MG-MCG per tablet; Take 1 tablet by mouth daily  Reviewed ACHES and not to miss any and take the same time daily  Needs annual appt, menses today  Other orders  -     Cancel: Liquid-based pap, screening          All questions and concerns answered  Patient to call with any questions  RTO in 1-2 months for annual           Pleasant 26 y o  premenopausal female here for birth control consult  She reports that her periods are normal flow and reports regular/irregular cycles  She is interested in discussing birth control options which include:  OCP  Has tried other options before, recently the patch, kept falling off she states  Also used OCP before, cant remmber the name  Sexually active without any concerns and pt declines STD testing  She denies smoking, cardiovascular disease, DVT/PE hx, known blood disorders and migraines with aura  Denies F/C/N/V  Past Medical History:   Diagnosis Date    Depression     Hypertension     Varicella      Past Surgical History:   Procedure Laterality Date     SECTION      MYRINGOTOMY W/ TUBES      [place]  DELIV[place] ONLY N/A 2018    Procedure:  SECTION (); Surgeon: Jean Brock MD;  [facility] ;  Service: Obstetrics     Family History   Problem Relation Age of Onset    Heart disease Father     Hypertension Father     Spina bifida Cousin     No Known Problems Mother     No Known Problems Brother     No Known Problems Daughter     No Known Problems Son     Breast cancer Neg Hx     Colon cancer Neg Hx     Ovarian cancer Neg Hx     Cervical cancer Neg Hx     Uterine cancer Neg Hx      Social History     Tobacco Use    Smoking status: Never Smoker    Smokeless tobacco: Never Used   Substance Use Topics    Alcohol use: No    Drug use:  No Current Outpatient Medications:     albuterol (PROVENTIL HFA,VENTOLIN HFA) 90 mcg/act inhaler, Two puffs every four hours as needed for wheezing, Disp: , Rfl:     cetirizine (ZyrTEC) 10 mg tablet, Take 10 mg by mouth, Disp: , Rfl:     fluticasone (FLONASE) 50 mcg/act nasal spray, 2 sprays into each nostril, Disp: , Rfl:     hydrOXYzine HCL (ATARAX) 10 mg tablet, Take 10 mg by mouth, Disp: , Rfl:     meloxicam (MOBIC) 15 mg tablet, Daily, Disp: , Rfl:     montelukast (SINGULAIR) 10 mg tablet, Take 10 mg by mouth, Disp: , Rfl:     polyethylene glycol (MIRALAX) powder, Take 17 g by mouth, Disp: , Rfl:     Prenatal Vit-Fe Fumarate-FA (PNV PRENATAL PLUS MULTIVITAMIN) 27-1 MG TABS, Take by mouth, Disp: , Rfl:     norethindrone-ethinyl estradiol (JUNEL FE ) 1-20 MG-MCG per tablet, Take 1 tablet by mouth daily, Disp: 30 tablet, Rfl: 1  Patient Active Problem List    Diagnosis Date Noted    Birth control 2019    34 weeks gestation of pregnancy 2018    Chronic hypertension in obstetric context in third trimester 2018    Dichorionic diamniotic twin pregnancy in third trimester 05/15/2018    B[facility]    Rubella non-immune status, antepartum 2018    Maternal obesity affecting pregnancy, antepartum 2018    Poor dentition 2018    Perceptive hearing loss, bilateral 2018    Vertigo 2018    Recurrent UTI 2017    Other specific developmental learning difficulties 2004    Attention deficit disorder without hyperactivity 2003       Allergies   Allergen Reactions    Poison Ivy Extract Rash       OB History    Para Term  AB Living   2 2   1   2   SAB TAB Ectopic Multiple Live Births         1 2      # Outcome Date GA Lbr Eliu/2nd Weight Sex Delivery Anes PTL Lv   2A  18 34w6d  2440 g (5 lb 6 1 oz) M CS-LTranv Spinal N CHAITANYA   2B  18 34w6d  1960 g (4 lb 5 1 oz) F CS-LTranv Spinal N CHAITANYA      Complications: Fetal Intolerance   1 Para                Vitals:    03/09/20 1506   Weight: 68 7 kg (151 lb 6 4 oz)     Body mass index is 27 69 kg/m²  Review of Systems   Constitutional: Negative for chills, fatigue, fever and unexpected weight change  Respiratory: Negative for shortness of breath  Gastrointestinal: Negative for anal bleeding, blood in stool, constipation and diarrhea  Genitourinary: Negative for difficulty urinating, dysuria and hematuria  OBGyn Exam    Physical Exam   Constitutional: She appears well-developed and well-nourished  No distress  Head: Normocephalic  Neck: Normal range of motion  Neck supple  Cardiac: S1& S2 sounds presents, Regular rate and rhythm  Pulmonary: Effort normal  Lungs sound clear  Abdominal: Soft   Non-tender  Pelvic exam was Deferred

## 2020-03-15 ENCOUNTER — HOSPITAL ENCOUNTER (EMERGENCY)
Facility: HOSPITAL | Age: 30
Discharge: HOME | End: 2020-03-15
Attending: EMERGENCY MEDICINE
Payer: COMMERCIAL

## 2020-03-15 VITALS
HEART RATE: 90 BPM | SYSTOLIC BLOOD PRESSURE: 142 MMHG | HEIGHT: 62 IN | BODY MASS INDEX: 27.3 KG/M2 | TEMPERATURE: 98.7 F | WEIGHT: 148.37 LBS | RESPIRATION RATE: 17 BRPM | DIASTOLIC BLOOD PRESSURE: 91 MMHG | OXYGEN SATURATION: 98 %

## 2020-03-15 DIAGNOSIS — R51.9 HEADACHE: Primary | ICD-10-CM

## 2020-03-15 DIAGNOSIS — R07.9 CHEST PAIN: ICD-10-CM

## 2020-03-15 LAB
ATRIAL RATE: 86 BPM
P AXIS: 63 DEGREES
PR INTERVAL: 136 MS
QRS AXIS: 64 DEGREES
QRSD INTERVAL: 80 MS
QT INTERVAL: 354 MS
QTC INTERVAL: 423 MS
T WAVE AXIS: 22 DEGREES
VENTRICULAR RATE: 86 BPM

## 2020-03-15 PROCEDURE — 99284 EMERGENCY DEPT VISIT MOD MDM: CPT | Performed by: EMERGENCY MEDICINE

## 2020-03-15 PROCEDURE — 93010 ELECTROCARDIOGRAM REPORT: CPT | Performed by: INTERNAL MEDICINE

## 2020-03-15 PROCEDURE — 99283 EMERGENCY DEPT VISIT LOW MDM: CPT

## 2020-03-15 PROCEDURE — 93005 ELECTROCARDIOGRAM TRACING: CPT

## 2020-03-15 RX ORDER — ACETAMINOPHEN 325 MG/1
650 TABLET ORAL ONCE
Status: COMPLETED | OUTPATIENT
Start: 2020-03-15 | End: 2020-03-15

## 2020-03-15 RX ADMIN — ACETAMINOPHEN 650 MG: 325 TABLET ORAL at 21:38

## 2020-03-16 ENCOUNTER — OFFICE VISIT (OUTPATIENT)
Dept: URGENT CARE | Facility: MEDICAL CENTER | Age: 30
End: 2020-03-16
Payer: COMMERCIAL

## 2020-03-16 VITALS
RESPIRATION RATE: 18 BRPM | HEART RATE: 112 BPM | BODY MASS INDEX: 27.05 KG/M2 | WEIGHT: 147 LBS | OXYGEN SATURATION: 100 % | DIASTOLIC BLOOD PRESSURE: 84 MMHG | SYSTOLIC BLOOD PRESSURE: 132 MMHG | TEMPERATURE: 100.6 F | HEIGHT: 62 IN

## 2020-03-16 DIAGNOSIS — R30.0 DYSURIA: ICD-10-CM

## 2020-03-16 DIAGNOSIS — R10.9 FLANK PAIN: Primary | ICD-10-CM

## 2020-03-16 LAB
SL AMB  POCT GLUCOSE, UA: ABNORMAL
SL AMB LEUKOCYTE ESTERASE,UA: ABNORMAL
SL AMB POCT BILIRUBIN,UA: ABNORMAL
SL AMB POCT BLOOD,UA: ABNORMAL
SL AMB POCT CLARITY,UA: ABNORMAL
SL AMB POCT COLOR,UA: ABNORMAL
SL AMB POCT KETONES,UA: 80
SL AMB POCT NITRITE,UA: ABNORMAL
SL AMB POCT PH,UA: 6
SL AMB POCT SPECIFIC GRAVITY,UA: 1.02
SL AMB POCT URINE PROTEIN: 100
SL AMB POCT UROBILINOGEN: 2

## 2020-03-16 PROCEDURE — 93005 ELECTROCARDIOGRAM TRACING: CPT | Performed by: PHYSICIAN ASSISTANT

## 2020-03-16 PROCEDURE — S9088 SERVICES PROVIDED IN URGENT: HCPCS | Performed by: PHYSICIAN ASSISTANT

## 2020-03-16 PROCEDURE — 81002 URINALYSIS NONAUTO W/O SCOPE: CPT | Performed by: PHYSICIAN ASSISTANT

## 2020-03-16 PROCEDURE — 99213 OFFICE O/P EST LOW 20 MIN: CPT | Performed by: PHYSICIAN ASSISTANT

## 2020-03-16 RX ORDER — CIPROFLOXACIN 500 MG/1
500 TABLET, FILM COATED ORAL EVERY 12 HOURS SCHEDULED
Qty: 14 TABLET | Refills: 0 | Status: SHIPPED | OUTPATIENT
Start: 2020-03-16 | End: 2020-03-23

## 2020-03-16 NOTE — ED PROVIDER NOTES
History  Chief Complaint   Patient presents with    High Blood Pressure     pt states she is experiencing headaches thst started on saturday and eleievs her blood pressure has been high  pt also has been feeling hot and feels like her heart is racing, denies any fevers     Pt comes to ED with several days of episodic HA, feeling her heart racing, and feeling like her BP is high  Denies fever  Denies syncope, cough, hemoptysis, and focal weakness  She denies stimulant use  She notes come chest tightness at this time but this was previously absent  She denies leg pain and swelling  She denies h/o VTE  Additional history provided by her mother  Prior to Admission Medications   Prescriptions Last Dose Informant Patient Reported? Taking? Prenatal Vit-Fe Fumarate-FA (PNV PRENATAL PLUS MULTIVITAMIN) 27-1 MG TABS  Self Yes No   Sig: Take by mouth   albuterol (PROVENTIL HFA,VENTOLIN HFA) 90 mcg/act inhaler  Self Yes No   Sig: Two puffs every four hours as needed for wheezing   cetirizine (ZyrTEC) 10 mg tablet  Self Yes No   Sig: Take 10 mg by mouth   fluticasone (FLONASE) 50 mcg/act nasal spray  Self Yes No   Si sprays into each nostril   hydrOXYzine HCL (ATARAX) 10 mg tablet  Self Yes No   Sig: Take 10 mg by mouth   meloxicam (MOBIC) 15 mg tablet  Self Yes No   Sig: Daily   montelukast (SINGULAIR) 10 mg tablet  Self Yes No   Sig: Take 10 mg by mouth   norethindrone-ethinyl estradiol (JUNEL FE 1/20) 1-20 MG-MCG per tablet   No No   Sig: Take 1 tablet by mouth daily   polyethylene glycol (MIRALAX) powder  Self Yes No   Sig: Take 17 g by mouth      Facility-Administered Medications: None       Past Medical History:   Diagnosis Date    Depression     Hypertension     Varicella        Past Surgical History:   Procedure Laterality Date     SECTION      MYRINGOTOMY W/ TUBES      [place]  DELIVERY ONLY N/A 2018    Procedure:  SECTION ();   Surgeon: Christina Seay MD; Location: [facility] ;  Service: Obstetrics       Family History   Problem Relation Age of Onset    Heart disease Father     Hypertension Father     Spina bifida Cousin     No Known Problems Mother     No Known Problems Brother     No Known Problems Daughter     No Known Problems Son     Breast cancer Neg Hx     Colon cancer Neg Hx     Ovarian cancer Neg Hx     Cervical cancer Neg Hx     Uterine cancer Neg Hx      I have reviewed and agree with the history as documented  E-Cigarette/Vaping     E-Cigarette/Vaping Substances     Social History     Tobacco Use    Smoking status: Never Smoker    Smokeless tobacco: Never Used   Substance Use Topics    Alcohol use: No    Drug use: No       Review of Systems   Respiratory: Positive for chest tightness  Cardiovascular: Positive for palpitations  Neurological: Positive for headaches  Negative for syncope and weakness  All other systems reviewed and are negative  Physical Exam  Physical Exam   Constitutional: She is oriented to person, place, and time  She appears well-developed and well-nourished  No distress  HENT:   Head: Normocephalic and atraumatic  Eyes: Pupils are equal, round, and reactive to light  Conjunctivae and EOM are normal    Neck: Normal range of motion  Neck supple  No JVD present  Cardiovascular: Normal rate, regular rhythm, normal heart sounds and intact distal pulses  Pulmonary/Chest: Effort normal and breath sounds normal  No stridor  Abdominal: Soft  She exhibits no distension  There is no tenderness  There is no rebound and no guarding  Musculoskeletal: Normal range of motion  She exhibits no edema, tenderness or deformity  Neurological: She is alert and oriented to person, place, and time  No cranial nerve deficit or sensory deficit  She exhibits normal muscle tone  Coordination normal    Skin: Skin is warm and dry  Capillary refill takes less than 2 seconds  No rash noted  She is not diaphoretic  No erythema   No pallor  Nursing note and vitals reviewed  Vital Signs  ED Triage Vitals   Temperature Pulse Respirations Blood Pressure SpO2   03/15/20 2110 03/15/20 2110 03/15/20 2110 03/15/20 2110 03/15/20 2110   98 7 °F (37 1 °C) 90 17 142/91 98 %      Temp Source Heart Rate Source Patient Position - Orthostatic VS BP Location FiO2 (%)   03/15/20 2110 03/15/20 2110 03/15/20 2110 03/15/20 2110 --   Oral Monitor Sitting Right arm       Pain Score       03/15/20 2117       5           Vitals:    03/15/20 2110   BP: 142/91   Pulse: 90   Patient Position - Orthostatic VS: Sitting         Visual Acuity  Visual Acuity      Most Recent Value   L Pupil Size (mm)  3   R Pupil Size (mm)  3          ED Medications  Medications   acetaminophen (TYLENOL) tablet 650 mg (650 mg Oral Given 3/15/20 2138)       Diagnostic Studies  Results Reviewed     None                 No orders to display              Procedures  ECG 12 Lead Documentation Only  Date/Time: 3/15/2020 9:41 PM  Performed by: Daniele Dawkins MD  Authorized by: Daniele Dawkins MD     Indications / Diagnosis:  Chest tightness  ECG reviewed by me, the ED Provider: yes    Patient location:  ED  Previous ECG:     Previous ECG:  Compared to current    Comparison ECG info:  28 aug 2018    Similarity:  No change  Interpretation:     Interpretation: non-specific    Rate:     ECG rate:  86    ECG rate assessment: normal    Comments:      SR, nonspecific ST changes, unchanged from prior  Impression - nondiagnostic ekg                ED Course                                 MDM      Disposition  Final diagnoses:   Headache   Chest pain     Time reflects when diagnosis was documented in both MDM as applicable and the Disposition within this note     Time User Action Codes Description Comment    3/15/2020  9:35 PM Rosalio Baker [R51] Headache     3/15/2020  9:35 PM Rosalio Baker [R07 9] Chest pain       ED Disposition     ED Disposition Condition Date/Time Comment    Discharge Stable Sun Mar 15, 2020  9:35 PM Jesse Mata discharge to home/self care  Follow-up Information    None         Patient's Medications   Discharge Prescriptions    No medications on file     No discharge procedures on file      PDMP Review     None          ED Provider  Electronically Signed by           Jodi Hayward MD  03/15/20 7877       Jodi Hayward MD  03/15/20 6496

## 2020-03-17 LAB
ATRIAL RATE: 112 BPM
P AXIS: 58 DEGREES
PR INTERVAL: 130 MS
QRS AXIS: 38 DEGREES
QRSD INTERVAL: 86 MS
QT INTERVAL: 306 MS
QTC INTERVAL: 417 MS
T WAVE AXIS: 43 DEGREES
VENTRICULAR RATE: 112 BPM

## 2020-03-17 PROCEDURE — 93010 ELECTROCARDIOGRAM REPORT: CPT | Performed by: INTERNAL MEDICINE

## 2020-03-17 NOTE — PROGRESS NOTES
[facility]        NAME: Bettie Kohc is a 26 y o  female  : 1990    MRN: [id number]  DATE: 2020  TIME: 9:28 PM    Assessment and Plan   Flank pain [R10 9]  1  Flank pain  POCT urine dip   2  Dysuria           Patient Instructions      dysuria  cipro twice daily  Follow up with PCP in 3-5 days  Proceed to  ER if symptoms worsen  Chief Complaint     Chief Complaint   Patient presents with    Possible UTI     Gross hematuria, foul smelling urine, bilateral flank pain  x 1 month   Chest Pain     c/o pounding in chest, severe chest pain, increased heart rate x 3 days  History of Present Illness       25 y/o female presents c/o frequency, urgency and dysuria x 3 days  States she developed fever 2 days ago and feels her heart race with the fever  Denies chest pain, SOB, nausea, vomiting, diaphoresis  Patient was seen yesterday in the ER for similar complains      Review of Systems   Review of Systems   Constitutional: Negative  HENT: Negative  Eyes: Negative  Respiratory: Negative  Negative for cough, chest tightness, shortness of breath, wheezing and stridor  Cardiovascular: Negative  Negative for chest pain, palpitations and leg swelling  Gastrointestinal: Negative  Genitourinary: Positive for dysuria, frequency and urgency  Negative for decreased urine volume, difficulty urinating, dyspareunia, enuresis, flank pain, genital sores, hematuria, menstrual problem, pelvic pain, vaginal bleeding, vaginal discharge and vaginal pain           Current Medications       Current Outpatient Medications:     hydrOXYzine HCL (ATARAX) 10 mg tablet, Take 10 mg by mouth, Disp: , Rfl:     albuterol (PROVENTIL HFA,VENTOLIN HFA) 90 mcg/act inhaler, Two puffs every four hours as needed for wheezing, Disp: , Rfl:     cetirizine (ZyrTEC) 10 mg tablet, Take 10 mg by mouth, Disp: , Rfl:     fluticasone (FLONASE) 50 mcg/act nasal spray, 2 sprays into each nostril, Disp: , Rfl:   meloxicam (MOBIC) 15 mg tablet, Daily, Disp: , Rfl:     montelukast (SINGULAIR) 10 mg tablet, Take 10 mg by mouth, Disp: , Rfl:     norethindrone-ethinyl estradiol (JUNEL FE ) 1-20 MG-MCG per tablet, Take 1 tablet by mouth daily (Patient not taking: Reported on 3/16/2020), Disp: 30 tablet, Rfl: 1    polyethylene glycol (MIRALAX) powder, Take 17 g by mouth, Disp: , Rfl:     Prenatal Vit-Fe Fumarate-FA (PNV PRENATAL PLUS MULTIVITAMIN) 27-1 MG TABS, Take by mouth, Disp: , Rfl:   No current facility-administered medications for this visit  Current Allergies     Allergies as of 2020 - Reviewed 2020   Allergen Reaction Noted    Poison ivy extract Rash 2015            The following portions of the patient's history were reviewed and updated as appropriate: allergies, current medications, past family history, past medical history, past social history, past surgical history and problem list      Past Medical History:   Diagnosis Date    Depression     Hypertension     Varicella        Past Surgical History:   Procedure Laterality Date     SECTION      MYRINGOTOMY W/ TUBES      [place]  DELIVERY ONLY N/A 2018    Procedure:  SECTION (); Surgeon: Thi Montalvo MD;  [facility] ;  Service: Obstetrics       Family History   Problem Relation Age of Onset    Heart disease Father     Hypertension Father     Spina bifida Cousin     No Known Problems Mother     No Known Problems Brother     No Known Problems Daughter     No Known Problems Son     Breast cancer Neg Hx     Colon cancer Neg Hx     Ovarian cancer Neg Hx     Cervical cancer Neg Hx     Uterine cancer Neg Hx          Medications have been verified          Objective   /84   Pulse (!) 128   Temp (!) 100 6 °F (38 1 °C) (Temporal)   Resp 18   Ht 5' 2" (1 575 m)   Wt 66 7 kg (147 lb)   LMP 2020 (Approximate)   SpO2 100%   BMI 26 89 kg/m²        Physical Exam Physical Exam   Constitutional: She appears well-developed and well-nourished  HENT:   Head: Normocephalic and atraumatic  Neck: Normal range of motion  Neck supple  Cardiovascular: Normal rate, regular rhythm, normal heart sounds and intact distal pulses  Pulmonary/Chest: Effort normal and breath sounds normal  No stridor  No respiratory distress  She has no wheezes  She has no rales  She exhibits no tenderness  Abdominal: Soft  Bowel sounds are normal  She exhibits no distension and no mass  There is no tenderness  There is no rebound and no guarding  Lymphadenopathy:     She has no cervical adenopathy

## 2020-03-17 NOTE — PATIENT INSTRUCTIONS
dysuria  cipro twice daily  Follow up with PCP in 3-5 days  Proceed to  ER if symptoms worsen  Dysuria   WHAT YOU NEED TO KNOW:   Dysuria is difficulty urinating, or pain, burning, or discomfort with urination  Dysuria is usually a symptom of another problem  DISCHARGE INSTRUCTIONS:   Return to the emergency department if:   · You have severe back, side, or abdominal pain  · You have fever and shaking chills  · You vomit several times in a row  Contact your healthcare provider if:   · Your symptoms do not go away, even after treatment  · You have questions or concerns about your condition or care  Medicines:   · Medicines  may be given to help treat a bacterial infection or help decrease bladder spasms  · Take your medicine as directed  Contact your healthcare provider if you think your medicine is not helping or if you have side effects  Tell him of her if you are allergic to any medicine  Keep a list of the medicines, vitamins, and herbs you take  Include the amounts, and when and why you take them  Bring the list or the pill bottles to follow-up visits  Carry your medicine list with you in case of an emergency  Follow up with your healthcare provider as directed: Your healthcare provider may also refer you to a urologist or nephrologist to have additional testing  Write down your questions so you remember to ask them during your visits  Manage your dysuria:   · Drink more liquids  Liquids help flush out bacteria that may be causing an infection  Ask your healthcare provider how much liquid to drink each day and which liquids are best for you  · Take sitz baths as directed  Fill a bathtub with 4 to 6 inches of warm water  You may also use a sitz bath pan that fits over a toilet  Sit in the sitz bath for 20 minutes  Do this 2 to 3 times a day, or as directed  The warm water can help decrease pain and swelling     © 2017 [employer] Information is for End User's use only and may not be sold, redistributed or otherwise used for commercial purposes  All illustrations and images included in CareNotes® are the copyrighted property of A D [employer] M [employer] Inc  or Andrew Mondragon  The above information is an  only  It is not intended as medical advice for individual conditions or treatments  Talk to your doctor, nurse or pharmacist before following any medical regimen to see if it is safe and effective for you

## 2020-04-01 ENCOUNTER — TELEPHONE (OUTPATIENT)
Dept: OBGYN CLINIC | Facility: CLINIC | Age: 30
End: 2020-04-01

## 2020-06-18 ENCOUNTER — OFFICE VISIT (OUTPATIENT)
Dept: OBGYN CLINIC | Age: 30
End: 2020-06-18
Payer: COMMERCIAL

## 2020-06-18 VITALS
WEIGHT: 149 LBS | DIASTOLIC BLOOD PRESSURE: 70 MMHG | SYSTOLIC BLOOD PRESSURE: 118 MMHG | HEIGHT: 62 IN | BODY MASS INDEX: 27.42 KG/M2

## 2020-06-18 DIAGNOSIS — Z30.09 BIRTH CONTROL COUNSELING: Primary | ICD-10-CM

## 2020-06-18 PROBLEM — O99.210 MATERNAL OBESITY AFFECTING PREGNANCY, ANTEPARTUM: Status: RESOLVED | Noted: 2018-03-05 | Resolved: 2020-06-18

## 2020-06-18 PROBLEM — O30.043 DICHORIONIC DIAMNIOTIC TWIN PREGNANCY IN THIRD TRIMESTER: Status: RESOLVED | Noted: 2018-05-15 | Resolved: 2020-06-18

## 2020-06-18 PROBLEM — O99.341 BIPOLAR DISEASE DURING PREGNANCY IN FIRST TRIMESTER (HCC): Status: RESOLVED | Noted: 2018-04-20 | Resolved: 2020-06-18

## 2020-06-18 PROBLEM — F31.9 BIPOLAR DISEASE DURING PREGNANCY IN FIRST TRIMESTER (HCC): Status: RESOLVED | Noted: 2018-04-20 | Resolved: 2020-06-18

## 2020-06-18 PROBLEM — IMO0001 BIRTH CONTROL: Status: RESOLVED | Noted: 2019-04-03 | Resolved: 2020-06-18

## 2020-06-18 PROBLEM — Z3A.34 34 WEEKS GESTATION OF PREGNANCY: Status: RESOLVED | Noted: 2018-08-24 | Resolved: 2020-06-18

## 2020-06-18 PROCEDURE — 99212 OFFICE O/P EST SF 10 MIN: CPT | Performed by: NURSE PRACTITIONER

## 2020-06-25 ENCOUNTER — TELEPHONE (OUTPATIENT)
Dept: OBGYN CLINIC | Age: 30
End: 2020-06-25

## 2020-07-21 ENCOUNTER — PROCEDURE VISIT (OUTPATIENT)
Dept: OBGYN CLINIC | Facility: CLINIC | Age: 30
End: 2020-07-21
Payer: COMMERCIAL

## 2020-07-21 VITALS
DIASTOLIC BLOOD PRESSURE: 74 MMHG | HEIGHT: 63 IN | SYSTOLIC BLOOD PRESSURE: 116 MMHG | TEMPERATURE: 97.3 F | BODY MASS INDEX: 25.87 KG/M2 | WEIGHT: 146 LBS

## 2020-07-21 DIAGNOSIS — Z30.017 NEXPLANON INSERTION: ICD-10-CM

## 2020-07-21 DIAGNOSIS — Z30.011 ENCOUNTER FOR INITIAL PRESCRIPTION OF CONTRACEPTIVE PILLS: Primary | ICD-10-CM

## 2020-07-21 PROCEDURE — 11981 INSERTION DRUG DLVR IMPLANT: CPT | Performed by: STUDENT IN AN ORGANIZED HEALTH CARE EDUCATION/TRAINING PROGRAM

## 2020-07-21 NOTE — PROGRESS NOTES
Remove and insert drug implant  Date/Time: 7/21/2020 8:32 AM  Performed by: Daniel Spicer MD  Authorized by: Daniel Spicer MD     Consent:     Consent obtained:  Verbal and written    Consent given by:  Patient    Procedural risks discussed:  Bleeding, damage to other organs, failure rate, infection and possible continued pain    Patient questions answered: yes      Patient agrees, verbalizes understanding, and wants to proceed: yes      Educational handouts given: yes      Instructions and paperwork completed: yes    Indication:     Indication: Insertion of non-biodegradable drug delivery implant    Pre-procedure:     Local anesthetic:  Lidocaine without epinephrine    The site was cleaned and prepped in a sterile fashion: yes    Procedure:     Procedure: Insertion    Left/right:  Left    Preloaded contraceptive capsule trocar was placed subdermally: yes      Visualization of implant was obtained: yes      Contraceptive capsule was inserted and trocar removed: yes      Visualization of notch in stylet and palpation of device: yes      Palpation confirms placement by provider and patient: yes      Site was closed with steri-strips and pressure bandage applied: yes    Comments:      Uncomplicated Nexplanon insertion  Patient was unable to/refused to complete UPT time of procedure  She is currently on her period  Discussed risk of early undiagnosed pregnancy and possible delayed diagnosis due to device placement  She accepts these risks and would like to proceed with placement without UPT  Advised 7 day back up contraception

## 2020-07-21 NOTE — PROGRESS NOTES
Wanda Vazquez presents for Nexplanon insertion  She states she is in the middle of her period currently and that it began on Sat 7/18/20

## 2021-03-23 ENCOUNTER — OFFICE VISIT (OUTPATIENT)
Dept: OBGYN CLINIC | Facility: CLINIC | Age: 31
End: 2021-03-23
Payer: COMMERCIAL

## 2021-03-23 VITALS
DIASTOLIC BLOOD PRESSURE: 80 MMHG | WEIGHT: 160.4 LBS | BODY MASS INDEX: 28.42 KG/M2 | SYSTOLIC BLOOD PRESSURE: 120 MMHG | HEIGHT: 63 IN

## 2021-03-23 DIAGNOSIS — Z97.5 NEXPLANON IN PLACE: Primary | ICD-10-CM

## 2021-03-23 PROCEDURE — 99213 OFFICE O/P EST LOW 20 MIN: CPT | Performed by: STUDENT IN AN ORGANIZED HEALTH CARE EDUCATION/TRAINING PROGRAM

## 2021-03-23 NOTE — ASSESSMENT & PLAN NOTE
Irregular bleeding with nexplanon  Reviewed normal finding  Nexplanon palpated and appropriate    Reviewed NSAID, COCP, prog only layering  Would like to try COCP- no CI   Return if symptoms persist

## 2021-03-23 NOTE — PROGRESS NOTES
Assessment/Plan:    Nexplanon in place  Irregular bleeding with nexplanon  Reviewed normal finding  Nexplanon palpated and appropriate    Reviewed NSAID, COCP, prog only layering  Would like to try COCP- no CI  Return if symptoms persist        Diagnoses and all orders for this visit:    Nexplanon in place  -     Norethin-Eth Estrad-Fe Biphas 1 MG-10 MCG / 10 MCG TABS; Take 1 tablet by mouth daily          Subjective:      Patient ID: Fabiano Vincent is a 31 y o  female  32 yo here to discuss symptoms of nexplanon  She had it placed 7/2020  Initially very happy with her bleeding profile  More recently in January and March she had prolonged light cycle of persistent bleeding  No other complaints  The following portions of the patient's history were reviewed and updated as appropriate: allergies, current medications, past family history, past medical history, past social history, past surgical history and problem list     Review of Systems   Constitutional: Negative for chills and fever  Respiratory: Negative for shortness of breath  Cardiovascular: Negative for chest pain  Gastrointestinal: Negative for abdominal pain, constipation, diarrhea and nausea  Genitourinary: Positive for vaginal bleeding  Negative for dyspareunia, dysuria, frequency, pelvic pain, urgency, vaginal discharge and vaginal pain  Objective:      /80 (BP Location: Left arm, Patient Position: Sitting, Cuff Size: Standard)   Ht 5' 3" (1 6 m)   Wt 72 8 kg (160 lb 6 4 oz)   BMI 28 41 kg/m²          Physical Exam  Vitals signs reviewed  Constitutional:       General: She is not in acute distress  Appearance: She is well-developed  She is not diaphoretic  HENT:      Head: Normocephalic and atraumatic  Neck:      Musculoskeletal: Normal range of motion  Pulmonary:      Effort: Pulmonary effort is normal  No respiratory distress     Musculoskeletal:        Arms:    Neurological:      Mental Status: She is alert and oriented to person, place, and time  Psychiatric:         Behavior: Behavior normal          Thought Content:  Thought content normal          Judgment: Judgment normal

## 2021-03-23 NOTE — PROGRESS NOTES
Pt here for irregular periods   LMP start date: 1/30/21 and lasted a month  Then stopped a week, and is now going on week 3  Lots of cramping, some clotting, light flow

## 2021-03-25 ENCOUNTER — TELEPHONE (OUTPATIENT)
Dept: OBGYN CLINIC | Facility: CLINIC | Age: 31
End: 2021-03-25

## 2021-03-25 NOTE — TELEPHONE ENCOUNTER
Pt called, medication sent in by Dr Elizabet Camacho is not covered  Is there anything else that can be sent

## 2021-03-26 ENCOUNTER — TELEPHONE (OUTPATIENT)
Dept: OBGYN CLINIC | Facility: CLINIC | Age: 31
End: 2021-03-26

## 2021-03-26 DIAGNOSIS — Z30.011 ENCOUNTER FOR INITIAL PRESCRIPTION OF CONTRACEPTIVE PILLS: Primary | ICD-10-CM

## 2021-03-26 NOTE — TELEPHONE ENCOUNTER
Pt called in today to see if her ocp lo loestrin FE was covered  I advised pt we received from [facility] its not covered (per media received 03/23-[facility] )   I pt there is a list of  alternative and I will send to the provider  pt agreed as she does not know which on e would help her better   Please advise

## 2021-03-27 RX ORDER — NORETHINDRONE ACETATE AND ETHINYL ESTRADIOL 1MG-20(21)
1 KIT ORAL DAILY
Qty: 28 TABLET | Refills: 11 | Status: SHIPPED | OUTPATIENT
Start: 2021-03-27 | End: 2021-04-13

## 2021-03-29 NOTE — TELEPHONE ENCOUNTER
Pt contacted # [phone] vm- per hippa communication consent on file -lmom advising as directed

## 2021-04-13 ENCOUNTER — OFFICE VISIT (OUTPATIENT)
Dept: OBGYN CLINIC | Facility: CLINIC | Age: 31
End: 2021-04-13
Payer: COMMERCIAL

## 2021-04-13 VITALS — DIASTOLIC BLOOD PRESSURE: 86 MMHG | WEIGHT: 161.2 LBS | SYSTOLIC BLOOD PRESSURE: 138 MMHG | BODY MASS INDEX: 28.56 KG/M2

## 2021-04-13 DIAGNOSIS — Z30.46 NEXPLANON REMOVAL: Primary | ICD-10-CM

## 2021-04-13 PROBLEM — Z97.5 NEXPLANON IN PLACE: Status: RESOLVED | Noted: 2021-03-23 | Resolved: 2021-04-13

## 2021-04-13 PROCEDURE — 11982 REMOVE DRUG IMPLANT DEVICE: CPT | Performed by: CLINICAL NURSE SPECIALIST

## 2021-04-13 NOTE — PROGRESS NOTES
Here for NEXPLANON REMOVAL ONLY  Universal Protocol:  Consent: Verbal consent obtained  Consent given by: patient  Time out: Immediately prior to procedure a "time out" was called to verify the correct patient, procedure, equipment, support staff and site/side marked as required  Patient understanding: patient states understanding of the procedure being performed  Patient consent: the patient's understanding of the procedure matches consent given  Procedure consent: procedure consent matches procedure scheduled  Site marked: the operative site was marked (left arm)  Patient identity confirmed: verbally with patient    Remove and insert drug implant    Date/Time: 4/13/2021 11:11 AM  Performed by: SARTHAK Enciso  Authorized by: SARTHAK Enciso     Indication:     Indication: Presence of non-biodegradable drug delivery implant      Indication comment:  Side effects of nexplanon-persistant bleeding  Pre-procedure:     Pre-procedure timeout performed: yes      Prepped with: povidone-iodine      Local anesthetic:  Lidocaine with epinephrine    The site was cleaned and prepped in a sterile fashion: yes    Procedure:     Procedure:  Removal    Small stab incision was made in arm: yes      Left/right:  Left  Comments:        Date of Insertion:  7/2020  Date of Removal:  April 13, 2021  Procedure:    Implant identified  Left upper arm prepped with Betadinex3   1% lidocaine with epinephrine injected at planned incision site  A vertical incision 2 mm was performed with a #11 scalpel at the distal end of implant  The implant was removed using a pop-out technique  The implant was inspected and found to be intact and complete  Steri strips and a pressure dressing were applied to the site  After removal instructions were given and verbally reviewed with the patient who acknowledged her understanding  Difficulties with the implant removal procedure?   no

## 2021-04-13 NOTE — PATIENT INSTRUCTIONS
Instructions post NEXPLANON Insertion/Removal    · You will likely feel mild discomfort at the site as the local anesthesia wears off  · Please keep site wrapped x 24 hours, then keep clean and dry  · Ok to use bandaid  · Steristrips should fall off on their own within 1-2 weeks  · Use ice/motrin/tylenol for any discomfort    · Please notify us if fever, pain or drainage occurs that is concerning to you

## 2021-05-01 ENCOUNTER — OFFICE VISIT (OUTPATIENT)
Dept: URGENT CARE | Facility: MEDICAL CENTER | Age: 31
End: 2021-05-01
Payer: COMMERCIAL

## 2021-05-01 VITALS
OXYGEN SATURATION: 98 % | DIASTOLIC BLOOD PRESSURE: 88 MMHG | WEIGHT: 157 LBS | HEIGHT: 65 IN | SYSTOLIC BLOOD PRESSURE: 143 MMHG | TEMPERATURE: 98.6 F | RESPIRATION RATE: 18 BRPM | HEART RATE: 103 BPM | BODY MASS INDEX: 26.16 KG/M2

## 2021-05-01 DIAGNOSIS — R39.9 UTI SYMPTOMS: Primary | ICD-10-CM

## 2021-05-01 LAB
SL AMB  POCT GLUCOSE, UA: NORMAL
SL AMB LEUKOCYTE ESTERASE,UA: NORMAL
SL AMB POCT BILIRUBIN,UA: NORMAL
SL AMB POCT BLOOD,UA: NORMAL
SL AMB POCT CLARITY,UA: NORMAL
SL AMB POCT COLOR,UA: YELLOW
SL AMB POCT KETONES,UA: NORMAL
SL AMB POCT NITRITE,UA: NORMAL
SL AMB POCT PH,UA: 5
SL AMB POCT SPECIFIC GRAVITY,UA: 1
SL AMB POCT URINE PROTEIN: NORMAL
SL AMB POCT UROBILINOGEN: 0.2

## 2021-05-01 PROCEDURE — 99213 OFFICE O/P EST LOW 20 MIN: CPT | Performed by: PHYSICIAN ASSISTANT

## 2021-05-01 PROCEDURE — 81002 URINALYSIS NONAUTO W/O SCOPE: CPT | Performed by: PHYSICIAN ASSISTANT

## 2021-05-01 PROCEDURE — S9088 SERVICES PROVIDED IN URGENT: HCPCS | Performed by: PHYSICIAN ASSISTANT

## 2021-05-01 NOTE — PROGRESS NOTES
[facility]        NAME: Ben Ford is a 31 y o  female  : 1990    MRN: [id number]  DATE: May 1, 2021  TIME: 3:28 PM    Assessment and Plan   UTI symptoms [R39 9]  1  UTI symptoms  POCT urine dip    Ambulatory referral to Urology         Patient Instructions     1  Push fluids  2  Motrin as needed for discomfort  3  Recommend consult with Urology  4  Proceed to  ER if symptoms worsen  Chief Complaint     Chief Complaint   Patient presents with    Possible UTI     frequency, burning and pressure upon urination          History of Present Illness       Lisa Jimenes is a 18-year-old female presents with a 2 day history of increasing urinary frequency, burning and pain with urination  Patient reports a recurrent history of chronic urinary tract infections  She denies any current fever, abdominal pain, back pain or visible blood in her urine  Review of Systems   Review of Systems   Constitutional: Negative  HENT: Negative  Respiratory: Negative  Gastrointestinal: Negative  Genitourinary: Positive for dysuria, frequency and urgency  Negative for hematuria           Current Medications       Current Outpatient Medications:     albuterol (PROVENTIL HFA,VENTOLIN HFA) 90 mcg/act inhaler, Two puffs every four hours as needed for wheezing, Disp: , Rfl:     cetirizine (ZyrTEC) 10 mg tablet, Take 10 mg by mouth, Disp: , Rfl:     fluticasone (FLONASE) 50 mcg/act nasal spray, 2 sprays into each nostril, Disp: , Rfl:     hydrOXYzine HCL (ATARAX) 10 mg tablet, Take 10 mg by mouth, Disp: , Rfl:     meloxicam (MOBIC) 15 mg tablet, Daily, Disp: , Rfl:     montelukast (SINGULAIR) 10 mg tablet, Take 10 mg by mouth, Disp: , Rfl:     polyethylene glycol (MIRALAX) powder, Take 17 g by mouth, Disp: , Rfl:     Prenatal Vit-Fe Fumarate-FA (PNV PRENATAL PLUS MULTIVITAMIN) 27-1 MG TABS, Take by mouth, Disp: , Rfl:     Current Allergies     Allergies as of 2021 - Reviewed 2021   Allergen Reaction Noted    Poison ivy extract Rash 2015            The following portions of the patient's history were reviewed and updated as appropriate: allergies, current medications, past family history, past medical history, past social history, past surgical history and problem list      Past Medical History:   Diagnosis Date    Depression     Hypertension     Varicella        Past Surgical History:   Procedure Laterality Date     SECTION      MYRINGOTOMY W/ TUBES      [place]  DELIVERY ONLY N/A 2018    Procedure:  SECTION (); Surgeon: Dany Flaherty MD;  Location: [facility];  Service: Obstetrics       Family History   Problem Relation Age of Onset    Heart disease Father     Hypertension Father     Spina bifida Cousin     No Known Problems Mother     No Known Problems Brother     No Known Problems Daughter     No Known Problems Son     Breast cancer Neg Hx     Colon cancer Neg Hx     Ovarian cancer Neg Hx     Cervical cancer Neg Hx     Uterine cancer Neg Hx          Medications have been verified  Objective   /88   Pulse 103   Temp 98 6 °F (37 °C)   Resp 18   Ht 5' 5" (1 651 m)   Wt 71 2 kg (157 lb)   SpO2 98%   BMI 26 13 kg/m²   No LMP recorded  Physical Exam     Physical Exam  Constitutional:       General: She is not in acute distress  Appearance: Normal appearance  Cardiovascular:      Rate and Rhythm: Normal rate and regular rhythm  Heart sounds: Normal heart sounds  No murmur  Pulmonary:      Effort: Pulmonary effort is normal       Breath sounds: Normal breath sounds  Abdominal:      General: Abdomen is flat  Bowel sounds are normal       Palpations: Abdomen is soft  Tenderness: There is abdominal tenderness in the suprapubic area  There is no right CVA tenderness, left CVA tenderness, guarding or rebound  Negative signs include Bill's sign  Neurological:      Mental Status: She is alert

## 2021-05-01 NOTE — PATIENT INSTRUCTIONS
1  Push fluids  2  Motrin as needed for discomfort  3  Recommend consult with Urology  4  Proceed to  ER if symptoms worsen

## 2021-06-30 ENCOUNTER — OFFICE VISIT (OUTPATIENT)
Dept: URGENT CARE | Facility: MEDICAL CENTER | Age: 31
End: 2021-06-30
Payer: COMMERCIAL

## 2021-06-30 VITALS
RESPIRATION RATE: 20 BRPM | OXYGEN SATURATION: 97 % | SYSTOLIC BLOOD PRESSURE: 143 MMHG | TEMPERATURE: 98.2 F | DIASTOLIC BLOOD PRESSURE: 92 MMHG | HEART RATE: 88 BPM

## 2021-06-30 DIAGNOSIS — R82.90 URINE ABNORMALITY: ICD-10-CM

## 2021-06-30 DIAGNOSIS — N76.0 ACUTE VAGINITIS: Primary | ICD-10-CM

## 2021-06-30 LAB
SL AMB  POCT GLUCOSE, UA: NORMAL
SL AMB LEUKOCYTE ESTERASE,UA: NORMAL
SL AMB POCT BILIRUBIN,UA: NORMAL
SL AMB POCT BLOOD,UA: NORMAL
SL AMB POCT CLARITY,UA: NORMAL
SL AMB POCT COLOR,UA: NORMAL
SL AMB POCT KETONES,UA: NORMAL
SL AMB POCT NITRITE,UA: NORMAL
SL AMB POCT PH,UA: 6.5
SL AMB POCT SPECIFIC GRAVITY,UA: 1.03
SL AMB POCT URINE PROTEIN: 30
SL AMB POCT UROBILINOGEN: 0.2

## 2021-06-30 PROCEDURE — S9088 SERVICES PROVIDED IN URGENT: HCPCS | Performed by: PHYSICIAN ASSISTANT

## 2021-06-30 PROCEDURE — 99213 OFFICE O/P EST LOW 20 MIN: CPT | Performed by: PHYSICIAN ASSISTANT

## 2021-06-30 PROCEDURE — 81002 URINALYSIS NONAUTO W/O SCOPE: CPT

## 2021-06-30 RX ORDER — FLUCONAZOLE 150 MG/1
150 TABLET ORAL ONCE
Qty: 1 TABLET | Refills: 0 | Status: SHIPPED | OUTPATIENT
Start: 2021-06-30 | End: 2021-06-30

## 2021-06-30 NOTE — PROGRESS NOTES
[facility]        NAME: Maribell Roberson is a 31 y o  female  : 1990    MRN: [id number]  DATE: 2021  TIME: 1:56 PM    Assessment and Plan   Acute vaginitis [N76 0]  1  Acute vaginitis     2  Urine abnormality  POCT urine dip         Patient Instructions     Vaginitis  Fluconazole once  Follow up with PCP in 3-5 days  Proceed to  ER if symptoms worsen  Chief Complaint     Chief Complaint   Patient presents with    Vaginitis     took OTC med for yeast infection   pt had to inject into vaginal area  itchy         History of Present Illness       30 y/o female presents c/o vaginal itching  Patient denies urinary urgency, dysuria  States she has had yeast infections in the past with similar symptoms      Review of Systems   Review of Systems   Constitutional: Negative  HENT: Negative  Eyes: Negative  Respiratory: Negative  Negative for cough, chest tightness, shortness of breath, wheezing and stridor  Cardiovascular: Negative  Negative for chest pain, palpitations and leg swelling  Skin: Positive for rash           Current Medications       Current Outpatient Medications:     albuterol (PROVENTIL HFA,VENTOLIN HFA) 90 mcg/act inhaler, Two puffs every four hours as needed for wheezing, Disp: , Rfl:     cetirizine (ZyrTEC) 10 mg tablet, Take 10 mg by mouth, Disp: , Rfl:     fluticasone (FLONASE) 50 mcg/act nasal spray, 2 sprays into each nostril, Disp: , Rfl:     hydrOXYzine HCL (ATARAX) 10 mg tablet, Take 10 mg by mouth, Disp: , Rfl:     meloxicam (MOBIC) 15 mg tablet, Daily, Disp: , Rfl:     montelukast (SINGULAIR) 10 mg tablet, Take 10 mg by mouth, Disp: , Rfl:     polyethylene glycol (MIRALAX) powder, Take 17 g by mouth, Disp: , Rfl:     Prenatal Vit-Fe Fumarate-FA (PNV PRENATAL PLUS MULTIVITAMIN) 27-1 MG TABS, Take by mouth, Disp: , Rfl:     Current Allergies     Allergies as of 2021 - Reviewed 2021   Allergen Reaction Noted    Poison ivy extract Rash 2015            The following portions of the patient's history were reviewed and updated as appropriate: allergies, current medications, past family history, past medical history, past social history, past surgical history and problem list      Past Medical History:   Diagnosis Date    Depression     Hypertension     Varicella        Past Surgical History:   Procedure Laterality Date     SECTION      MYRINGOTOMY W/ TUBES      [place]  DELIVERY ONLY N/A 2018    Procedure:  SECTION (); Surgeon: Misha Ontiveros MD;  Location: [facility];  Service: Obstetrics       Family History   Problem Relation Age of Onset    Heart disease Father     Hypertension Father     Spina bifida Cousin     No Known Problems Mother     No Known Problems Brother     No Known Problems Daughter     No Known Problems Son     Breast cancer Neg Hx     Colon cancer Neg Hx     Ovarian cancer Neg Hx     Cervical cancer Neg Hx     Uterine cancer Neg Hx          Medications have been verified  Objective   /92   Pulse 88   Temp 98 2 °F (36 8 °C)   Resp 20   SpO2 97%        Physical Exam     Physical Exam  Constitutional:       Appearance: She is well-developed and normal weight  HENT:      Head: Normocephalic and atraumatic  Right Ear: External ear normal       Left Ear: External ear normal       Nose: Nose normal       Mouth/Throat:      Pharynx: No oropharyngeal exudate  Cardiovascular:      Rate and Rhythm: Normal rate and regular rhythm  Heart sounds: Normal heart sounds  Pulmonary:      Effort: Pulmonary effort is normal  No respiratory distress  Breath sounds: Normal breath sounds  No wheezing or rales  Chest:      Chest wall: No tenderness  Musculoskeletal:      Cervical back: Normal range of motion and neck supple  Lymphadenopathy:      Cervical: No cervical adenopathy  Neurological:      Mental Status: She is alert       pelvic exam deferred

## 2021-06-30 NOTE — PATIENT INSTRUCTIONS
Vaginitis  Fluconazole once  Follow up with PCP in 3-5 days  Proceed to  ER if symptoms worsen  Vaginitis   WHAT YOU NEED TO KNOW:   Vaginitis is an inflammation or infection of your vagina  The most common causes are bacteria, a virus, or fungus  Chemicals in bubble baths, soaps, or perfumes can also cause vaginitis  An infection can spread during sexual activity  You may also develop vaginitis from a foreign object in your vagina, such as rolled up toilet paper left over from wiping  DISCHARGE INSTRUCTIONS:   Return to the emergency department if:   · You have unusual vaginal bleeding  · You have severe abdominal pain  Contact your healthcare provider if:   · You have a fever  · You have abdominal pain  · Your symptoms get worse, even after treatment  · Your symptoms return  · You have questions or concerns about your condition or care  Medicines: You may  need any of the following:  · Antifungals  are used to treat a fungal infection  They may be given as a cream, gel, or tablet you insert into your vagina  · Antibiotics  are used to fight an infection caused by bacteria  · Take your medicine as directed  Contact your healthcare provider if you think your medicine is not helping or if you have side effects  Tell him of her if you are allergic to any medicine  Keep a list of the medicines, vitamins, and herbs you take  Include the amounts, and when and why you take them  Bring the list or the pill bottles to follow-up visits  Carry your medicine list with you in case of an emergency  Manage vaginitis:   · Use a sitz bath to ease your symptoms  A sitz bath is a portable tub that fits into the toilet basin  You can also soak in a bathtub that has 4 to 6 inches of warm water  Stay in the sitz bath or tub for 15 to 20 minutes  Ask your healthcare provider how often to do this  · Do not use douches other irritating products in your vagina    Examples include bubble baths and perfumed soaps  The vagina is delicate and easily irritated  Ask your healthcare provider if it is okay to use tampons during your monthly periods  You may need to use pads instead until your symptoms go away  · Do not wear tight-fitting clothes or undergarments  These can make your symptoms worse  · Do not have sex until your symptoms go away  When you have sex, always use a condom  Condoms can help protect you from contact with fluids from your partner that may be causing your vaginitis  Prevent vaginitis:   · Wash your vagina each day  Use mild soap and warm water  Gently dry the area or let it air dry after you wash  · Wipe from front to back  Do this after you urinate or have a bowel movement  This will prevent germs from getting into your vagina  Follow up with your healthcare provider as directed:  Write down your questions so you remember to ask them during your visits  © Copyright [employer] 2018 Information is for End User's use only and may not be sold, redistributed or otherwise used for commercial purposes  All illustrations and images included in CareNotes® are the copyrighted property of [employer]  or [facility] Jose Zhou   The above information is an  only  It is not intended as medical advice for individual conditions or treatments  Talk to your doctor, nurse or pharmacist before following any medical regimen to see if it is safe and effective for you

## 2021-07-02 ENCOUNTER — OFFICE VISIT (OUTPATIENT)
Dept: URGENT CARE | Facility: MEDICAL CENTER | Age: 31
End: 2021-07-02
Payer: COMMERCIAL

## 2021-07-02 VITALS
HEIGHT: 63 IN | BODY MASS INDEX: 26.93 KG/M2 | WEIGHT: 152 LBS | TEMPERATURE: 98.1 F | SYSTOLIC BLOOD PRESSURE: 142 MMHG | DIASTOLIC BLOOD PRESSURE: 92 MMHG | OXYGEN SATURATION: 98 % | RESPIRATION RATE: 18 BRPM | HEART RATE: 86 BPM

## 2021-07-02 DIAGNOSIS — N76.0 ACUTE VAGINITIS: Primary | ICD-10-CM

## 2021-07-02 PROCEDURE — S9088 SERVICES PROVIDED IN URGENT: HCPCS | Performed by: PHYSICIAN ASSISTANT

## 2021-07-02 PROCEDURE — 99213 OFFICE O/P EST LOW 20 MIN: CPT | Performed by: PHYSICIAN ASSISTANT

## 2021-07-02 RX ORDER — FLUCONAZOLE 150 MG/1
150 TABLET ORAL ONCE
Qty: 1 TABLET | Refills: 0 | Status: SHIPPED | OUTPATIENT
Start: 2021-07-02 | End: 2021-07-02

## 2021-07-03 NOTE — PROGRESS NOTES
[facility]        NAME: Reyna Lagunas is a 31 y o  female  : 1990    MRN: [id number]  DATE: 2021  TIME: 10:00 PM    Assessment and Plan   Acute vaginitis [N76 0]  1  Acute vaginitis  fluconazole (DIFLUCAN) 150 mg tablet         Patient Instructions     Vaginitis  Fluconazole 150 x 1  Follow up with PCP in 3-5 days  Proceed to  ER if symptoms worsen  Chief Complaint     Chief Complaint   Patient presents with    vaginal itching         History of Present Illness       30 y/o female presents c/o vaginal itching, states she has had similar episodes with yeast infections  Denies abdominal pain, urinary symptoms, fever, chills      Review of Systems   Review of Systems   Constitutional: Negative  HENT: Negative  Eyes: Negative  Respiratory: Negative  Negative for cough, chest tightness, shortness of breath, wheezing and stridor  Cardiovascular: Negative  Negative for chest pain, palpitations and leg swelling  Genitourinary: Negative for dysuria, flank pain, frequency, pelvic pain and urgency           Current Medications       Current Outpatient Medications:     albuterol (PROVENTIL HFA,VENTOLIN HFA) 90 mcg/act inhaler, Two puffs every four hours as needed for wheezing, Disp: , Rfl:     cetirizine (ZyrTEC) 10 mg tablet, Take 10 mg by mouth, Disp: , Rfl:     fluticasone (FLONASE) 50 mcg/act nasal spray, 2 sprays into each nostril, Disp: , Rfl:     hydrOXYzine HCL (ATARAX) 10 mg tablet, Take 10 mg by mouth, Disp: , Rfl:     meloxicam (MOBIC) 15 mg tablet, Daily, Disp: , Rfl:     montelukast (SINGULAIR) 10 mg tablet, Take 10 mg by mouth, Disp: , Rfl:     polyethylene glycol (MIRALAX) powder, Take 17 g by mouth, Disp: , Rfl:     Prenatal Vit-Fe Fumarate-FA (PNV PRENATAL PLUS MULTIVITAMIN) 27-1 MG TABS, Take by mouth, Disp: , Rfl:     fluconazole (DIFLUCAN) 150 mg tablet, Take 1 tablet (150 mg total) by mouth once for 1 dose, Disp: 1 tablet, Rfl: 0    Current Allergies Allergies as of 2021 - Reviewed 2021   Allergen Reaction Noted    Poison ivy extract Rash 2015            The following portions of the patient's history were reviewed and updated as appropriate: allergies, current medications, past family history, past medical history, past social history, past surgical history and problem list      Past Medical History:   Diagnosis Date    Depression     Hypertension     Varicella        Past Surgical History:   Procedure Laterality Date     SECTION      MYRINGOTOMY W/ TUBES      [place]  DELIVERY ONLY N/A 2018    Procedure:  SECTION (); Surgeon: Ba Doherty MD;  Location[facility];  Service: Obstetrics       Family History   Problem Relation Age of Onset    Heart disease Father     Hypertension Father     Spina bifida Cousin     No Known Problems Mother     No Known Problems Brother     No Known Problems Daughter     No Known Problems Son     Breast cancer Neg Hx     Colon cancer Neg Hx     Ovarian cancer Neg Hx     Cervical cancer Neg Hx     Uterine cancer Neg Hx          Medications have been verified  Objective   /92   Pulse 86   Temp 98 1 °F (36 7 °C)   Resp 18   Ht 5' 3" (1 6 m)   Wt 68 9 kg (152 lb)   SpO2 98%   BMI 26 93 kg/m²        Physical Exam     Physical Exam  Constitutional:       Appearance: She is well-developed  HENT:      Head: Normocephalic and atraumatic  Right Ear: External ear normal       Left Ear: External ear normal       Nose: Nose normal       Mouth/Throat:      Pharynx: No oropharyngeal exudate  Cardiovascular:      Rate and Rhythm: Normal rate and regular rhythm  Heart sounds: Normal heart sounds  Pulmonary:      Effort: Pulmonary effort is normal  No respiratory distress  Breath sounds: Normal breath sounds  No wheezing or rales  Chest:      Chest wall: No tenderness  Abdominal:      General: Abdomen is flat   Bowel sounds are normal  There is no distension  Palpations: Abdomen is soft  There is no mass  Tenderness: There is no abdominal tenderness  There is no right CVA tenderness, left CVA tenderness, guarding or rebound  Musculoskeletal:      Cervical back: Normal range of motion and neck supple  Lymphadenopathy:      Cervical: No cervical adenopathy

## 2021-07-03 NOTE — PATIENT INSTRUCTIONS
Vaginitis  Fluconazole 150 x 1  Follow up with PCP in 3-5 days  Proceed to  ER if symptoms worsen  Oral Candidiasis   WHAT YOU NEED TO KNOW:   Oral candidiasis, or thrush, is a fungal infection that affects the inside of your mouth  DISCHARGE INSTRUCTIONS:   Return to the emergency department if:   · You have trouble swallowing and your jaw and neck are stiff  · You are dizzy, thirsty, or have a dry mouth  · You are urinating little or not at all  · You cannot eat or drink because of the pain  Contact your healthcare provider if:   · You have a fever  · You have nausea, vomiting, or diarrhea  · Your signs and symptoms get worse, even after treatment  · You have questions or concerns about your condition or care  Medicines:   · Antifungal medicine  helps kill the fungus that caused your oral candidiasis  This medicine may be a pill or a solution that you gargle  Remove dentures before you gargle  · Take your medicine as directed  Contact your healthcare provider if you think your medicine is not helping or if you have side effects  Tell him of her if you are allergic to any medicine  Keep a list of the medicines, vitamins, and herbs you take  Include the amounts, and when and why you take them  Bring the list or the pill bottles to follow-up visits  Carry your medicine list with you in case of an emergency  Prevent oral candidiasis:  Brush your teeth, gums, and tongue after you eat and before you go to sleep  Use a toothbrush with soft bristles  See your dentist for regular exams  Remove your dentures when you sleep, or at least 6 hours each day  Clean your dentures and soak them in denture   Let them air dry after soaking  Follow up with your healthcare provider as directed:  Write down your questions so you remember to ask them during your visits     © Copyright [address] Information is for End User's use only and may not be sold, redistributed or otherwise used for commercial purposes  All illustrations and images included in CareNotes® are the copyrighted property of [employer] Inc  or Rudi Miller  The above information is an  only  It is not intended as medical advice for individual conditions or treatments  Talk to your doctor, nurse or pharmacist before following any medical regimen to see if it is safe and effective for you

## 2022-01-11 ENCOUNTER — OFFICE VISIT (OUTPATIENT)
Dept: URGENT CARE | Facility: MEDICAL CENTER | Age: 32
End: 2022-01-11
Payer: COMMERCIAL

## 2022-01-11 VITALS
RESPIRATION RATE: 18 BRPM | HEART RATE: 88 BPM | BODY MASS INDEX: 25.69 KG/M2 | TEMPERATURE: 97.8 F | WEIGHT: 145 LBS | OXYGEN SATURATION: 100 % | HEIGHT: 63 IN

## 2022-01-11 DIAGNOSIS — J06.9 ACUTE URI: ICD-10-CM

## 2022-01-11 DIAGNOSIS — Z20.822 EXPOSURE TO COVID-19 VIRUS: Primary | ICD-10-CM

## 2022-01-11 PROCEDURE — U0005 INFEC AGEN DETEC AMPLI PROBE: HCPCS | Performed by: PHYSICIAN ASSISTANT

## 2022-01-11 PROCEDURE — U0003 INFECTIOUS AGENT DETECTION BY NUCLEIC ACID (DNA OR RNA); SEVERE ACUTE RESPIRATORY SYNDROME CORONAVIRUS 2 (SARS-COV-2) (CORONAVIRUS DISEASE [COVID-19]), AMPLIFIED PROBE TECHNIQUE, MAKING USE OF HIGH THROUGHPUT TECHNOLOGIES AS DESCRIBED BY CMS-2020-01-R: HCPCS | Performed by: PHYSICIAN ASSISTANT

## 2022-01-11 PROCEDURE — S9088 SERVICES PROVIDED IN URGENT: HCPCS | Performed by: PHYSICIAN ASSISTANT

## 2022-01-11 PROCEDURE — 99213 OFFICE O/P EST LOW 20 MIN: CPT | Performed by: PHYSICIAN ASSISTANT

## 2022-01-11 NOTE — LETTER
[facility] [address] [id number]  Dept: [phone]    January 11, 2022    Patient: Anh Bragg  YOB: 1990    Anh Bragg was seen and evaluated at our [facility]  Please note if Covid and Flu tests are negative, they may return to work when fever free for 24 hours without the use of a fever reducing agent  If Covid or Flu test is positive, they may return to work on 1/14/2022, as this is 5 days from the onset of symptoms  Upon return, they must then adhere to strict masking for an additional 5 days      Sincerely,    Heather Nichols PA-C

## 2022-01-11 NOTE — PATIENT INSTRUCTIONS
1  Afrin nasal spray or Sudafed over-the-counter for congestion  2  Ibuprofen 600 mg every 6 hours or acetaminophen 1 g every 4 hours as needed for headache fever body pain  3  Consider sinus rinses  4  Plain Robitussin or Mucinex as needed for phlegm elimination  5  Increase oral fluid intake  6  Quarantine pending COVID-19 test results

## 2022-01-11 NOTE — PROGRESS NOTES
[employer] [facility]        NAME: Mckenzie Esquivel is a 27 y o  female  : 1990    MRN: [id number]  DATE: 2022  TIME: 6:29 PM    Assessment and Plan   Exposure to COVID-19 virus [Z20 822]  1  Exposure to COVID-19 virus     2  Acute URI           Patient Instructions       Follow up with PCP in 3-5 days  Proceed to  ER if symptoms worsen  Chief Complaint     Chief Complaint   Patient presents with    Nasal Congestion     started Saturday, not vaccinated         History of Present Illness       79-year-old female with a 3 day history of nasal congestion she denies cough she denies fever she denies sore throat she denies any other symptoms  Has been recently exposed to her boyfriend who has a sinus infection  Review of Systems   Review of Systems   Constitutional: Negative for fever  HENT: Positive for congestion and rhinorrhea  Negative for facial swelling, sinus pain and sore throat  Respiratory: Negative for cough  Cardiovascular: Negative for chest pain  Gastrointestinal: Negative for abdominal pain  Musculoskeletal: Negative for myalgias  Skin: Negative for rash           Current Medications       Current Outpatient Medications:     albuterol (PROVENTIL HFA,VENTOLIN HFA) 90 mcg/act inhaler, Two puffs every four hours as needed for wheezing, Disp: , Rfl:     cetirizine (ZyrTEC) 10 mg tablet, Take 10 mg by mouth, Disp: , Rfl:     fluticasone (FLONASE) 50 mcg/act nasal spray, 2 sprays into each nostril, Disp: , Rfl:     hydrOXYzine HCL (ATARAX) 10 mg tablet, Take 10 mg by mouth, Disp: , Rfl:     meloxicam (MOBIC) 15 mg tablet, Daily, Disp: , Rfl:     montelukast (SINGULAIR) 10 mg tablet, Take 10 mg by mouth, Disp: , Rfl:     polyethylene glycol (MIRALAX) powder, Take 17 g by mouth, Disp: , Rfl:     Prenatal Vit-Fe Fumarate-FA (PNV PRENATAL PLUS MULTIVITAMIN) 27-1 MG TABS, Take by mouth, Disp: , Rfl:     Current Allergies     Allergies as of 2022 - Reviewed 2022   Allergen Reaction Noted    Poison ivy extract Rash 2015            The following portions of the patient's history were reviewed and updated as appropriate: allergies, current medications, past family history, past medical history, past social history, past surgical history and problem list      Past Medical History:   Diagnosis Date    Depression     Hypertension     Varicella        Past Surgical History:   Procedure Laterality Date     SECTION      MYRINGOTOMY W/ TUBES      [place]  DELIVERY ONLY N/A 2018    Procedure:  SECTION (); Surgeon: Aj Shelby MD;  Location: [facility];  Service: Obstetrics       Family History   Problem Relation Age of Onset    Heart disease Father     Hypertension Father     Spina bifida Cousin     No Known Problems Mother     No Known Problems Brother     No Known Problems Daughter     No Known Problems Son     Breast cancer Neg Hx     Colon cancer Neg Hx     Ovarian cancer Neg Hx     Cervical cancer Neg Hx     Uterine cancer Neg Hx          Medications have been verified  Objective   Pulse 88   Temp 97 8 °F (36 6 °C)   Resp 18   Ht 5' 3" (1 6 m)   Wt 65 8 kg (145 lb)   SpO2 100%   BMI 25 69 kg/m²        Physical Exam     Physical Exam  Vitals and nursing note reviewed  Constitutional:       General: She is not in acute distress  Appearance: Normal appearance  She is not ill-appearing or toxic-appearing  HENT:      Head: Normocephalic  Right Ear: Tympanic membrane normal       Left Ear: Tympanic membrane normal       Nose: Congestion present  Mouth/Throat:      Mouth: Mucous membranes are moist    Eyes:      Conjunctiva/sclera: Conjunctivae normal       Pupils: Pupils are equal, round, and reactive to light  Cardiovascular:      Rate and Rhythm: Normal rate and regular rhythm  Pulses: Normal pulses     Pulmonary:      Effort: Pulmonary effort is normal       Breath sounds: Normal breath sounds  Musculoskeletal:      Cervical back: Normal range of motion  Skin:     General: Skin is warm and dry  Neurological:      Mental Status: She is alert     Psychiatric:         Mood and Affect: Mood normal          Behavior: Behavior normal

## 2022-01-13 LAB — SARS-COV-2 RNA RESP QL NAA+PROBE: NEGATIVE

## 2022-03-25 NOTE — TELEPHONE ENCOUNTER
I have reviewed discharge instructions with the patient. The patient verbalized understanding. Patient left ED via Discharge Method: ambulatory to Home with self. Opportunity for questions and clarification provided. Patient given 2 scripts. To continue your aftercare when you leave the hospital, you may receive an automated call from our care team to check in on how you are doing. This is a free service and part of our promise to provide the best care and service to meet your aftercare needs.  If you have questions, or wish to unsubscribe from this service please call [phone]. Thank you for Choosing our [facility] Emergency Department. Pt called in regards to a script for blood work pt states she spoke with  today and is too late to get genetic test done  requested a clot screen pt explained  was suppose to call us   [facility] please advise

## 2022-06-06 ENCOUNTER — OFFICE VISIT (OUTPATIENT)
Dept: OBGYN CLINIC | Facility: CLINIC | Age: 32
End: 2022-06-06
Payer: COMMERCIAL

## 2022-06-06 VITALS — WEIGHT: 152 LBS | DIASTOLIC BLOOD PRESSURE: 76 MMHG | BODY MASS INDEX: 26.93 KG/M2 | SYSTOLIC BLOOD PRESSURE: 122 MMHG

## 2022-06-06 DIAGNOSIS — I10 PRIMARY HYPERTENSION: ICD-10-CM

## 2022-06-06 DIAGNOSIS — Z30.09 ENCOUNTER FOR COUNSELING REGARDING CONTRACEPTION: Primary | ICD-10-CM

## 2022-06-06 PROBLEM — O10.913 CHRONIC HYPERTENSION IN OBSTETRIC CONTEXT IN THIRD TRIMESTER: Status: RESOLVED | Noted: 2018-08-22 | Resolved: 2022-06-06

## 2022-06-06 PROBLEM — O09.899 RUBELLA NON-IMMUNE STATUS, ANTEPARTUM: Status: RESOLVED | Noted: 2018-04-17 | Resolved: 2022-06-06

## 2022-06-06 PROBLEM — Z28.39 RUBELLA NON-IMMUNE STATUS, ANTEPARTUM: Status: RESOLVED | Noted: 2018-04-17 | Resolved: 2022-06-06

## 2022-06-06 PROCEDURE — 99213 OFFICE O/P EST LOW 20 MIN: CPT | Performed by: CLINICAL NURSE SPECIALIST

## 2022-06-06 RX ORDER — ACETAMINOPHEN AND CODEINE PHOSPHATE 120; 12 MG/5ML; MG/5ML
1 SOLUTION ORAL DAILY
Qty: 30 TABLET | Refills: 3 | Status: SHIPPED | OUTPATIENT
Start: 2022-06-06 | End: 2022-06-24 | Stop reason: ALTCHOICE

## 2022-06-06 RX ORDER — HYDROXYZINE PAMOATE 25 MG/1
CAPSULE ORAL
COMMUNITY
Start: 2022-05-26

## 2022-06-06 RX ORDER — BUPROPION HYDROCHLORIDE 300 MG/1
300 TABLET ORAL DAILY
COMMUNITY
Start: 2022-04-11

## 2022-06-06 NOTE — PROGRESS NOTES
Assessment/Plan:       1  Encounter for counseling regarding contraception  Assessment & Plan:  Contraceptive options were reviewed, including hormonal methods, both combination (pill, patch, vaginal ring) and progesterone-only (pill, Depo Provera and Nexplanon), intrauterine devices (Mirena/Kyleena/Verona and Paragard), barrier methods (condoms, diaphragm) and male/female sterilization  The patient is not a candidate for estrogen containing hormones-due to HTN dx  The mechanisms, risks, benefits and side effects of all methods were discussed  All questions have been answered to her satisfaction  She would like to start on Progesterone only pill  Advised there is no placebo week like a traditional pill, same dose every day  Needs to be taken at the same time daily  May or many not get menses  BTB nml in first 2-3 months  Orders:  -     norethindrone (Ct) 0 35 MG tablet; Take 1 tablet (0 35 mg total) by mouth daily    2  Primary hypertension          Subjective:      Patient ID: Kannan Coon is a 27 y o  female  She is here for Contraception (Patient would like patch  LMP- 6/3/22)    HPI  Pt desires to restart contraception  In particular is interested in patch  Used before and only complication was adherence- was placing on leg  But wasn't aware she could use other sites  hx of depo provera- flat affect  Nexplanon- prolonged irregular bleeding  IUD-has not used but does not want     Is not a smoker  No personal FH VTE  Does have a dx of HTN  Not taking meds  But continues to have interment elevated BP's  nml today  Menstrual History:  No LMP recorded            The following portions of the patient's history were reviewed and updated as appropriate: allergies, current medications, past family history, past medical history, past social history, past surgical history, and problem list     Review of Systems  See HPI for pertinent positives          Objective:    /76 (BP Location: Left arm, Patient Position: Sitting, Cuff Size: Standard)   Wt 68 9 kg (152 lb)   BMI 26 93 kg/m²      Physical Exam  Constitutional:       General: She is not in acute distress  Appearance: Normal appearance  Genitourinary:      Genitourinary Comments: Pelvic exam deferred     Cardiovascular:      Rate and Rhythm: Normal rate  Pulmonary:      Effort: Pulmonary effort is normal    Abdominal:      General: There is no distension  Palpations: Abdomen is soft  Musculoskeletal:         General: Normal range of motion  Neurological:      Mental Status: She is alert and oriented to person, place, and time  Skin:     General: Skin is warm and dry     Psychiatric:         Mood and Affect: Mood normal          Behavior: Behavior normal

## 2022-06-06 NOTE — ASSESSMENT & PLAN NOTE
Contraceptive options were reviewed, including hormonal methods, both combination (pill, patch, vaginal ring) and progesterone-only (pill, Depo Provera and Nexplanon), intrauterine devices (Mirena/Kyleena/Verona and Paragard), barrier methods (condoms, diaphragm) and male/female sterilization  The patient is not a candidate for estrogen containing hormones-due to HTN dx  The mechanisms, risks, benefits and side effects of all methods were discussed  All questions have been answered to her satisfaction  She would like to start on Progesterone only pill  Advised there is no placebo week like a traditional pill, same dose every day  Needs to be taken at the same time daily  May or many not get menses  BTB nml in first 2-3 months

## 2022-06-13 ENCOUNTER — TELEPHONE (OUTPATIENT)
Dept: OBGYN CLINIC | Facility: CLINIC | Age: 32
End: 2022-06-13

## 2022-06-13 NOTE — TELEPHONE ENCOUNTER
Pt said she is taking her pill at same time every day, but after she takes the pill she bleeds, I advised it sometimes takes a month or two to regulate but she wants another pill

## 2022-06-13 NOTE — TELEPHONE ENCOUNTER
Pt had seen Lincoln Jon on 6/6 and tried a new BC  She doesn't like it,it's making her bleed  Has only been on it for 1 week  Would like to change it  Please advise,thank you

## 2022-06-14 NOTE — TELEPHONE ENCOUNTER
Pt calling again for info about birth control  She said Dr Shae Wilson ordered her a [facility] patch and she wants to know why she can't have it again    I advised I can send a message and inquire  After ending the call and looking into her medications to try to see which patch she was wanting - I found it was actually Tally  who ordered this for the pt - norelgestromin-eth estradiol - in 2019       Please advise

## 2022-06-14 NOTE — TELEPHONE ENCOUNTER
Pt said she is anemic, and doesn't want to keep bleeding, I advised if she is bleeding more then a pad every hr she will need to go to the ED   Pt advised to give it one more week, since she cant be on estrogen

## 2022-06-20 ENCOUNTER — OFFICE VISIT (OUTPATIENT)
Dept: OBGYN CLINIC | Facility: MEDICAL CENTER | Age: 32
End: 2022-06-20
Payer: COMMERCIAL

## 2022-06-20 ENCOUNTER — APPOINTMENT (OUTPATIENT)
Dept: LAB | Facility: MEDICAL CENTER | Age: 32
End: 2022-06-20
Payer: COMMERCIAL

## 2022-06-20 ENCOUNTER — HOSPITAL ENCOUNTER (OUTPATIENT)
Dept: RADIOLOGY | Age: 32
Discharge: HOME | End: 2022-06-20
Payer: COMMERCIAL

## 2022-06-20 VITALS
SYSTOLIC BLOOD PRESSURE: 130 MMHG | DIASTOLIC BLOOD PRESSURE: 90 MMHG | BODY MASS INDEX: 26.93 KG/M2 | HEIGHT: 63 IN | WEIGHT: 152 LBS

## 2022-06-20 DIAGNOSIS — R10.2 PELVIC PAIN: ICD-10-CM

## 2022-06-20 DIAGNOSIS — Z32.01 POSITIVE PREGNANCY TEST: ICD-10-CM

## 2022-06-20 DIAGNOSIS — Z32.02 NEGATIVE PREGNANCY TEST: ICD-10-CM

## 2022-06-20 DIAGNOSIS — R10.2 PELVIC PAIN: Primary | ICD-10-CM

## 2022-06-20 PROBLEM — F31.31 BIPOLAR AFFECTIVE DISORDER, CURRENTLY DEPRESSED, MILD (HCC): Status: ACTIVE | Noted: 2021-03-25

## 2022-06-20 PROBLEM — F32.9 MAJOR DEPRESSIVE DISORDER, SINGLE EPISODE, UNSPECIFIED: Status: ACTIVE | Noted: 2021-03-25

## 2022-06-20 PROBLEM — Z30.09 ENCOUNTER FOR COUNSELING REGARDING CONTRACEPTION: Status: RESOLVED | Noted: 2022-06-06 | Resolved: 2022-06-20

## 2022-06-20 PROBLEM — Q27.9 VENOUS ANOMALY: Status: ACTIVE | Noted: 2021-03-25

## 2022-06-20 PROBLEM — I62.9 INTRACRANIAL HEMORRHAGE (HCC): Status: ACTIVE | Noted: 2019-08-13

## 2022-06-20 LAB
B-HCG SERPL-ACNC: 44 MIU/ML
SL AMB POCT URINE HCG: NORMAL

## 2022-06-20 PROCEDURE — 76815 OB US LIMITED FETUS(S): CPT

## 2022-06-20 PROCEDURE — 87491 CHLMYD TRACH DNA AMP PROBE: CPT | Performed by: PHYSICIAN ASSISTANT

## 2022-06-20 PROCEDURE — 36415 COLL VENOUS BLD VENIPUNCTURE: CPT

## 2022-06-20 PROCEDURE — 87591 N.GONORRHOEAE DNA AMP PROB: CPT | Performed by: PHYSICIAN ASSISTANT

## 2022-06-20 PROCEDURE — 96372 THER/PROPH/DIAG INJ SC/IM: CPT | Performed by: PHYSICIAN ASSISTANT

## 2022-06-20 PROCEDURE — 84702 CHORIONIC GONADOTROPIN TEST: CPT

## 2022-06-20 PROCEDURE — 99214 OFFICE O/P EST MOD 30 MIN: CPT | Performed by: PHYSICIAN ASSISTANT

## 2022-06-20 RX ORDER — DOXYCYCLINE 100 MG/1
100 TABLET ORAL 2 TIMES DAILY
Qty: 28 TABLET | Refills: 0 | Status: SHIPPED | OUTPATIENT
Start: 2022-06-20 | End: 2022-06-24

## 2022-06-20 RX ORDER — CEFTRIAXONE SODIUM 250 MG/1
250 INJECTION, POWDER, FOR SOLUTION INTRAMUSCULAR; INTRAVENOUS ONCE
Status: COMPLETED | OUTPATIENT
Start: 2022-06-20 | End: 2022-06-20

## 2022-06-20 RX ADMIN — CEFTRIAXONE SODIUM 250 MG: 250 INJECTION, POWDER, FOR SOLUTION INTRAMUSCULAR; INTRAVENOUS at 14:25

## 2022-06-20 NOTE — PROGRESS NOTES
Devin Shepard Esperanza  1990    S:  27 y o  female here for a problem visit  She complains of a vaginal lump that she felt on 6/2/22  On that day, she started a medical termination of pregnancy of a 5 week pregnancy  She says the lump is not painful but she never felt it before  She started her medical termination (oral medication) on 6/2/22 and started bleeding on 6/3/22  She has had daily vaginal bleeding since then - mostly light days but occasionally heavier  She is having pelvic pain and cramping - says her uterus feels swollen and like it is "starting to cave in "     She denies unusual vaginal discharge (other than her bleeding) or vaginal odor  She denies fevers or chills  She says her mom had a hysterectomy because her "uterus was swollen to 3x its normal size "     She has a male partner of 2 years  She says things are good there  She is ok with STD testing today  She did begin progesterone only birth control pills on 6/6/22 when she had a consult here with SARTHAK Donohue (says she did not tell Lala Gray that she was in the process of a medical termination or that she was pregnant)  She started her pill because she figured she was bleeding anyway so she would just start them           Past Medical History:   Diagnosis Date    Depression     Hypertension     Varicella      Family History   Problem Relation Age of Onset    Heart disease Father     Hypertension Father     Spina bifida Cousin     No Known Problems Mother     No Known Problems Brother     No Known Problems Daughter     No Known Problems Son     Breast cancer Neg Hx     Colon cancer Neg Hx     Ovarian cancer Neg Hx     Cervical cancer Neg Hx     Uterine cancer Neg Hx      Social History     Socioeconomic History    Marital status: Single     Spouse name: FOB name is RUBA    Number of children: Not on file    Years of education: Not on file    Highest education level: Not on file   Occupational History  Occupation: [employer]    Occupation: [employer]     Comment: , starting soon   Tobacco Use    Smoking status: Never Smoker    Smokeless tobacco: Never Used   Substance and Sexual Activity    Alcohol use: No    Drug use: No    Sexual activity: Yes     Partners: Male   Other Topics Concern    Not on file   Social History Narrative    Not on file     Social Determinants of Health     Financial Resource Strain: Not on file   Food Insecurity: Not on file   Transportation Needs: Not on file   Physical Activity: Not on file   Stress: Not on file   Social Connections: Not on file   Intimate Partner Violence: Not on file   Housing Stability: Not on file       Review of Systems   Respiratory: Negative  Cardiovascular: Negative  Gastrointestinal: Negative for constipation and diarrhea  Genitourinary: Negative for difficulty urinating, pelvic pain, vaginal bleeding, vaginal discharge, itching or odor  O:  /90 (BP Location: Left arm, Patient Position: Sitting, Cuff Size: Standard)   Ht 5' 3" (1 6 m)   Wt 68 9 kg (152 lb)   BMI 26 93 kg/m²   She appears well and is in no distress  Abdomen is soft and nontender  External genitals are normal without lesions or rashes  Vagina has creamy mauve discharge present   Cervix is normal, no lesions or discharge  Uterus is moderately tender  Adnexa are nontender, no pelvic masses appreciated    Urine pregnancy test is positive     A/P: Pelvic pain    Positive pregnancy test   Irregular vaginal bleeding    Will check quant HCG now and stat pelvic US  will cover for endometritis  Doxycycline 100mg po BID x 7 days  Rocephin 250mg IM given today  Will follow-up with patient after results are available  To present to the ER if pain significantly worse, heavy vaginal bleeding

## 2022-06-21 ENCOUNTER — TELEPHONE (OUTPATIENT)
Dept: OBGYN CLINIC | Facility: CLINIC | Age: 32
End: 2022-06-21

## 2022-06-21 DIAGNOSIS — Z32.02 NEGATIVE PREGNANCY TEST: Primary | ICD-10-CM

## 2022-06-23 ENCOUNTER — TELEPHONE (OUTPATIENT)
Dept: OBGYN CLINIC | Facility: CLINIC | Age: 32
End: 2022-06-23

## 2022-06-23 LAB
C TRACH DNA SPEC QL NAA+PROBE: NEGATIVE
N GONORRHOEA DNA SPEC QL NAA+PROBE: NEGATIVE

## 2022-06-23 NOTE — TELEPHONE ENCOUNTER
W87 is prov,  Pt has vaginal blisters that broke opened yesterday 6/22  (pt in pain),   pls call pt to advise,   Thanks

## 2022-06-23 NOTE — TELEPHONE ENCOUNTER
Pt finished 1 abic and still on doxy, She has blisters around her vagina that are breaking open  She is not familiar with herpes, I told her not to put anything on it and gave her an ov tomorrow  She needs another hcg also   Last one 44   Thx

## 2022-06-24 ENCOUNTER — OFFICE VISIT (OUTPATIENT)
Dept: OBGYN CLINIC | Facility: CLINIC | Age: 32
End: 2022-06-24
Payer: COMMERCIAL

## 2022-06-24 VITALS — BODY MASS INDEX: 27.46 KG/M2 | WEIGHT: 155 LBS | SYSTOLIC BLOOD PRESSURE: 130 MMHG | DIASTOLIC BLOOD PRESSURE: 86 MMHG

## 2022-06-24 DIAGNOSIS — N76.6 VULVAR ULCER: ICD-10-CM

## 2022-06-24 DIAGNOSIS — N71.9 ENDOMETRITIS: ICD-10-CM

## 2022-06-24 PROCEDURE — 99213 OFFICE O/P EST LOW 20 MIN: CPT | Performed by: PHYSICIAN ASSISTANT

## 2022-06-24 PROCEDURE — 87529 HSV DNA AMP PROBE: CPT | Performed by: PHYSICIAN ASSISTANT

## 2022-06-24 RX ORDER — LIDOCAINE HYDROCHLORIDE 20 MG/ML
JELLY TOPICAL
Qty: 30 ML | Refills: 0 | Status: SHIPPED | OUTPATIENT
Start: 2022-06-24

## 2022-06-24 RX ORDER — METRONIDAZOLE 500 MG/1
500 TABLET ORAL EVERY 12 HOURS SCHEDULED
Qty: 14 TABLET | Refills: 0 | Status: SHIPPED | OUTPATIENT
Start: 2022-06-24 | End: 2022-07-01

## 2022-06-24 NOTE — PROGRESS NOTES
Katya Olvera Esperanza  1990    S:  27 y o  female here for a problem visit  She called yesterday saying she had vaginal blisters that had opened and were very painful  Today she reports they are much better in terms of pain  We are also following her quant HCG down following a medical termination  On 6/20 her quant HCG was 44  She has a slip to repeat that next week  When I saw her on Monday, suspicion was also for endometritis and she was given Rocephin 250mg IM and was started on doxycycline 100mg po BID  She says she stopped it because she thinks the doxy is the reason for her vulvar soreness   She says her daughter has similar reactions to different things (vulvar soreness)    Past Medical History:   Diagnosis Date    Depression     Hypertension     Varicella      Family History   Problem Relation Age of Onset    Heart disease Father     Hypertension Father     Spina bifida Cousin     No Known Problems Mother     No Known Problems Brother     No Known Problems Daughter     No Known Problems Son     Breast cancer Neg Hx     Colon cancer Neg Hx     Ovarian cancer Neg Hx     Cervical cancer Neg Hx     Uterine cancer Neg Hx      Social History     Socioeconomic History    Marital status: Single     Spouse name: CARLOTAB name swapnil YEH    Number of children: None    Years of education: None    Highest education level: None   Occupational History    Occupation: [employer]    Occupation: [employer]     Comment: [employer], starting soon   Tobacco Use    Smoking status: Never Smoker    Smokeless tobacco: Never Used   Substance and Sexual Activity    Alcohol use: No    Drug use: No    Sexual activity: Yes     Partners: Male   Other Topics Concern    None   Social History Narrative    None     Social Determinants of Health     Financial Resource Strain: Not on file   Food Insecurity: Not on file   Transportation Needs: Not on file   Physical Activity: Not on file   Stress: Not on file   Social Connections: Not on file   Intimate Partner Violence: Not on file   Housing Stability: Not on file       Review of Systems   Respiratory: Negative  Cardiovascular: Negative  Gastrointestinal: Negative for constipation and diarrhea  Genitourinary: Negative for difficulty urinating, pelvic pain, vaginal bleeding, vaginal discharge, itching or odor  O:  /86 (BP Location: Right arm, Patient Position: Sitting, Cuff Size: Standard)   Wt 70 3 kg (155 lb)   BMI 27 46 kg/m²   She appears well and is in no distress  Abdomen is soft and nontender  External genitals show a large erosion of the inner posterior left labia majora that is very tender  Vagina is normal, no discharge or bleeding noted  Cervix is normal, no lesions or discharge    A/P: Vulvar erosion  Check HSV swab  Lidocaine gel sent to pharmacy  Hx possible endometritis  Change to Flagyl 500mg po BID x 7 days  Return for recheck as scheduled  Call sooner with any worsening

## 2022-06-28 ENCOUNTER — TELEPHONE (OUTPATIENT)
Dept: OBGYN CLINIC | Facility: CLINIC | Age: 32
End: 2022-06-28

## 2022-06-28 DIAGNOSIS — Z30.9 ENCOUNTER FOR CONTRACEPTIVE MANAGEMENT, UNSPECIFIED TYPE: Primary | ICD-10-CM

## 2022-06-28 RX ORDER — ACETAMINOPHEN AND CODEINE PHOSPHATE 120; 12 MG/5ML; MG/5ML
1 SOLUTION ORAL DAILY
Qty: 28 TABLET | Refills: 3 | Status: SHIPPED | OUTPATIENT
Start: 2022-06-28 | End: 2022-07-26

## 2022-06-28 NOTE — TELEPHONE ENCOUNTER
Pt last seen 6/6 by pierre, 4 days after her termination  She was given an rx for junel, but thought it was causing a lot of bleeding aand was d/c  Pt has stopped bleeding and req js to order med again     She has enough of past rx till [facility]

## 2022-06-28 NOTE — TELEPHONE ENCOUNTER
Pt seen last 6/24 rosemarie Munoz,  Pt would like a call regarding a RX that was from another facility but she wants us to advise her,  Please call pt to discuss,   Thanks

## 2022-06-28 NOTE — TELEPHONE ENCOUNTER
Per my notes in Early June I gave her a script for POP  I am not comfortable giving her ADRIA/ Estrogen containing birth control due to intermittent elevated BP  She should be able to call pharmacy to get a new pill pack as there were 3 refills on script     She should be scheduled for a f/u visit in 3 months to see how she is doing on this version of contraception

## 2022-06-30 LAB
HSV1 DNA SPEC QL NAA+PROBE: NEGATIVE
HSV2 DNA SPEC QL NAA+PROBE: NEGATIVE

## 2022-08-05 ENCOUNTER — OFFICE VISIT (OUTPATIENT)
Dept: URGENT CARE | Facility: MEDICAL CENTER | Age: 32
End: 2022-08-05
Payer: COMMERCIAL

## 2022-08-05 VITALS
HEIGHT: 63 IN | OXYGEN SATURATION: 99 % | HEART RATE: 80 BPM | WEIGHT: 152 LBS | RESPIRATION RATE: 20 BRPM | BODY MASS INDEX: 26.93 KG/M2 | TEMPERATURE: 98 F

## 2022-08-05 DIAGNOSIS — Z20.822 CLOSE EXPOSURE TO COVID-19 VIRUS: ICD-10-CM

## 2022-08-05 DIAGNOSIS — R51.9 ACUTE NONINTRACTABLE HEADACHE, UNSPECIFIED HEADACHE TYPE: Primary | ICD-10-CM

## 2022-08-05 PROCEDURE — 99213 OFFICE O/P EST LOW 20 MIN: CPT | Performed by: PHYSICIAN ASSISTANT

## 2022-08-05 PROCEDURE — S9088 SERVICES PROVIDED IN URGENT: HCPCS | Performed by: PHYSICIAN ASSISTANT

## 2022-08-05 PROCEDURE — U0005 INFEC AGEN DETEC AMPLI PROBE: HCPCS | Performed by: PHYSICIAN ASSISTANT

## 2022-08-05 PROCEDURE — U0003 INFECTIOUS AGENT DETECTION BY NUCLEIC ACID (DNA OR RNA); SEVERE ACUTE RESPIRATORY SYNDROME CORONAVIRUS 2 (SARS-COV-2) (CORONAVIRUS DISEASE [COVID-19]), AMPLIFIED PROBE TECHNIQUE, MAKING USE OF HIGH THROUGHPUT TECHNOLOGIES AS DESCRIBED BY CMS-2020-01-R: HCPCS | Performed by: PHYSICIAN ASSISTANT

## 2022-08-05 NOTE — PATIENT INSTRUCTIONS
1  Ibuprofen and/or acetaminophen as needed for headache  2  Quarantine pending the results of your COVID swab

## 2022-08-05 NOTE — PROGRESS NOTES
[employer] [facility]        NAME: Ervin Barrera is a 27 y o  female  : 1990    MRN: [id number]  DATE: 2022  TIME: 7:08 PM    Assessment and Plan   Acute nonintractable headache, unspecified headache type [R51 9]  1  Acute nonintractable headache, unspecified headache type  COVID Only -Office Collect   2  Close exposure to COVID-19 virus           Patient Instructions     1  Ibuprofen and/or acetaminophen as needed for headache  2  Quarantine pending the results of your COVID swab  Chief Complaint     Chief Complaint   Patient presents with    exposed to covid; requesting covid swab     Exposure to covid-19 requesting swab          History of Present Illness       76-year-old female patient who was exposed to Richard from her mother 3 days ago  Patient's only complaint is a persistent headache  She denies fever, congestion, chills, cough, chest pain, shortness of breath  She is not COVID vaccinated  Review of Systems   Review of Systems   Constitutional: Negative for chills and fever  HENT: Negative for ear pain and sore throat  Eyes: Negative for pain and visual disturbance  Respiratory: Negative for cough and shortness of breath  Cardiovascular: Negative for chest pain and palpitations  Gastrointestinal: Negative for abdominal pain and vomiting  Genitourinary: Negative for dysuria and hematuria  Musculoskeletal: Negative for arthralgias and back pain  Skin: Negative for color change and rash  Neurological: Positive for headaches  Negative for seizures and syncope  All other systems reviewed and are negative          Current Medications       Current Outpatient Medications:     albuterol (PROVENTIL HFA,VENTOLIN HFA) 90 mcg/act inhaler, Two puffs every four hours as needed for wheezing, Disp: , Rfl:     buPROPion (WELLBUTRIN XL) 300 mg 24 hr tablet, Take 300 mg by mouth daily, Disp: , Rfl:     cetirizine (ZyrTEC) 10 mg tablet, Take 10 mg by mouth, Disp: , Rfl:     fluticasone (FLONASE) 50 mcg/act nasal spray, 2 sprays into each nostril, Disp: , Rfl:     hydrOXYzine HCL (ATARAX) 10 mg tablet, Take 10 mg by mouth, Disp: , Rfl:     hydrOXYzine pamoate (VISTARIL) 25 mg capsule, TAKE ONE CAPSULE BY MOUTH THREE TIMES A DAY AS NEEDED FOR ANXIETY, Disp: , Rfl:     lidocaine (XYLOCAINE) 2 % topical gel, Apply q 2 hours PRN for pain, Disp: 30 mL, Rfl: 0    meloxicam (MOBIC) 15 mg tablet, Daily, Disp: , Rfl:     montelukast (SINGULAIR) 10 mg tablet, Take 10 mg by mouth, Disp: , Rfl:     norethindrone (Ct) 0 35 MG tablet, Take 1 tablet (0 35 mg total) by mouth daily for 28 days, Disp: 28 tablet, Rfl: 3    Current Allergies     Allergies as of 2022 - Reviewed 2022   Allergen Reaction Noted    Poison ivy extract Rash 2015            The following portions of the patient's history were reviewed and updated as appropriate: allergies, current medications, past family history, past medical history, past social history, past surgical history and problem list      Past Medical History:   Diagnosis Date    Depression     Hypertension     Varicella        Past Surgical History:   Procedure Laterality Date     SECTION      MYRINGOTOMY W/ TUBES      [place]  DELIV[place] ONLY N/A 2018    Procedure:  SECTION (); Surgeon: Brandon Hamilton MD;  Location: [facility] ;  Service: Obstetrics       Family History   Problem Relation Age of Onset    Heart disease Father     Hypertension Father     Spina bifida Cousin     No Known Problems Mother     No Known Problems Brother     No Known Problems Daughter     No Known Problems Son     Breast cancer Neg Hx     Colon cancer Neg Hx     Ovarian cancer Neg Hx     Cervical cancer Neg Hx     Uterine cancer Neg Hx          Medications have been verified          Objective   Pulse 80   Temp 98 °F (36 7 °C)   Resp 20   Ht 5' 3" (1 6 m)   Wt 68 9 kg (152 lb)   SpO2 99%   BMI 26 93 kg/m²        Physical Exam     Physical Exam  Constitutional:       General: She is not in acute distress  Appearance: Normal appearance  She is not ill-appearing  HENT:      Head: Normocephalic  Right Ear: Tympanic membrane normal       Left Ear: Tympanic membrane normal       Nose: Nose normal       Mouth/Throat:      Mouth: Mucous membranes are moist       Pharynx: Oropharynx is clear  Eyes:      Conjunctiva/sclera: Conjunctivae normal       Pupils: Pupils are equal, round, and reactive to light  Cardiovascular:      Rate and Rhythm: Normal rate and regular rhythm  Pulses: Normal pulses  Heart sounds: Normal heart sounds  Pulmonary:      Effort: Pulmonary effort is normal       Breath sounds: Normal breath sounds  Musculoskeletal:         General: Normal range of motion  Cervical back: Normal range of motion  Skin:     General: Skin is warm and dry  Capillary Refill: Capillary refill takes less than 2 seconds  Neurological:      General: No focal deficit present  Mental Status: She is alert and oriented to person, place, and time     Psychiatric:         Mood and Affect: Mood normal          Behavior: Behavior normal

## 2022-08-06 LAB — SARS-COV-2 RNA RESP QL NAA+PROBE: NEGATIVE

## 2022-10-12 ENCOUNTER — TELEPHONE (OUTPATIENT)
Dept: OBGYN CLINIC | Facility: CLINIC | Age: 32
End: 2022-10-12

## 2022-10-12 DIAGNOSIS — Z30.9 ENCOUNTER FOR CONTRACEPTIVE MANAGEMENT, UNSPECIFIED TYPE: ICD-10-CM

## 2022-10-12 RX ORDER — ACETAMINOPHEN AND CODEINE PHOSPHATE 120; 12 MG/5ML; MG/5ML
1 SOLUTION ORAL DAILY
Qty: 28 TABLET | Refills: 3 | Status: SHIPPED | OUTPATIENT
Start: 2022-10-12 | End: 2023-02-01

## 2022-10-12 NOTE — TELEPHONE ENCOUNTER
Patient needs a refill on her [facility] - she scheduled her yearly for 1/16 with [place]       She needs a new pack by the end of the week

## 2022-12-24 ENCOUNTER — OFFICE VISIT (OUTPATIENT)
Dept: URGENT CARE | Facility: MEDICAL CENTER | Age: 32
End: 2022-12-24

## 2022-12-24 VITALS
RESPIRATION RATE: 18 BRPM | HEART RATE: 76 BPM | DIASTOLIC BLOOD PRESSURE: 99 MMHG | WEIGHT: 158 LBS | HEIGHT: 63 IN | BODY MASS INDEX: 28 KG/M2 | OXYGEN SATURATION: 100 % | TEMPERATURE: 98.6 F | SYSTOLIC BLOOD PRESSURE: 156 MMHG

## 2022-12-24 DIAGNOSIS — R30.0 DYSURIA: Primary | ICD-10-CM

## 2022-12-24 LAB
SL AMB  POCT GLUCOSE, UA: ABNORMAL
SL AMB LEUKOCYTE ESTERASE,UA: ABNORMAL
SL AMB POCT BILIRUBIN,UA: ABNORMAL
SL AMB POCT BLOOD,UA: ABNORMAL
SL AMB POCT CLARITY,UA: ABNORMAL
SL AMB POCT COLOR,UA: ABNORMAL
SL AMB POCT KETONES,UA: ABNORMAL
SL AMB POCT NITRITE,UA: POSITIVE
SL AMB POCT PH,UA: 6
SL AMB POCT SPECIFIC GRAVITY,UA: 1.01
SL AMB POCT URINE PROTEIN: ABNORMAL
SL AMB POCT UROBILINOGEN: 0.2

## 2022-12-24 RX ORDER — NITROFURANTOIN 25; 75 MG/1; MG/1
100 CAPSULE ORAL 2 TIMES DAILY
Qty: 14 CAPSULE | Refills: 0 | Status: SHIPPED | OUTPATIENT
Start: 2022-12-24 | End: 2022-12-31

## 2022-12-24 NOTE — PROGRESS NOTES
[id number]        NAME: Hoa Loera is a 34 y o  female  : 1990    MRN: [id number]  DATE: 2022  TIME: 12:14 PM    Assessment and Plan   Dysuria [R30 0]  1  Dysuria  Urine culture    POCT urine dip    nitrofurantoin (MACROBID) 100 mg capsule            Patient Instructions     Dysuria  Macrobid as directed  Follow up with PCP in 3-5 days  Proceed to  ER if symptoms worsen  Chief Complaint     Chief Complaint   Patient presents with   • Possible UTI     Pt  C/O dysuria, urinary frequency, and foul smelling urine for the past 2 months  History of Present Illness       76-year-old female who presents complaining of frequency, urgency, dysuria times 2 months that have progressively worsened over specially over the last 2 days  Denies fevers, chills      Review of Systems   Review of Systems   Constitutional: Negative  HENT: Negative  Eyes: Negative  Respiratory: Negative  Negative for cough, chest tightness, shortness of breath, wheezing and stridor  Cardiovascular: Negative  Negative for chest pain, palpitations and leg swelling  Gastrointestinal: Negative  Genitourinary: Positive for dysuria, frequency and urgency  Negative for decreased urine volume, difficulty urinating, dyspareunia, enuresis, flank pain, genital sores, hematuria, menstrual problem, pelvic pain, vaginal bleeding, vaginal discharge and vaginal pain           Current Medications       Current Outpatient Medications:   •  albuterol (PROVENTIL HFA,VENTOLIN HFA) 90 mcg/act inhaler, Two puffs every four hours as needed for wheezing, Disp: , Rfl:   •  buPROPion (WELLBUTRIN XL) 300 mg 24 hr tablet, Take 300 mg by mouth daily, Disp: , Rfl:   •  cetirizine (ZyrTEC) 10 mg tablet, Take 10 mg by mouth, Disp: , Rfl:   •  fluticasone (FLONASE) 50 mcg/act nasal spray, 2 sprays into each nostril, Disp: , Rfl:   •  hydrOXYzine HCL (ATARAX) 10 mg tablet, Take 10 mg by mouth, Disp: , Rfl:   • montelukast (SINGULAIR) 10 mg tablet, Take 10 mg by mouth, Disp: , Rfl:   •  nitrofurantoin (MACROBID) 100 mg capsule, Take 1 capsule (100 mg total) by mouth 2 (two) times a day for 7 days, Disp: 14 capsule, Rfl: 0  •  norethindrone (Ct) 0 35 MG tablet, Take 1 tablet (0 35 mg total) by mouth daily, Disp: 28 tablet, Rfl: 3  •  hydrOXYzine pamoate (VISTARIL) 25 mg capsule, TAKE ONE CAPSULE BY MOUTH THREE TIMES A DAY AS NEEDED FOR ANXIETY, Disp: , Rfl:   •  lidocaine (XYLOCAINE) 2 % topical gel, Apply q 2 hours PRN for pain (Patient not taking: Reported on 2022), Disp: 30 mL, Rfl: 0  •  meloxicam (MOBIC) 15 mg tablet, Daily (Patient not taking: Reported on 2022), Disp: , Rfl:   •  norethindrone (MICRONOR) 0 35 MG tablet, TAKE 1 TABLET BY MOUTH DAILY FOR 28 DAYS , Disp: 28 tablet, Rfl: 0    Current Allergies     Allergies as of 2022 - Reviewed 2022   Allergen Reaction Noted   • Poison ivy extract Rash 2015            The following portions of the patient's history were reviewed and updated as appropriate: allergies, current medications, past family history, past medical history, past social history, past surgical history and problem list      Past Medical History:   Diagnosis Date   • Depression    • Hypertension    • Varicella        Past Surgical History:   Procedure Laterality Date   •  SECTION     • MYRINGOTOMY W/ TUBES     • [place]  DELIVERY ONLY N/A 2018    Procedure:  SECTION ();   Surgeon: Brea Diaz MD;  Location: [facility];  Service: Obstetrics       Family History   Problem Relation Age of Onset   • Heart disease Father    • Hypertension Father    • Spina bifida Cousin    • No Known Problems Mother    • No Known Problems Brother    • No Known Problems Daughter    • No Known Problems Son    • Breast cancer Neg Hx    • Colon cancer Neg Hx    • Ovarian cancer Neg Hx    • Cervical cancer Neg Hx    • Uterine cancer Neg Hx Medications have been verified  Objective   /99   Pulse 76   Temp 98 6 °F (37 °C)   Resp 18   Ht 5' 3" (1 6 m)   Wt 71 7 kg (158 lb)   SpO2 100%   BMI 27 99 kg/m²        Physical Exam     Physical Exam  Constitutional:       Appearance: She is well-developed  HENT:      Head: Normocephalic and atraumatic  Right Ear: External ear normal       Left Ear: External ear normal       Nose: Nose normal       Mouth/Throat:      Pharynx: No oropharyngeal exudate  Cardiovascular:      Rate and Rhythm: Normal rate and regular rhythm  Heart sounds: Normal heart sounds  Pulmonary:      Effort: Pulmonary effort is normal  No respiratory distress  Breath sounds: Normal breath sounds  No wheezing or rales  Chest:      Chest wall: No tenderness  Abdominal:      General: Bowel sounds are normal  There is no distension  Palpations: Abdomen is soft  There is no mass  Tenderness: There is no abdominal tenderness  There is no right CVA tenderness, left CVA tenderness, guarding or rebound  Musculoskeletal:      Cervical back: Normal range of motion and neck supple  Lymphadenopathy:      Cervical: No cervical adenopathy

## 2022-12-25 LAB — BACTERIA UR CULT: ABNORMAL

## 2022-12-26 LAB — BACTERIA UR CULT: ABNORMAL

## 2023-01-31 ENCOUNTER — TELEPHONE (OUTPATIENT)
Dept: OBGYN CLINIC | Facility: CLINIC | Age: 33
End: 2023-01-31

## 2023-01-31 DIAGNOSIS — Z30.9 ENCOUNTER FOR CONTRACEPTIVE MANAGEMENT, UNSPECIFIED TYPE: ICD-10-CM

## 2023-01-31 RX ORDER — ACETAMINOPHEN AND CODEINE PHOSPHATE 120; 12 MG/5ML; MG/5ML
1 SOLUTION ORAL DAILY
Qty: 28 TABLET | Refills: 0 | Status: SHIPPED | OUTPATIENT
Start: 2023-01-31 | End: 2023-05-23

## 2023-02-23 ENCOUNTER — ANNUAL EXAM (OUTPATIENT)
Dept: OBGYN CLINIC | Facility: MEDICAL CENTER | Age: 33
End: 2023-02-23

## 2023-02-23 VITALS
HEIGHT: 61 IN | WEIGHT: 154.6 LBS | BODY MASS INDEX: 29.19 KG/M2 | DIASTOLIC BLOOD PRESSURE: 84 MMHG | SYSTOLIC BLOOD PRESSURE: 152 MMHG

## 2023-02-23 DIAGNOSIS — Z30.9 ENCOUNTER FOR CONTRACEPTIVE MANAGEMENT, UNSPECIFIED TYPE: ICD-10-CM

## 2023-02-23 DIAGNOSIS — Z12.4 CERVICAL CANCER SCREENING: ICD-10-CM

## 2023-02-23 DIAGNOSIS — Z01.419 ENCOUNTER FOR ANNUAL ROUTINE GYNECOLOGICAL EXAMINATION: Primary | ICD-10-CM

## 2023-02-23 PROBLEM — I10 ESSENTIAL HYPERTENSION: Status: ACTIVE | Noted: 2023-02-23

## 2023-02-23 RX ORDER — ACETAMINOPHEN AND CODEINE PHOSPHATE 120; 12 MG/5ML; MG/5ML
1 SOLUTION ORAL DAILY
Qty: 28 TABLET | Refills: 12 | Status: SHIPPED | OUTPATIENT
Start: 2023-02-23 | End: 2023-06-15

## 2023-02-23 NOTE — ASSESSMENT & PLAN NOTE
BP mildly elevated today  Elevated on previous visit as well  Not on any meds for this  Has not followed up with PCP since switch in last year     Strongly encouraged f/u with PCP to achieve adequate BP

## 2023-02-23 NOTE — PROGRESS NOTES
Subjective:        Nicky Herndon is a 34 y o  female  Here for Gynecologic Exam (Patient presents for a routine annual visit/Menarche- Y/O/Last Pap Smear-  3/20/18 neg--collect today/LMP-23 no issues/Birth control-norethindrone  Needs refills//Non smoker/Social drinker/Currently sexually active/No family history of uterine, ovarian, cervical or breast cancer  //No concerns/questions for today's visit /)      GYN HPI  Here for annual gyn exam  Menstrual cycle:  Patient denies menstrual complaints  Regular monthly menses, not excessive  Vaginal c/o: denies  Urinary c/o: denies  Breast complaints:denies  She does do self breast Exams    Sexually active: yes  Denies c/o r/t SA  Contraception: POP  She reports she feels safe at home  The following portions of the patient's history were reviewed and updated as appropriate: allergies, current medications, past family history, past medical history, past social history, past surgical history, and problem list     Hereditary Cancer Screening  Cancer-related family history is negative for Breast cancer, Colon cancer, Ovarian cancer, Cervical cancer, and Uterine cancer  Substance Abuse Screening Completed  See hx and flowsheet  Screens Positive for none     HEALTH MAINTENANCE SCREENINGS:    History of abnormal pap: No, Last Papanicolaou test:  2018        Review of Systems   Constitutional: Negative for appetite change, chills, fatigue, fever and unexpected weight change  HENT: Negative  Eyes: Negative  Respiratory: Negative for chest tightness and shortness of breath  Cardiovascular: Negative for chest pain and palpitations  Gastrointestinal: Negative for abdominal pain, constipation and vomiting  Endocrine: Negative for cold intolerance and heat intolerance  Genitourinary:        As per HPI   Musculoskeletal: Negative for back pain, joint swelling and neck pain  Skin: Negative for color change and rash     Neurological: Negative for dizziness, weakness and numbness  Hematological: Does not bruise/bleed easily  Psychiatric/Behavioral: Negative  Objective:  /84 (BP Location: Left arm, Patient Position: Sitting, Cuff Size: Standard)   Ht 5' 1 25" (1 556 m)   Wt 70 1 kg (154 lb 9 6 oz)   LMP 02/18/2023 (Exact Date)   BMI 28 97 kg/m²        Physical Exam  Constitutional:       General: She is not in acute distress  Appearance: Normal appearance  Genitourinary:      Vulva and rectum normal       No lesions in the vagina  Right Labia: No rash or lesions  Left Labia: No lesions or rash  No vaginal discharge, erythema, tenderness or bleeding  Right Adnexa: not tender and no mass present  Left Adnexa: not tender and no mass present  No cervical motion tenderness, discharge or friability  Uterus is not enlarged or tender  No urethral prolapse present  Pelvic exam was performed with patient in the lithotomy position  Breasts:     Breasts are symmetrical       Right: No inverted nipple, mass, nipple discharge, skin change or tenderness  Left: No inverted nipple, mass, nipple discharge, skin change or tenderness  HENT:      Head: Normocephalic and atraumatic  Cardiovascular:      Rate and Rhythm: Normal rate  Heart sounds: No murmur heard  Pulmonary:      Effort: Pulmonary effort is normal       Breath sounds: Normal breath sounds  Abdominal:      General: There is no distension  Palpations: Abdomen is soft  Tenderness: There is no abdominal tenderness  Musculoskeletal:         General: Normal range of motion  Cervical back: Normal range of motion  Lymphadenopathy:      Cervical: No cervical adenopathy  Neurological:      Mental Status: She is alert and oriented to person, place, and time  Skin:     General: Skin is warm and dry  Psychiatric:         Mood and Affect: Mood normal          Behavior: Behavior normal    Vitals reviewed  Assessment/Plan:           Hong Lieberman- Primary  Annual GYN examination completed today  Risk prevention and anticipatory guidance provided including:  • Risk for hereditary cancers: Family history reviewed and patient does not qualify for referral for genetics consult/testing  Referral to genetics oncology offered as indicated  • Calcium and vitamin D supplementation  • Dietary and lifestyle recommendations based on her age and weight  body mass index is 28 97 kg/m²       • Tobacco and alcohol use, intervention ordered if applicable  Cervical cancer screening  Previous pap smears and ASCCP screening guidelines have been reviewed  Pap smear is indicated at this time  Contraception   Currently on POP ct  No signficant adverse effects  Desires to continue  STI Screening  STI screening is declined  STI prevention discussed with use of condoms  Breast exam and breast cancer screening  Breast exam was done; , breast cancer imaging/screening is not indicated at this time  Problem List Items Addressed This Visit    None  Visit Diagnoses     Encounter for annual routine gynecological examination    -  Primary    Cervical cancer screening        Relevant Orders    Liquid-based pap, screening    Encounter for contraceptive management, unspecified type        Relevant Medications    norethindrone (Ct) 0 35 MG tablet          No orders of the defined types were placed in this encounter

## 2023-02-24 LAB
HPV HR 12 DNA CVX QL NAA+PROBE: NEGATIVE
HPV16 DNA CVX QL NAA+PROBE: NEGATIVE
HPV18 DNA CVX QL NAA+PROBE: NEGATIVE

## 2023-03-02 LAB
LAB AP GYN PRIMARY INTERPRETATION: NORMAL
Lab: NORMAL
PATH INTERP SPEC-IMP: NORMAL

## 2023-03-25 ENCOUNTER — OFFICE VISIT (OUTPATIENT)
Dept: URGENT CARE | Facility: MEDICAL CENTER | Age: 33
End: 2023-03-25

## 2023-03-25 VITALS
HEIGHT: 61 IN | OXYGEN SATURATION: 99 % | WEIGHT: 154 LBS | SYSTOLIC BLOOD PRESSURE: 150 MMHG | DIASTOLIC BLOOD PRESSURE: 80 MMHG | HEART RATE: 88 BPM | RESPIRATION RATE: 18 BRPM | BODY MASS INDEX: 29.07 KG/M2 | TEMPERATURE: 98.9 F

## 2023-03-25 DIAGNOSIS — R30.0 DYSURIA: Primary | ICD-10-CM

## 2023-03-25 LAB
SL AMB  POCT GLUCOSE, UA: ABNORMAL
SL AMB LEUKOCYTE ESTERASE,UA: ABNORMAL
SL AMB POCT BILIRUBIN,UA: ABNORMAL
SL AMB POCT BLOOD,UA: ABNORMAL
SL AMB POCT CLARITY,UA: ABNORMAL
SL AMB POCT COLOR,UA: YELLOW
SL AMB POCT KETONES,UA: ABNORMAL
SL AMB POCT NITRITE,UA: ABNORMAL
SL AMB POCT PH,UA: 7.5
SL AMB POCT SPECIFIC GRAVITY,UA: 1.01
SL AMB POCT URINE PROTEIN: ABNORMAL
SL AMB POCT UROBILINOGEN: 0.2

## 2023-03-25 RX ORDER — SULFAMETHOXAZOLE AND TRIMETHOPRIM 800; 160 MG/1; MG/1
1 TABLET ORAL EVERY 12 HOURS SCHEDULED
Qty: 14 TABLET | Refills: 0 | Status: SHIPPED | OUTPATIENT
Start: 2023-03-25 | End: 2023-04-01

## 2023-03-25 NOTE — PATIENT INSTRUCTIONS
1  Increase fluids  2  Motrin as needed for pain  3  Take bactrim DS 1 tablet twice daily x 7 days  4   Follow up with PCP in 3-5 days if symptoms persist

## 2023-03-25 NOTE — PROGRESS NOTES
[facility]        NAME: Delta Sheikh is a 34 y o  female  : 1990    MRN: [id number]  DATE: 2023  TIME: 2:33 PM    Assessment and Plan   Dysuria [R30 0]  1  Dysuria  POCT urine dip    Urine culture    sulfamethoxazole-trimethoprim (BACTRIM DS) 800-160 mg per tablet            Patient Instructions     1  Increase fluids  2  Motrin as needed for pain  3  Take bactrim DS 1 tablet twice daily x 7 days  4  Follow up with PCP in 3-5 days if symptoms persist       Chief Complaint     Chief Complaint   Patient presents with   • Difficulty Urinating     Dysuria x3 weeks          History of Present Illness       Patrick Moyer is a 26-year-old female who presents with a 2-day history of increasing urinary frequency and painful urination  Patient reports no fever, abdominal or back pain since the onset of her symptoms  She does report she has had intermittent urinary discomfort for approximately 3 weeks  Difficulty Urinating   Associated symptoms include frequency and hematuria  Pertinent negatives include no flank pain  Review of Systems   Review of Systems   Constitutional: Negative  Gastrointestinal: Negative  Genitourinary: Positive for dysuria, frequency and hematuria  Negative for flank pain           Current Medications       Current Outpatient Medications:   •  sulfamethoxazole-trimethoprim (BACTRIM DS) 800-160 mg per tablet, Take 1 tablet by mouth every 12 (twelve) hours for 7 days, Disp: 14 tablet, Rfl: 0  •  albuterol (PROVENTIL HFA,VENTOLIN HFA) 90 mcg/act inhaler, Two puffs every four hours as needed for wheezing, Disp: , Rfl:   •  buPROPion (WELLBUTRIN XL) 300 mg 24 hr tablet, Take 300 mg by mouth daily, Disp: , Rfl:   •  cetirizine (ZyrTEC) 10 mg tablet, Take 10 mg by mouth, Disp: , Rfl:   •  fluticasone (FLONASE) 50 mcg/act nasal spray, 2 sprays into each nostril, Disp: , Rfl:   •  hydrOXYzine HCL (ATARAX) 10 mg tablet, Take 10 mg by mouth, Disp: , Rfl:   •  hydrOXYzine pamoate (VISTARIL) 25 mg capsule, TAKE ONE CAPSULE BY MOUTH THREE TIMES A DAY AS NEEDED FOR ANXIETY, Disp: , Rfl:   •  lidocaine (XYLOCAINE) 2 % topical gel, Apply q 2 hours PRN for pain (Patient not taking: Reported on 2022), Disp: 30 mL, Rfl: 0  •  meloxicam (MOBIC) 15 mg tablet, Daily (Patient not taking: Reported on 2022), Disp: , Rfl:   •  montelukast (SINGULAIR) 10 mg tablet, Take 10 mg by mouth, Disp: , Rfl:   •  norethindrone (Ct) 0 35 MG tablet, Take 1 tablet (0 35 mg total) by mouth daily, Disp: 28 tablet, Rfl: 12    Current Allergies     Allergies as of 2023 - Reviewed 2023   Allergen Reaction Noted   • Doxycycline Other (See Comments) 2023   • Poison ivy extract Rash 2015            The following portions of the patient's history were reviewed and updated as appropriate: allergies, current medications, past family history, past medical history, past social history, past surgical history and problem list      Past Medical History:   Diagnosis Date   • Depression    • Hypertension    • Varicella        Past Surgical History:   Procedure Laterality Date   •  SECTION     • MYRINGOTOMY W/ TUBES     • [place]  DELIVERY ONLY N/A 2018    Procedure:  SECTION (); Surgeon: Dany Flaherty MD;  Location: [facility];  Service: Obstetrics       Family History   Problem Relation Age of Onset   • Heart disease Father    • Hypertension Father    • Spina bifida Cousin    • No Known Problems Mother    • No Known Problems Brother    • No Known Problems Daughter    • No Known Problems Son    • Breast cancer Neg Hx    • Colon cancer Neg Hx    • Ovarian cancer Neg Hx    • Cervical cancer Neg Hx    • Uterine cancer Neg Hx          Medications have been verified  Objective   /80   Pulse 88   Temp 98 9 °F (37 2 °C) (Temporal)   Resp 18   Ht 5' 1 25" (1 556 m)   Wt 69 9 kg (154 lb)   SpO2 99%   BMI 28 86 kg/m²   No LMP recorded  (Menstrual status: Birth Control)  Physical Exam     Physical Exam  Constitutional:       General: She is not in acute distress  Appearance: Normal appearance  Cardiovascular:      Rate and Rhythm: Normal rate and regular rhythm  Heart sounds: Normal heart sounds  No murmur heard  Pulmonary:      Effort: Pulmonary effort is normal       Breath sounds: Normal breath sounds  Abdominal:      General: Abdomen is flat  Bowel sounds are normal       Palpations: Abdomen is soft  Tenderness: There is abdominal tenderness in the suprapubic area  There is no right CVA tenderness or left CVA tenderness  Neurological:      Mental Status: She is alert

## 2023-03-26 LAB — BACTERIA UR CULT: ABNORMAL

## 2023-03-27 LAB — BACTERIA UR CULT: ABNORMAL

## 2023-05-03 ENCOUNTER — OFFICE VISIT (OUTPATIENT)
Dept: URGENT CARE | Facility: MEDICAL CENTER | Age: 33
End: 2023-05-03

## 2023-05-03 VITALS
RESPIRATION RATE: 18 BRPM | HEART RATE: 80 BPM | WEIGHT: 150 LBS | OXYGEN SATURATION: 99 % | TEMPERATURE: 98.9 F | HEIGHT: 61 IN | BODY MASS INDEX: 28.32 KG/M2 | DIASTOLIC BLOOD PRESSURE: 82 MMHG | SYSTOLIC BLOOD PRESSURE: 148 MMHG

## 2023-05-03 DIAGNOSIS — K52.9 NONINFECTIOUS GASTROENTERITIS, UNSPECIFIED TYPE: Primary | ICD-10-CM

## 2023-05-03 NOTE — LETTER
May 3, 2023     Patient: Danielle Brand   YOB: 1990   Date of Visit: 5/3/2023       To Whom it May Concern:    Sanamkenyatta Hollins was seen in my clinic on 5/3/2023  She may return to work on 5/4/2023  If you have any questions or concerns, please don't hesitate to call           Sincerely,          [facility]        CC: No Recipients

## 2023-05-03 NOTE — PATIENT INSTRUCTIONS
Gastroenteritis  Brat diet  Increase fluid intake  Follow up with PCP in 3-5 days  Proceed to  ER if symptoms worsen

## 2023-05-12 ENCOUNTER — OFFICE VISIT (OUTPATIENT)
Dept: URGENT CARE | Facility: MEDICAL CENTER | Age: 33
End: 2023-05-12

## 2023-05-12 VITALS
WEIGHT: 159 LBS | HEART RATE: 94 BPM | HEIGHT: 63 IN | OXYGEN SATURATION: 98 % | DIASTOLIC BLOOD PRESSURE: 90 MMHG | BODY MASS INDEX: 28.17 KG/M2 | RESPIRATION RATE: 18 BRPM | TEMPERATURE: 98 F | SYSTOLIC BLOOD PRESSURE: 143 MMHG

## 2023-05-12 DIAGNOSIS — L25.5 RHUS DERMATITIS: ICD-10-CM

## 2023-05-12 DIAGNOSIS — K04.7 DENTAL ABSCESS: Primary | ICD-10-CM

## 2023-05-12 RX ORDER — PREDNISONE 20 MG/1
TABLET ORAL
Qty: 21 TABLET | Refills: 0 | Status: SHIPPED | OUTPATIENT
Start: 2023-05-12

## 2023-05-12 RX ORDER — PENICILLIN V POTASSIUM 500 MG/1
500 TABLET ORAL EVERY 8 HOURS SCHEDULED
Qty: 30 TABLET | Refills: 0 | Status: SHIPPED | OUTPATIENT
Start: 2023-05-12 | End: 2023-05-22

## 2023-05-12 NOTE — PROGRESS NOTES
[facility]        NAME: Garry Del Castillo is a 34 y o  female  : 1990    MRN: [id number]  DATE: May 12, 2023  TIME: 5:26 PM    Assessment and Plan   Dental abscess [K04 7]  1  Dental abscess  penicillin V potassium (VEETID) 500 mg tablet      2  Rhus dermatitis  predniSONE 20 mg tablet            Patient Instructions   1  Over-the-counter ibuprofen and/or acetaminophen as discussed for pain  2  Observe a room temperature liquid diet until symptoms improve  3  Follow-up with your dentist as soon as possible  4  Go to the ER immediately for any worsening symptoms  5   Over-the-counter diphenhydramine 25 to 50 mg every 4-6 hours as needed for itch  Chief Complaint     Chief Complaint   Patient presents with   • Rash     Pt  With poison rash to her arms and trunk for the past 2 days    • Dental Pain     Pt  C/O left lower dental pain for the past 2 months  History of Present Illness       34-year-old female patient presents with 2 separate complaints  Firstly, patient complains of recurrent left lower molar pain and adjacent gum swelling and tenderness  Patient has known dental disease in that area and has a dental consultation for 2023  In the meantime the symptoms have returned  Secondly, patient reports red, itchy rash on both arms after working outside several days ago  Patient is known to have sensitivity to poison ivy  Over-the-counter medications not helping  Review of Systems   Review of Systems   Constitutional: Negative for chills and fever  HENT: Positive for dental problem and facial swelling  Negative for ear pain and sore throat  Eyes: Negative for pain and visual disturbance  Respiratory: Negative for cough and shortness of breath  Cardiovascular: Negative for chest pain and palpitations  Gastrointestinal: Negative for abdominal pain and vomiting  Genitourinary: Negative for dysuria and hematuria     Musculoskeletal: Negative for arthralgias and back pain  Skin: Positive for rash  Negative for color change  Neurological: Negative for seizures and syncope  All other systems reviewed and are negative  Current Medications       Current Outpatient Medications:   •  albuterol (PROVENTIL HFA,VENTOLIN HFA) 90 mcg/act inhaler, Two puffs every four hours as needed for wheezing, Disp: , Rfl:   •  buPROPion (WELLBUTRIN XL) 300 mg 24 hr tablet, Take 300 mg by mouth daily, Disp: , Rfl:   •  cetirizine (ZyrTEC) 10 mg tablet, Take 10 mg by mouth, Disp: , Rfl:   •  fluticasone (FLONASE) 50 mcg/act nasal spray, 2 sprays into each nostril, Disp: , Rfl:   •  hydrOXYzine pamoate (VISTARIL) 25 mg capsule, TAKE ONE CAPSULE BY MOUTH THREE TIMES A DAY AS NEEDED FOR ANXIETY, Disp: , Rfl:   •  montelukast (SINGULAIR) 10 mg tablet, Take 10 mg by mouth, Disp: , Rfl:   •  norethindrone (Ct) 0 35 MG tablet, Take 1 tablet (0 35 mg total) by mouth daily, Disp: 28 tablet, Rfl: 12  •  penicillin V potassium (VEETID) 500 mg tablet, Take 1 tablet (500 mg total) by mouth every 8 (eight) hours for 10 days, Disp: 30 tablet, Rfl: 0  •  predniSONE 20 mg tablet, Take 3 tabs all at once daily for 5 days then 2 tabs for 2 days then 1 tab for 2 days  , Disp: 21 tablet, Rfl: 0  •  hydrOXYzine HCL (ATARAX) 10 mg tablet, Take 10 mg by mouth, Disp: , Rfl:   •  lidocaine (XYLOCAINE) 2 % topical gel, Apply q 2 hours PRN for pain (Patient not taking: Reported on 12/24/2022), Disp: 30 mL, Rfl: 0  •  meloxicam (MOBIC) 15 mg tablet, Daily (Patient not taking: Reported on 12/24/2022), Disp: , Rfl:     Current Allergies     Allergies as of 05/12/2023 - Reviewed 05/12/2023   Allergen Reaction Noted   • Doxycycline Other (See Comments) 02/23/2023   • Poison ivy extract Rash 07/02/2015            The following portions of the patient's history were reviewed and updated as appropriate: allergies, current medications, past family history, past medical history, past social history, past "surgical history and problem list      Past Medical History:   Diagnosis Date   • Depression    • Hypertension    • Varicella        Past Surgical History:   Procedure Laterality Date   •  SECTION     • MYRINGOTOMY W/ TUBES     • [place]  DELIVERY ONLY N/A 2018    Procedure:  SECTION (); Surgeon: Anuradha Banks MD;  Location: [facility];  Service: Obstetrics       Family History   Problem Relation Age of Onset   • Heart disease Father    • Hypertension Father    • Spina bifida Cousin    • No Known Problems Mother    • No Known Problems Brother    • No Known Problems Daughter    • No Known Problems Son    • Breast cancer Neg Hx    • Colon cancer Neg Hx    • Ovarian cancer Neg Hx    • Cervical cancer Neg Hx    • Uterine cancer Neg Hx          Medications have been verified  Objective   /90   Pulse 94   Temp 98 °F (36 7 °C)   Resp 18   Ht 5' 3\" (1 6 m)   Wt 72 1 kg (159 lb)   SpO2 98%   BMI 28 17 kg/m²        Physical Exam     Physical Exam  Vitals and nursing note reviewed  Constitutional:       General: She is not in acute distress  Appearance: Normal appearance  HENT:      Head: Normocephalic  Nose: Nose normal       Mouth/Throat:     Eyes:      Conjunctiva/sclera: Conjunctivae normal       Pupils: Pupils are equal, round, and reactive to light  Cardiovascular:      Rate and Rhythm: Normal rate  Pulmonary:      Effort: Pulmonary effort is normal    Musculoskeletal:         General: Normal range of motion  Cervical back: Normal range of motion  Skin:     General: Skin is warm and dry  Capillary Refill: Capillary refill takes less than 2 seconds  Comments: Papulovesicular rash to both arms distal to the elbows and a streaking patch distribution  Neurological:      General: No focal deficit present  Mental Status: She is alert     Psychiatric:         Mood and Affect: Mood normal                    "

## 2023-05-12 NOTE — PATIENT INSTRUCTIONS
1  Over-the-counter ibuprofen and/or acetaminophen as discussed for pain  2  Observe a room temperature liquid diet until symptoms improve  3  Follow-up with your dentist as soon as possible  4  Go to the ER immediately for any worsening symptoms  5   Over-the-counter diphenhydramine 25 to 50 mg every 4-6 hours as needed for itch

## 2023-05-17 ENCOUNTER — OFFICE VISIT (OUTPATIENT)
Dept: URGENT CARE | Facility: MEDICAL CENTER | Age: 33
End: 2023-05-17

## 2023-05-17 VITALS
DIASTOLIC BLOOD PRESSURE: 99 MMHG | HEART RATE: 78 BPM | HEIGHT: 63 IN | RESPIRATION RATE: 18 BRPM | BODY MASS INDEX: 28.17 KG/M2 | SYSTOLIC BLOOD PRESSURE: 137 MMHG | OXYGEN SATURATION: 97 % | WEIGHT: 159 LBS | TEMPERATURE: 98 F

## 2023-05-17 DIAGNOSIS — K08.89 PAIN, DENTAL: Primary | ICD-10-CM

## 2023-05-17 RX ORDER — CLINDAMYCIN HYDROCHLORIDE 300 MG/1
300 CAPSULE ORAL 3 TIMES DAILY
Qty: 30 CAPSULE | Refills: 0 | Status: SHIPPED | OUTPATIENT
Start: 2023-05-17 | End: 2023-05-27

## 2023-05-17 RX ORDER — AMOXICILLIN 500 MG/1
CAPSULE ORAL
COMMUNITY
Start: 2023-04-06

## 2023-05-17 NOTE — PATIENT INSTRUCTIONS
1   Stop the Pen-Vee K and lieu of the clindamycin  2   Over-the-counter ibuprofen and/or acetaminophen as needed for pain  3   Stick with a room temperature liquid diet until symptoms improve  4   Follow-up with the oral surgeon as referred by your primary dentist as scheduled for 5/25/2023   5   Go to the ER immediately for any worsening symptoms

## 2023-05-17 NOTE — PROGRESS NOTES
[facility]        NAME: Sharri Barron is a 34 y o  female  : 1990    MRN: [id number]  DATE: May 17, 2023  TIME: 5:46 PM    Assessment and Plan   Pain, dental [K08 89]  1  Pain, dental  clindamycin (CLEOCIN) 300 MG capsule            Patient Instructions     1  Stop the Pen-Vee K and lieu of the clindamycin  2   Over-the-counter ibuprofen and/or acetaminophen as needed for pain  3   Stick with a room temperature liquid diet until symptoms improve  4   Follow-up with the oral surgeon as referred by your primary dentist as scheduled for 2023   5   Go to the ER immediately for any worsening symptoms  Chief Complaint     Chief Complaint   Patient presents with   • Dental Pain     Pt  With left back tooth pain that radiates into her ear  Was seen here on the  for the same  Is awaiting oral sx appointment in the           History of Present Illness       79-year-old female patient with an ongoing history of left lower posterior dental pain despite being treated with antibiotics and over-the-counter pain medications here 5 days ago  Patient did follow-up with a dentist but was directly referred to oral surgery with whom she has a consult on 2023  Patient states the pain is severe and unrelenting  She denies any fever or chills  She is sensitive to hot and cold and pressure  She is compliant with all previous care recommendations  Review of Systems   Review of Systems   Constitutional: Negative for chills and fever  HENT: Positive for dental problem and ear pain  Negative for sore throat  Eyes: Negative for pain and visual disturbance  Respiratory: Negative for cough and shortness of breath  Cardiovascular: Negative for chest pain and palpitations  Gastrointestinal: Negative for abdominal pain and vomiting  Genitourinary: Negative for dysuria and hematuria  Musculoskeletal: Negative for arthralgias and back pain     Skin: Negative for color change and rash    Neurological: Negative for seizures and syncope  All other systems reviewed and are negative  Current Medications       Current Outpatient Medications:   •  albuterol (PROVENTIL HFA,VENTOLIN HFA) 90 mcg/act inhaler, Two puffs every four hours as needed for wheezing, Disp: , Rfl:   •  buPROPion (WELLBUTRIN XL) 300 mg 24 hr tablet, Take 300 mg by mouth daily, Disp: , Rfl:   •  cetirizine (ZyrTEC) 10 mg tablet, Take 10 mg by mouth, Disp: , Rfl:   •  clindamycin (CLEOCIN) 300 MG capsule, Take 1 capsule (300 mg total) by mouth 3 (three) times a day for 10 days, Disp: 30 capsule, Rfl: 0  •  fluticasone (FLONASE) 50 mcg/act nasal spray, 2 sprays into each nostril, Disp: , Rfl:   •  hydrOXYzine HCL (ATARAX) 10 mg tablet, Take 10 mg by mouth, Disp: , Rfl:   •  hydrOXYzine pamoate (VISTARIL) 25 mg capsule, TAKE ONE CAPSULE BY MOUTH THREE TIMES A DAY AS NEEDED FOR ANXIETY, Disp: , Rfl:   •  montelukast (SINGULAIR) 10 mg tablet, Take 10 mg by mouth, Disp: , Rfl:   •  norethindrone (Ct) 0 35 MG tablet, Take 1 tablet (0 35 mg total) by mouth daily, Disp: 28 tablet, Rfl: 12  •  penicillin V potassium (VEETID) 500 mg tablet, Take 1 tablet (500 mg total) by mouth every 8 (eight) hours for 10 days, Disp: 30 tablet, Rfl: 0  •  predniSONE 20 mg tablet, Take 3 tabs all at once daily for 5 days then 2 tabs for 2 days then 1 tab for 2 days  , Disp: 21 tablet, Rfl: 0  •  amoxicillin (AMOXIL) 500 mg capsule, TAKE 1 CAPSULE EVERY 6 HOURS, Disp: , Rfl:   •  lidocaine (XYLOCAINE) 2 % topical gel, Apply q 2 hours PRN for pain (Patient not taking: Reported on 12/24/2022), Disp: 30 mL, Rfl: 0  •  meloxicam (MOBIC) 15 mg tablet, Daily (Patient not taking: Reported on 12/24/2022), Disp: , Rfl:     Current Allergies     Allergies as of 05/17/2023 - Reviewed 05/17/2023   Allergen Reaction Noted   • Doxycycline Other (See Comments) 02/23/2023   • Poison ivy extract Rash 07/02/2015            The following portions of the patient's "history were reviewed and updated as appropriate: allergies, current medications, past family history, past medical history, past social history, past surgical history and problem list      Past Medical History:   Diagnosis Date   • Depression    • Hypertension    • Varicella        Past Surgical History:   Procedure Laterality Date   •  SECTION     • MYRINGOTOMY W/ TUBES     • NM  DELIVERY ONLY N/A 2018    Procedure:  SECTION (); Surgeon: Eddie Demarco MD;  Location: [facility];  Service: Obstetrics       Family History   Problem Relation Age of Onset   • Heart disease Father    • Hypertension Father    • Spina bifida Cousin    • No Known Problems Mother    • No Known Problems Brother    • No Known Problems Daughter    • No Known Problems Son    • Breast cancer Neg Hx    • Colon cancer Neg Hx    • Ovarian cancer Neg Hx    • Cervical cancer Neg Hx    • Uterine cancer Neg Hx          Medications have been verified  Objective   /99   Pulse 78   Temp 98 °F (36 7 °C)   Resp 18   Ht 5' 3\" (1 6 m)   Wt 72 1 kg (159 lb)   SpO2 97%   BMI 28 17 kg/m²        Physical Exam     Physical Exam  Constitutional:       General: She is in acute distress  Appearance: Normal appearance  She is not ill-appearing  HENT:      Head: Normocephalic  Right Ear: Tympanic membrane normal       Left Ear: Tympanic membrane normal       Nose: Nose normal       Mouth/Throat:      Mouth: Mucous membranes are moist       Pharynx: Oropharynx is clear  Eyes:      Conjunctiva/sclera: Conjunctivae normal       Pupils: Pupils are equal, round, and reactive to light  Cardiovascular:      Rate and Rhythm: Normal rate  Pulmonary:      Effort: Pulmonary effort is normal    Abdominal:      Tenderness: There is no abdominal tenderness  Musculoskeletal:         General: Normal range of motion  Cervical back: Normal range of motion  Skin:     General: Skin is warm and dry   " Capillary Refill: Capillary refill takes less than 2 seconds  Neurological:      Mental Status: She is alert and oriented to person, place, and time  Psychiatric:         Mood and Affect: Mood normal          Behavior: Behavior normal          Procedure:  Cottonball soaked with viscous lidocaine 4% applied to the left lower proximal jaw area at the site of intended injection  Once topical anesthesia was established, left alveolar nerve block was performed with 5 cc of a one-to-one mixture of lidocaine 1% with epinephrine and bupivacaine 0 25% without epi  There were no complications and the patient endorsed nearly complete pain relief at the time of discharge

## 2023-07-18 ENCOUNTER — OFFICE VISIT (OUTPATIENT)
Dept: URGENT CARE | Facility: MEDICAL CENTER | Age: 33
End: 2023-07-18
Payer: COMMERCIAL

## 2023-07-18 VITALS
SYSTOLIC BLOOD PRESSURE: 166 MMHG | HEART RATE: 70 BPM | TEMPERATURE: 98.5 F | WEIGHT: 145 LBS | DIASTOLIC BLOOD PRESSURE: 105 MMHG | OXYGEN SATURATION: 97 % | HEIGHT: 63 IN | RESPIRATION RATE: 18 BRPM | BODY MASS INDEX: 25.69 KG/M2

## 2023-07-18 DIAGNOSIS — R30.0 BURNING WITH URINATION: Primary | ICD-10-CM

## 2023-07-18 LAB
SL AMB  POCT GLUCOSE, UA: ABNORMAL
SL AMB LEUKOCYTE ESTERASE,UA: ABNORMAL
SL AMB POCT BILIRUBIN,UA: ABNORMAL
SL AMB POCT BLOOD,UA: ABNORMAL
SL AMB POCT CLARITY,UA: ABNORMAL
SL AMB POCT COLOR,UA: YELLOW
SL AMB POCT KETONES,UA: ABNORMAL
SL AMB POCT NITRITE,UA: ABNORMAL
SL AMB POCT PH,UA: 5
SL AMB POCT SPECIFIC GRAVITY,UA: 1015
SL AMB POCT URINE PROTEIN: ABNORMAL
SL AMB POCT UROBILINOGEN: 0.2

## 2023-07-18 PROCEDURE — 99213 OFFICE O/P EST LOW 20 MIN: CPT | Performed by: PHYSICIAN ASSISTANT

## 2023-07-18 PROCEDURE — 87186 SC STD MICRODIL/AGAR DIL: CPT | Performed by: PHYSICIAN ASSISTANT

## 2023-07-18 PROCEDURE — 81002 URINALYSIS NONAUTO W/O SCOPE: CPT

## 2023-07-18 PROCEDURE — 87077 CULTURE AEROBIC IDENTIFY: CPT | Performed by: PHYSICIAN ASSISTANT

## 2023-07-18 PROCEDURE — S9088 SERVICES PROVIDED IN URGENT: HCPCS | Performed by: PHYSICIAN ASSISTANT

## 2023-07-18 PROCEDURE — 87086 URINE CULTURE/COLONY COUNT: CPT | Performed by: PHYSICIAN ASSISTANT

## 2023-07-18 RX ORDER — NITROFURANTOIN 25; 75 MG/1; MG/1
100 CAPSULE ORAL 2 TIMES DAILY
Qty: 14 CAPSULE | Refills: 0 | Status: SHIPPED | OUTPATIENT
Start: 2023-07-18 | End: 2023-07-25

## 2023-07-18 NOTE — PROGRESS NOTES
[facility]        NAME: Nancy Cortez is a 34 y.o. female  : 1990    MRN: [id number]  DATE: 2023  TIME: 7:13 PM    Assessment and Plan   Burning with urination [R30.0]  1. Burning with urination  POCT urine dip            Patient Instructions     Dysuria  macrobid twice daily  Follow up with PCP in 3-5 days. Proceed to  ER if symptoms worsen. Chief Complaint     Chief Complaint   Patient presents with   • Urinary Tract Infection     Started week ago with UTI symptoms. Has burning and frequency with urination. History of Present Illness       74-year-old female who presents complaining of frequency, urgency, dysuria x1 week. States that symptoms have worsened over the last 2 days. Review of Systems   Review of Systems   Constitutional: Negative. HENT: Negative. Eyes: Negative. Respiratory: Negative. Negative for cough, chest tightness, shortness of breath, wheezing and stridor. Cardiovascular: Negative. Negative for chest pain, palpitations and leg swelling. Gastrointestinal: Negative. Genitourinary: Positive for dysuria, frequency and urgency. Negative for decreased urine volume, difficulty urinating, dyspareunia, enuresis, flank pain, genital sores, hematuria, menstrual problem, pelvic pain, vaginal bleeding, vaginal discharge and vaginal pain.          Current Medications       Current Outpatient Medications:   •  albuterol (PROVENTIL HFA,VENTOLIN HFA) 90 mcg/act inhaler, Two puffs every four hours as needed for wheezing, Disp: , Rfl:   •  amoxicillin (AMOXIL) 500 mg capsule, TAKE 1 CAPSULE EVERY 6 HOURS, Disp: , Rfl:   •  buPROPion (WELLBUTRIN XL) 300 mg 24 hr tablet, Take 300 mg by mouth daily, Disp: , Rfl:   •  cetirizine (ZyrTEC) 10 mg tablet, Take 10 mg by mouth, Disp: , Rfl:   •  fluticasone (FLONASE) 50 mcg/act nasal spray, 2 sprays into each nostril, Disp: , Rfl:   •  hydrOXYzine HCL (ATARAX) 10 mg tablet, Take 10 mg by mouth, Disp: , Rfl: •  hydrOXYzine pamoate (VISTARIL) 25 mg capsule, TAKE ONE CAPSULE BY MOUTH THREE TIMES A DAY AS NEEDED FOR ANXIETY, Disp: , Rfl:   •  montelukast (SINGULAIR) 10 mg tablet, Take 10 mg by mouth, Disp: , Rfl:   •  lidocaine (XYLOCAINE) 2 % topical gel, Apply q 2 hours PRN for pain (Patient not taking: Reported on 2022), Disp: 30 mL, Rfl: 0  •  meloxicam (MOBIC) 15 mg tablet, Daily (Patient not taking: Reported on 2022), Disp: , Rfl:   •  norethindrone (Ct) 0.35 MG tablet, Take 1 tablet (0.35 mg total) by mouth daily, Disp: 28 tablet, Rfl: 12  •  predniSONE 20 mg tablet, Take 3 tabs all at once daily for 5 days then 2 tabs for 2 days then 1 tab for 2 days. (Patient not taking: Reported on 2023), Disp: 21 tablet, Rfl: 0    Current Allergies     Allergies as of 2023 - Reviewed 2023   Allergen Reaction Noted   • Doxycycline Other (See Comments) 2023   • Poison ivy extract Rash 2015            The following portions of the patient's history were reviewed and updated as appropriate: allergies, current medications, past family history, past medical history, past social history, past surgical history and problem list.     Past Medical History:   Diagnosis Date   • Depression    • Hypertension    • Varicella        Past Surgical History:   Procedure Laterality Date   •  SECTION     • MYRINGOTOMY W/ TUBES     • [place]  DELIVERY ONLY N/A 2018    Procedure:  SECTION ();   Surgeon: Leodan Menchaca MD;  [facility] ;  Service: Obstetrics       Family History   Problem Relation Age of Onset   • Heart disease Father    • Hypertension Father    • Spina bifida Cousin    • No Known Problems Mother    • No Known Problems Brother    • No Known Problems Daughter    • No Known Problems Son    • Breast cancer Neg Hx    • Colon cancer Neg Hx    • Ovarian cancer Neg Hx    • Cervical cancer Neg Hx    • Uterine cancer Neg Hx          Medications have been verified. Objective   BP (!) 166/105   Pulse 70   Temp 98.5 °F (36.9 °C) (Temporal)   Resp 18   Ht 5' 3" (1.6 m)   Wt 65.8 kg (145 lb)   SpO2 97%   BMI 25.69 kg/m²        Physical Exam     Physical Exam  Constitutional:       Appearance: She is well-developed. HENT:      Head: Normocephalic and atraumatic. Cardiovascular:      Rate and Rhythm: Normal rate and regular rhythm. Heart sounds: Normal heart sounds. Pulmonary:      Effort: Pulmonary effort is normal. No respiratory distress. Breath sounds: Normal breath sounds. No wheezing or rales. Chest:      Chest wall: No tenderness. Abdominal:      General: Bowel sounds are normal. There is no distension. Palpations: Abdomen is soft. There is no mass. Tenderness: There is no abdominal tenderness. There is no right CVA tenderness, left CVA tenderness, guarding or rebound. Musculoskeletal:      Cervical back: Normal range of motion and neck supple. Lymphadenopathy:      Cervical: No cervical adenopathy.

## 2023-07-20 LAB — BACTERIA UR CULT: ABNORMAL

## 2023-10-03 NOTE — LETTER
March 24, 2018     Rhea Martinez MD  [address] [place] [id number]    Patient: Samantha Mata   YOB: 1990   Date of Visit: 3/22/2018       Dear Dr Savi Low: Thank you for referring Elsa Oppenheim to me for evaluation  Below are my notes for this consultation  If you have questions, please do not hesitate to call me  I look forward to following your patient along with you  Sincerely,        Minerva Dudley MD        CC: No Recipients  Minerva Dudley MD  3/22/2018  1:29 PM  Sign at close encounter  Please refer to the [facility] ultrasound report in Ob Procedures for additional information regarding the visit to the [facility]  today  Patient/Parent(s)

## 2023-11-13 ENCOUNTER — OFFICE VISIT (OUTPATIENT)
Dept: URGENT CARE | Facility: MEDICAL CENTER | Age: 33
End: 2023-11-13
Payer: COMMERCIAL

## 2023-11-13 VITALS
DIASTOLIC BLOOD PRESSURE: 103 MMHG | RESPIRATION RATE: 18 BRPM | HEART RATE: 92 BPM | TEMPERATURE: 98.2 F | SYSTOLIC BLOOD PRESSURE: 151 MMHG | WEIGHT: 157 LBS | HEIGHT: 63 IN | BODY MASS INDEX: 27.82 KG/M2 | OXYGEN SATURATION: 97 %

## 2023-11-13 DIAGNOSIS — N30.00 ACUTE CYSTITIS WITHOUT HEMATURIA: Primary | ICD-10-CM

## 2023-11-13 LAB
SL AMB  POCT GLUCOSE, UA: ABNORMAL
SL AMB LEUKOCYTE ESTERASE,UA: ABNORMAL
SL AMB POCT BILIRUBIN,UA: ABNORMAL
SL AMB POCT BLOOD,UA: ABNORMAL
SL AMB POCT CLARITY,UA: ABNORMAL
SL AMB POCT COLOR,UA: YELLOW
SL AMB POCT KETONES,UA: ABNORMAL
SL AMB POCT NITRITE,UA: ABNORMAL
SL AMB POCT PH,UA: 5
SL AMB POCT SPECIFIC GRAVITY,UA: 1
SL AMB POCT URINE PROTEIN: ABNORMAL
SL AMB POCT UROBILINOGEN: 0.2

## 2023-11-13 PROCEDURE — 87086 URINE CULTURE/COLONY COUNT: CPT | Performed by: PHYSICIAN ASSISTANT

## 2023-11-13 PROCEDURE — 81002 URINALYSIS NONAUTO W/O SCOPE: CPT | Performed by: PHYSICIAN ASSISTANT

## 2023-11-13 PROCEDURE — 99213 OFFICE O/P EST LOW 20 MIN: CPT | Performed by: PHYSICIAN ASSISTANT

## 2023-11-13 PROCEDURE — S9088 SERVICES PROVIDED IN URGENT: HCPCS | Performed by: PHYSICIAN ASSISTANT

## 2023-11-13 RX ORDER — CEPHALEXIN 500 MG/1
500 CAPSULE ORAL EVERY 12 HOURS SCHEDULED
Qty: 14 CAPSULE | Refills: 0 | Status: SHIPPED | OUTPATIENT
Start: 2023-11-13 | End: 2023-11-20

## 2023-11-13 NOTE — PROGRESS NOTES
[facility]        NAME: Maribell Marrero is a 34 y.o. female  : 1990    MRN: [id number]  DATE: 2023  TIME: 12:33 PM    Assessment and Plan   Acute cystitis without hematuria [N30.00]  1. Acute cystitis without hematuria  POCT urine dip    Urine culture    cephalexin (KEFLEX) 500 mg capsule            Patient Instructions   1. Increase oral fluid consumption. 2. Over-the-counter acetaminophen as needed for pain. 3. Return here or go to the ER for any worsening symptoms. 4. Follow-up with primary care for any symptoms lasting longer than 5 days. Chief Complaint     Chief Complaint   Patient presents with    Possible UTI     Uti sx's x 3 weeks, azo last night         History of Present Illness       39-year-old female patient with a 2 to 3-week history of recurring dysuria, frequency, urgency, hesitancy. Occasional mild incontinence. Patient denies any fever or chills. No belly pain. No nausea or vomiting. No low back pain. Patient is susceptible to UTIs. Review of Systems   Review of Systems   Constitutional:  Negative for chills and fever. HENT:  Negative for ear pain and sore throat. Eyes:  Negative for pain and visual disturbance. Respiratory:  Negative for cough and shortness of breath. Cardiovascular:  Negative for chest pain and palpitations. Gastrointestinal:  Negative for abdominal pain and vomiting. Genitourinary:  Positive for dysuria, frequency and urgency. Negative for hematuria. Musculoskeletal:  Negative for arthralgias and back pain. Skin:  Negative for color change and rash. Neurological:  Negative for seizures and syncope. All other systems reviewed and are negative.         Current Medications       Current Outpatient Medications:     albuterol (PROVENTIL HFA,VENTOLIN HFA) 90 mcg/act inhaler, Two puffs every four hours as needed for wheezing, Disp: , Rfl:     buPROPion (WELLBUTRIN XL) 300 mg 24 hr tablet, Take 300 mg by mouth daily, Disp: , Rfl:     cetirizine (ZyrTEC) 10 mg tablet, Take 10 mg by mouth, Disp: , Rfl:     fluticasone (FLONASE) 50 mcg/act nasal spray, 2 sprays into each nostril, Disp: , Rfl:     hydrOXYzine HCL (ATARAX) 10 mg tablet, Take 10 mg by mouth, Disp: , Rfl:     montelukast (SINGULAIR) 10 mg tablet, Take 10 mg by mouth, Disp: , Rfl:     cephalexin (KEFLEX) 500 mg capsule, Take 1 capsule (500 mg total) by mouth every 12 (twelve) hours for 7 days, Disp: 14 capsule, Rfl: 0    hydrOXYzine pamoate (VISTARIL) 25 mg capsule, TAKE ONE CAPSULE BY MOUTH THREE TIMES A DAY AS NEEDED FOR ANXIETY, Disp: , Rfl:     lidocaine (XYLOCAINE) 2 % topical gel, Apply q 2 hours PRN for pain (Patient not taking: Reported on 2022), Disp: 30 mL, Rfl: 0    meloxicam (MOBIC) 15 mg tablet, Daily (Patient not taking: Reported on 2022), Disp: , Rfl:     norethindrone (Ct) 0.35 MG tablet, Take 1 tablet (0.35 mg total) by mouth daily, Disp: 28 tablet, Rfl: 12    predniSONE 20 mg tablet, Take 3 tabs all at once daily for 5 days then 2 tabs for 2 days then 1 tab for 2 days. (Patient not taking: Reported on 2023), Disp: 21 tablet, Rfl: 0    Current Allergies     Allergies as of 2023 - Reviewed 2023   Allergen Reaction Noted    Doxycycline Other (See Comments) 2023    Poison ivy extract Rash 2015            The following portions of the patient's history were reviewed and updated as appropriate: allergies, current medications, past family history, past medical history, past social history, past surgical history and problem list.     Past Medical History:   Diagnosis Date    Depression     Hypertension     Varicella        Past Surgical History:   Procedure Laterality Date     SECTION      MYRINGOTOMY W/ TUBES      [place]  DELIVERY ONLY N/A 2018    Procedure:  SECTION ();   Surgeon: Natasha Garcias MD;  Location: [facility] ;  Service: Obstetrics       Family History   Problem Relation Age of Onset    Heart disease Father     Hypertension Father     Spina bifida Cousin     No Known Problems Mother     No Known Problems Brother     No Known Problems Daughter     No Known Problems Son     Breast cancer Neg Hx     Colon cancer Neg Hx     Ovarian cancer Neg Hx     Cervical cancer Neg Hx     Uterine cancer Neg Hx          Medications have been verified. Objective   BP (!) 151/103   Pulse 92   Temp 98.2 °F (36.8 °C) (Temporal)   Resp 18   Ht 5' 3" (1.6 m)   Wt 71.2 kg (157 lb)   LMP 11/01/2023 (Approximate)   SpO2 97%   BMI 27.81 kg/m²        Physical Exam     Physical Exam  Vitals and nursing note reviewed. Constitutional:       Appearance: Normal appearance. HENT:      Head: Normocephalic. Nose: Nose normal.   Eyes:      Conjunctiva/sclera: Conjunctivae normal.      Pupils: Pupils are equal, round, and reactive to light. Cardiovascular:      Rate and Rhythm: Normal rate. Pulmonary:      Effort: Pulmonary effort is normal.   Abdominal:      General: Abdomen is flat. Palpations: Abdomen is soft. Tenderness: There is no abdominal tenderness. There is no right CVA tenderness or left CVA tenderness. Musculoskeletal:         General: Normal range of motion. Cervical back: Normal range of motion. Skin:     General: Skin is warm and dry. Capillary Refill: Capillary refill takes less than 2 seconds. Neurological:      Mental Status: She is alert and oriented to person, place, and time.    Psychiatric:         Mood and Affect: Mood normal.         Behavior: Behavior normal.

## 2023-11-14 ENCOUNTER — TELEPHONE (OUTPATIENT)
Dept: URGENT CARE | Facility: CLINIC | Age: 33
End: 2023-11-14

## 2023-11-14 LAB — BACTERIA UR CULT: NORMAL

## 2023-11-14 NOTE — TELEPHONE ENCOUNTER
I called and left a message on the patient's voicemail regarding her urine culture being negative. I suggested that if her symptoms continue or worsen at any time that she should seek prompt follow-up as something else other than a UTI could be the cause of her symptoms.

## 2023-12-20 ENCOUNTER — OFFICE VISIT (OUTPATIENT)
Dept: URGENT CARE | Facility: MEDICAL CENTER | Age: 33
End: 2023-12-20
Payer: COMMERCIAL

## 2023-12-20 VITALS
WEIGHT: 157 LBS | BODY MASS INDEX: 27.82 KG/M2 | TEMPERATURE: 98 F | RESPIRATION RATE: 18 BRPM | HEART RATE: 90 BPM | OXYGEN SATURATION: 99 % | HEIGHT: 63 IN

## 2023-12-20 DIAGNOSIS — N89.8 VAGINAL ITCHING: Primary | ICD-10-CM

## 2023-12-20 DIAGNOSIS — N76.1 CHRONIC VAGINITIS: ICD-10-CM

## 2023-12-20 LAB
SL AMB  POCT GLUCOSE, UA: ABNORMAL
SL AMB LEUKOCYTE ESTERASE,UA: ABNORMAL
SL AMB POCT BILIRUBIN,UA: ABNORMAL
SL AMB POCT BLOOD,UA: ABNORMAL
SL AMB POCT CLARITY,UA: ABNORMAL
SL AMB POCT COLOR,UA: YELLOW
SL AMB POCT KETONES,UA: 40
SL AMB POCT NITRITE,UA: ABNORMAL
SL AMB POCT PH,UA: 7.5
SL AMB POCT SPECIFIC GRAVITY,UA: 1.01
SL AMB POCT URINE PROTEIN: ABNORMAL
SL AMB POCT UROBILINOGEN: 0.2

## 2023-12-20 PROCEDURE — S9088 SERVICES PROVIDED IN URGENT: HCPCS | Performed by: PHYSICIAN ASSISTANT

## 2023-12-20 PROCEDURE — 99213 OFFICE O/P EST LOW 20 MIN: CPT | Performed by: PHYSICIAN ASSISTANT

## 2023-12-20 PROCEDURE — 81002 URINALYSIS NONAUTO W/O SCOPE: CPT | Performed by: PHYSICIAN ASSISTANT

## 2023-12-20 PROCEDURE — 87086 URINE CULTURE/COLONY COUNT: CPT | Performed by: PHYSICIAN ASSISTANT

## 2023-12-20 RX ORDER — FLUCONAZOLE 150 MG/1
150 TABLET ORAL ONCE
Qty: 1 TABLET | Refills: 0 | Status: SHIPPED | OUTPATIENT
Start: 2023-12-20 | End: 2023-12-20

## 2023-12-20 NOTE — PROGRESS NOTES
[facility]        NAME: Brianna Mata is a 32 y.o. female  : 1990    MRN: [id number]  DATE: 2023  TIME: 5:27 PM    Assessment and Plan   Vaginal itching [N89.8]  1. Vaginal itching  Urine culture    Urine culture      2. Chronic vaginitis  POCT urine dip            Patient Instructions     Vaginitis  Fluconazole as directed  Follow up with PCP in 3-5 days.  Proceed to  ER if symptoms worsen.    Chief Complaint     Chief Complaint   Patient presents with    Vaginal Itching     Vaginal itching and discharge; states she does not like vaginal cream and prefers pills for the iching; denies urinary urgency, pressure, frequency          History of Present Illness       32-year-old female who presents complaining of vaginal itching and discharge x 3 days.  States that she has had similar episodes in the past with yeast infections.  Last menstrual period was 2023.  States she is not breast-feeding, not pregnant.  Denies urinary symptoms, abdominal pain    Vaginal Itching  The patient's primary symptoms include vaginal discharge.       Review of Systems   Review of Systems   Constitutional: Negative.    HENT: Negative.     Eyes: Negative.    Respiratory: Negative.  Negative for cough, chest tightness, shortness of breath, wheezing and stridor.    Cardiovascular: Negative.  Negative for chest pain, palpitations and leg swelling.   Genitourinary:  Positive for vaginal discharge.         Current Medications       Current Outpatient Medications:     albuterol (PROVENTIL HFA,VENTOLIN HFA) 90 mcg/act inhaler, Two puffs every four hours as needed for wheezing, Disp: , Rfl:     buPROPion (WELLBUTRIN XL) 300 mg 24 hr tablet, Take 300 mg by mouth daily, Disp: , Rfl:     cetirizine (ZyrTEC) 10 mg tablet, Take 10 mg by mouth, Disp: , Rfl:     fluticasone (FLONASE) 50 mcg/act nasal spray, 2 sprays into each nostril, Disp: , Rfl:     hydrOXYzine HCL (ATARAX) 10 mg tablet, Take 10 mg by mouth,  Disp: , Rfl:     hydrOXYzine pamoate (VISTARIL) 25 mg capsule, TAKE ONE CAPSULE BY MOUTH THREE TIMES A DAY AS NEEDED FOR ANXIETY, Disp: , Rfl:     lidocaine (XYLOCAINE) 2 % topical gel, Apply q 2 hours PRN for pain (Patient not taking: Reported on 2022), Disp: 30 mL, Rfl: 0    meloxicam (MOBIC) 15 mg tablet, Daily (Patient not taking: Reported on 2022), Disp: , Rfl:     montelukast (SINGULAIR) 10 mg tablet, Take 10 mg by mouth, Disp: , Rfl:     norethindrone (Ct) 0.35 MG tablet, Take 1 tablet (0.35 mg total) by mouth daily, Disp: 28 tablet, Rfl: 12    predniSONE 20 mg tablet, Take 3 tabs all at once daily for 5 days then 2 tabs for 2 days then 1 tab for 2 days. (Patient not taking: Reported on 2023), Disp: 21 tablet, Rfl: 0    Current Allergies     Allergies as of 2023 - Reviewed 2023   Allergen Reaction Noted    Doxycycline Other (See Comments) 2023    Poison ivy extract Rash 2015            The following portions of the patient's history were reviewed and updated as appropriate: allergies, current medications, past family history, past medical history, past social history, past surgical history and problem list.     Past Medical History:   Diagnosis Date    Depression     Hypertension     Varicella        Past Surgical History:   Procedure Laterality Date     SECTION      MYRINGOTOMY W/ TUBES      [place]  DELIVERY ONLY N/A 2018    Procedure:  SECTION ();  Surgeon: Gaviota Trevino MD;  Location: [facility] ;  Service: Obstetrics       Family History   Problem Relation Age of Onset    Heart disease Father     Hypertension Father     Spina bifida Cousin     No Known Problems Mother     No Known Problems Brother     No Known Problems Daughter     No Known Problems Son     Breast cancer Neg Hx     Colon cancer Neg Hx     Ovarian cancer Neg Hx     Cervical cancer Neg Hx     Uterine cancer Neg Hx          Medications have been  "verified.        Objective   Pulse 90   Temp 98 °F (36.7 °C)   Resp 18   Ht 5' 3\" (1.6 m)   Wt 71.2 kg (157 lb)   SpO2 99%   BMI 27.81 kg/m²        Physical Exam     Physical Exam  Constitutional:       Appearance: She is well-developed.   HENT:      Head: Normocephalic and atraumatic.      Right Ear: External ear normal.      Left Ear: External ear normal.      Nose: Nose normal.      Mouth/Throat:      Pharynx: No oropharyngeal exudate.   Cardiovascular:      Rate and Rhythm: Normal rate and regular rhythm.      Heart sounds: Normal heart sounds.   Pulmonary:      Effort: Pulmonary effort is normal. No respiratory distress.      Breath sounds: Normal breath sounds. No wheezing or rales.   Chest:      Chest wall: No tenderness.   Abdominal:      General: Bowel sounds are normal. There is no distension.      Palpations: Abdomen is soft. There is no mass.      Tenderness: There is no abdominal tenderness. There is no right CVA tenderness, left CVA tenderness, guarding or rebound.   Musculoskeletal:      Cervical back: Normal range of motion and neck supple.   Lymphadenopathy:      Cervical: No cervical adenopathy.                   "

## 2023-12-20 NOTE — PATIENT INSTRUCTIONS
Vaginitis  Fluconazole as directed  Follow up with PCP in 3-5 days.  Proceed to  ER if symptoms worsen.

## 2023-12-21 LAB — BACTERIA UR CULT: NORMAL

## 2024-01-11 ENCOUNTER — OFFICE VISIT (OUTPATIENT)
Dept: FAMILY MEDICINE CLINIC | Facility: CLINIC | Age: 34
End: 2024-01-11
Payer: COMMERCIAL

## 2024-01-11 VITALS
WEIGHT: 158.2 LBS | TEMPERATURE: 98.2 F | SYSTOLIC BLOOD PRESSURE: 114 MMHG | HEART RATE: 112 BPM | DIASTOLIC BLOOD PRESSURE: 84 MMHG | HEIGHT: 63 IN | BODY MASS INDEX: 28.03 KG/M2 | OXYGEN SATURATION: 98 %

## 2024-01-11 DIAGNOSIS — F41.9 ANXIETY: ICD-10-CM

## 2024-01-11 DIAGNOSIS — J30.89 ENVIRONMENTAL AND SEASONAL ALLERGIES: ICD-10-CM

## 2024-01-11 DIAGNOSIS — F31.31 BIPOLAR AFFECTIVE DISORDER, CURRENTLY DEPRESSED, MILD (HCC): Primary | ICD-10-CM

## 2024-01-11 DIAGNOSIS — S89.90XS KNEE INJURY, UNSPECIFIED LATERALITY, SEQUELA: ICD-10-CM

## 2024-01-11 DIAGNOSIS — Z91.411: ICD-10-CM

## 2024-01-11 PROBLEM — I62.9 INTRACRANIAL HEMORRHAGE (HCC): Status: RESOLVED | Noted: 2019-08-13 | Resolved: 2024-01-11

## 2024-01-11 PROCEDURE — 99204 OFFICE O/P NEW MOD 45 MIN: CPT | Performed by: FAMILY MEDICINE

## 2024-01-11 RX ORDER — HYDROXYZINE PAMOATE 25 MG/1
25 CAPSULE ORAL 3 TIMES DAILY PRN
Qty: 90 CAPSULE | Refills: 1 | Status: SHIPPED | OUTPATIENT
Start: 2024-01-11

## 2024-01-11 RX ORDER — BUPROPION HYDROCHLORIDE 300 MG/1
300 TABLET ORAL DAILY
Qty: 30 TABLET | Refills: 5 | Status: SHIPPED | OUTPATIENT
Start: 2024-01-11

## 2024-01-11 RX ORDER — METHOCARBAMOL 500 MG/1
500 TABLET, FILM COATED ORAL 3 TIMES DAILY PRN
Qty: 30 TABLET | Refills: 0 | Status: SHIPPED | OUTPATIENT
Start: 2024-01-11

## 2024-01-11 RX ORDER — ALBUTEROL SULFATE 90 UG/1
2 AEROSOL, METERED RESPIRATORY (INHALATION) EVERY 6 HOURS PRN
Qty: 8.5 G | Refills: 1 | Status: SHIPPED | OUTPATIENT
Start: 2024-01-11

## 2024-01-11 RX ORDER — LAMOTRIGINE 25 MG/1
25 TABLET ORAL 2 TIMES DAILY
Qty: 60 TABLET | Refills: 0 | Status: SHIPPED | OUTPATIENT
Start: 2024-01-11
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
## 2024-01-11 NOTE — PATIENT INSTRUCTIONS
Start lamictal to help stabilize mood lability from bipolar. Can take 25mg once a day for a week and then increase to twice a day.   Continue wellbutrin at 300  Increase the hydroxyzine to 25mg three times a day as needed.   Consider adding sertraline for PTSD component, though anxiety may be fixed by lamictal.

## 2024-01-12 ENCOUNTER — OFFICE VISIT (OUTPATIENT)
Dept: URGENT CARE | Facility: CLINIC | Age: 34
End: 2024-01-12
Payer: COMMERCIAL

## 2024-01-12 VITALS — TEMPERATURE: 98.4 F | OXYGEN SATURATION: 96 % | RESPIRATION RATE: 16 BRPM | HEART RATE: 89 BPM

## 2024-01-12 DIAGNOSIS — U07.1 COVID-19 VIRUS INFECTION: Primary | ICD-10-CM

## 2024-01-12 LAB
SARS-COV-2 AG UPPER RESP QL IA: POSITIVE
VALID CONTROL: ABNORMAL

## 2024-01-12 PROCEDURE — 87811 SARS-COV-2 COVID19 W/OPTIC: CPT | Performed by: STUDENT IN AN ORGANIZED HEALTH CARE EDUCATION/TRAINING PROGRAM

## 2024-01-12 PROCEDURE — S9088 SERVICES PROVIDED IN URGENT: HCPCS | Performed by: STUDENT IN AN ORGANIZED HEALTH CARE EDUCATION/TRAINING PROGRAM

## 2024-01-12 PROCEDURE — 99213 OFFICE O/P EST LOW 20 MIN: CPT | Performed by: STUDENT IN AN ORGANIZED HEALTH CARE EDUCATION/TRAINING PROGRAM

## 2024-01-12 NOTE — LETTER
January 12, 2024    Patient: Brianna Mata  YOB: 1990  Date of Last Encounter: Visit date not found      To whom it may concern:     Brianna Mata has tested positive for COVID-19 (Coronavirus). She may return to work on 1/15/24, which is 5 days from illness onset (provided symptoms are improving) and 24 hours without fever. She should strictly mask for 5 additional days after.     Sincerely,         Harini Glover DO

## 2024-01-12 NOTE — PROGRESS NOTES
[facility]        NAME: Brianna Mata is a 33 y.o. female  : 1990    MRN: [id number]    Assessment and Plan   COVID-19 virus infection [U07.1]  1. COVID-19 virus infection  Poct Covid 19 Rapid Antigen Test          Results for orders placed or performed in visit on 24   Poct Covid 19 Rapid Antigen Test   Result Value Ref Range    POCT SARS-CoV-2 Ag Positive (A) Negative    VALID CONTROL Valid      Mild severity of covid 19 infection. Discussed isolation and masking guidelines. Do not recommend paxlovid as risks outweight benefits.     Patient Instructions     See wrap up for details  Follow up with PCP in 3-5 days.  Proceed to  ER if symptoms worsen.    Chief Complaint     Chief Complaint   Patient presents with    sinus congestion     Verbalizes children have COVID          History of Present Illness       HPI    Today is day 3 of symptoms  Reports nasal congestion, postnasal drip, productive cough  Denies fever, chills, nausea, vomiting, myalgias, ear pain, sore throat  Has not tried any otc  Reports children are positive for covid     Review of Systems   Review of Systems   Constitutional:  Negative for chills and fever.   HENT:  Positive for congestion and postnasal drip. Negative for ear pain and sore throat.    Eyes:  Negative for pain and visual disturbance.   Respiratory:  Positive for cough. Negative for chest tightness and shortness of breath.    Cardiovascular:  Negative for chest pain and palpitations.   Gastrointestinal:  Negative for abdominal pain, constipation, diarrhea, nausea and vomiting.   Genitourinary:  Negative for dysuria, hematuria and menstrual problem.   Musculoskeletal:  Negative for arthralgias and back pain.   Skin:  Negative for color change and rash.   Neurological:  Negative for seizures and syncope.   Psychiatric/Behavioral:  Negative for dysphoric mood and suicidal ideas.    All other systems reviewed and are negative.    Current Medications       Current  Outpatient Medications:     albuterol (PROVENTIL HFA,VENTOLIN HFA) 90 mcg/act inhaler, Inhale 2 puffs every 6 (six) hours as needed for wheezing, Disp: 8.5 g, Rfl: 1    buPROPion (WELLBUTRIN XL) 300 mg 24 hr tablet, Take 1 tablet (300 mg total) by mouth daily, Disp: 30 tablet, Rfl: 5    fluticasone (FLONASE) 50 mcg/act nasal spray, 2 sprays into each nostril, Disp: , Rfl:     hydrOXYzine pamoate (VISTARIL) 25 mg capsule, Take 1 capsule (25 mg total) by mouth 3 (three) times a day as needed for anxiety, Disp: 90 capsule, Rfl: 1    lamoTRIgine (LaMICtal) 25 mg tablet, Take 1 tablet (25 mg total) by mouth 2 (two) times a day, Disp: 60 tablet, Rfl: 0    methocarbamol (ROBAXIN) 500 mg tablet, Take 1 tablet (500 mg total) by mouth 3 (three) times a day as needed for muscle spasms, Disp: 30 tablet, Rfl: 0    norethindrone (Ct) 0.35 MG tablet, Take 1 tablet (0.35 mg total) by mouth daily, Disp: 28 tablet, Rfl: 12    Current Allergies     Allergies as of 2024 - Reviewed 2024   Allergen Reaction Noted    Doxycycline Other (See Comments) 2023    Poison ivy extract Rash 2015            The following portions of the patient's history were reviewed and updated as appropriate: allergies, current medications, past family history, past medical history, past social history, past surgical history and problem list.     Past Medical History:   Diagnosis Date    Depression     Hypertension     Intracranial hemorrhage (HCC) 2019    Varicella        Past Surgical History:   Procedure Laterality Date     SECTION      MYRINGOTOMY W/ TUBES      [place]  DELIVERY ONLY N/A 2018    Procedure:  SECTION ();  Surgeon: Gaviota Trevino MD;  Location: [facility];  Service: Obstetrics       Family History   Problem Relation Age of Onset    Heart disease Father     Hypertension Father     Spina bifida Cousin     No Known Problems Mother     No Known Problems Brother     No Known  Problems Daughter     No Known Problems Son     Breast cancer Neg Hx     Colon cancer Neg Hx     Ovarian cancer Neg Hx     Cervical cancer Neg Hx     Uterine cancer Neg Hx          Medications have been verified.        Objective   Pulse 89   Temp 98.4 °F (36.9 °C)   Resp 16   SpO2 96%        Physical Exam     Physical Exam  Constitutional:       General: She is not in acute distress.     Appearance: Normal appearance.   HENT:      Head: Normocephalic and atraumatic.      Right Ear: External ear normal.      Left Ear: External ear normal.      Nose: Congestion present.      Mouth/Throat:      Mouth: Mucous membranes are moist.      Pharynx: Oropharynx is clear. Posterior oropharyngeal erythema (minimal) present.   Eyes:      Extraocular Movements: Extraocular movements intact.      Conjunctiva/sclera: Conjunctivae normal.   Cardiovascular:      Rate and Rhythm: Normal rate and regular rhythm.   Pulmonary:      Effort: Pulmonary effort is normal. No respiratory distress.      Breath sounds: Normal breath sounds.   Musculoskeletal:      Cervical back: Normal range of motion.   Skin:     General: Skin is warm and dry.   Neurological:      General: No focal deficit present.      Mental Status: She is alert and oriented to person, place, and time.   Psychiatric:         Mood and Affect: Mood normal.         Behavior: Behavior normal.

## 2024-01-12 NOTE — PATIENT INSTRUCTIONS
Continue with at home Tylenol/ibuprofen as needed for pain/fever.  Make sure to stay well-hydrated and rest.  Simple diet of bananas, rice, applesauce, toast, crackers-until normal appetite resumes.  Multivitamin.  Vitamin D3 2000 IU daily.  Vitamin C 1000 mg twice daily.  Zinc 50mg daily.  Follow-up with family medicine referral as discussed.  Follow up with PCP in 3-5 days.  Proceed to  ER if symptoms worsen.

## 2024-01-26 ENCOUNTER — OFFICE VISIT (OUTPATIENT)
Dept: URGENT CARE | Facility: CLINIC | Age: 34
End: 2024-01-26
Payer: COMMERCIAL

## 2024-01-26 VITALS
OXYGEN SATURATION: 99 % | HEART RATE: 88 BPM | SYSTOLIC BLOOD PRESSURE: 136 MMHG | DIASTOLIC BLOOD PRESSURE: 89 MMHG | TEMPERATURE: 98.2 F

## 2024-01-26 DIAGNOSIS — B37.31 VAGINAL YEAST INFECTION: Primary | ICD-10-CM

## 2024-01-26 PROCEDURE — S9088 SERVICES PROVIDED IN URGENT: HCPCS | Performed by: ORTHOPAEDIC SURGERY

## 2024-01-26 PROCEDURE — 99213 OFFICE O/P EST LOW 20 MIN: CPT | Performed by: ORTHOPAEDIC SURGERY

## 2024-01-26 RX ORDER — FLUCONAZOLE 150 MG/1
150 TABLET ORAL ONCE
Qty: 1 TABLET | Refills: 1 | Status: SHIPPED | OUTPATIENT
Start: 2024-01-26 | End: 2024-01-26

## 2024-01-26 NOTE — PATIENT INSTRUCTIONS
Take diflucan as prescribed   Avoid lotions, soaps, washes, douches  Follow up with PCP/GYN in 3-5 days  Proceed to the ED if symptoms worsen

## 2024-01-26 NOTE — PROGRESS NOTES
[facility]        NAME: Brianna Mata is a 33 y.o. female  : 1990    MRN: [id number]  DATE: 2024  TIME: 4:11 PM    Assessment and Plan   Vaginal yeast infection [B37.31]  1. Vaginal yeast infection  fluconazole (DIFLUCAN) 150 mg tablet            Patient Instructions     Take diflucan as prescribed   Avoid lotions, soaps, washes, douches  Follow up with PCP/GYN in 3-5 days  Proceed to the ED if symptoms worsen    Chief Complaint     Chief Complaint   Patient presents with    Vaginitis     Pt has yeast infection for 3 weeks. Pt said that there is lot of itching and burning in her vaginal area. Pt has tried a few home remedies but didn't work. Pt also mentioned that she is on her period right now.         History of Present Illness       33 YOF presents to the urgent care for evaluation of a yeast infection. The patient states she gets vaginal yeast infections often, and has tried OTC relief in the past without success. The patient complains of itching and vaginal burning that has been ongoing for about three weeks. She denies any fevers, chills, urinary frequency, urgency. She denies noticing any blood in her urine, though notes currently she is on her period. The patient denies any rashes or unusual discharge. She has not tried anything for symptom relief.         Review of Systems   Review of Systems   Constitutional:  Negative for chills and fever.   HENT:  Negative for ear pain and sore throat.    Eyes:  Negative for pain and visual disturbance.   Respiratory:  Negative for cough and shortness of breath.    Cardiovascular:  Negative for chest pain and palpitations.   Gastrointestinal:  Negative for abdominal pain and vomiting.   Genitourinary:  Positive for vaginal pain (vaginal itching and burning). Negative for dysuria, flank pain, frequency, hematuria, urgency and vaginal discharge.   Musculoskeletal:  Negative for arthralgias and back pain.   Skin:  Negative for color change  and rash.   Neurological:  Negative for dizziness, seizures, syncope and headaches.   Psychiatric/Behavioral: Negative.     All other systems reviewed and are negative.        Current Medications       Current Outpatient Medications:     albuterol (PROVENTIL HFA,VENTOLIN HFA) 90 mcg/act inhaler, Inhale 2 puffs every 6 (six) hours as needed for wheezing, Disp: 8.5 g, Rfl: 1    buPROPion (WELLBUTRIN XL) 300 mg 24 hr tablet, Take 1 tablet (300 mg total) by mouth daily, Disp: 30 tablet, Rfl: 5    fluconazole (DIFLUCAN) 150 mg tablet, Take 1 tablet (150 mg total) by mouth once for 1 dose, Disp: 1 tablet, Rfl: 1    fluticasone (FLONASE) 50 mcg/act nasal spray, 2 sprays into each nostril, Disp: , Rfl:     hydrOXYzine pamoate (VISTARIL) 25 mg capsule, Take 1 capsule (25 mg total) by mouth 3 (three) times a day as needed for anxiety, Disp: 90 capsule, Rfl: 1    lamoTRIgine (LaMICtal) 25 mg tablet, Take 1 tablet (25 mg total) by mouth 2 (two) times a day, Disp: 60 tablet, Rfl: 0    methocarbamol (ROBAXIN) 500 mg tablet, Take 1 tablet (500 mg total) by mouth 3 (three) times a day as needed for muscle spasms, Disp: 30 tablet, Rfl: 0    norethindrone (Ct) 0.35 MG tablet, Take 1 tablet (0.35 mg total) by mouth daily, Disp: 28 tablet, Rfl: 12    Current Allergies     Allergies as of 2024 - Reviewed 2024   Allergen Reaction Noted    Doxycycline Other (See Comments) 2023    Poison ivy extract Rash 2015            The following portions of the patient's history were reviewed and updated as appropriate: allergies, current medications, past family history, past medical history, past social history, past surgical history and problem list.     Past Medical History:   Diagnosis Date    Depression     Hypertension     Intracranial hemorrhage (HCC) 2019    Varicella        Past Surgical History:   Procedure Laterality Date     SECTION      MYRINGOTOMY W/ TUBES      [place]  DELIVERY ONLY N/A  2018    Procedure:  SECTION ();  Surgeon: Gaviota Trevino MD;  Location: [facility];  Service: Obstetrics       Family History   Problem Relation Age of Onset    Heart disease Father     Hypertension Father     Spina bifida Cousin     No Known Problems Mother     No Known Problems Brother     No Known Problems Daughter     No Known Problems Son     Breast cancer Neg Hx     Colon cancer Neg Hx     Ovarian cancer Neg Hx     Cervical cancer Neg Hx     Uterine cancer Neg Hx          Medications have been verified.        Objective   /89 (BP Location: Left arm)   Pulse 88   Temp 98.2 °F (36.8 °C)   SpO2 99%        Physical Exam     Physical Exam  Vitals and nursing note reviewed.   Constitutional:       General: She is not in acute distress.     Appearance: Normal appearance. She is not ill-appearing.   HENT:      Head: Normocephalic and atraumatic.      Nose: Nose normal.      Mouth/Throat:      Pharynx: Oropharynx is clear.   Eyes:      Extraocular Movements: Extraocular movements intact.      Pupils: Pupils are equal, round, and reactive to light.   Cardiovascular:      Rate and Rhythm: Normal rate and regular rhythm.      Pulses: Normal pulses.      Heart sounds: Normal heart sounds.   Pulmonary:      Effort: Pulmonary effort is normal. No respiratory distress.      Breath sounds: Normal breath sounds. No wheezing or rhonchi.   Abdominal:      Palpations: Abdomen is soft.      Tenderness: There is no right CVA tenderness or left CVA tenderness.   Musculoskeletal:      Cervical back: Normal range of motion.   Skin:     General: Skin is warm and dry.      Capillary Refill: Capillary refill takes less than 2 seconds.   Neurological:      General: No focal deficit present.      Mental Status: She is alert and oriented to person, place, and time.   Psychiatric:         Mood and Affect: Mood normal.         Behavior: Behavior normal.

## 2024-02-09 ENCOUNTER — OFFICE VISIT (OUTPATIENT)
Dept: URGENT CARE | Facility: MEDICAL CENTER | Age: 34
End: 2024-02-09
Payer: COMMERCIAL

## 2024-02-09 VITALS
BODY MASS INDEX: 27.82 KG/M2 | HEIGHT: 63 IN | OXYGEN SATURATION: 100 % | DIASTOLIC BLOOD PRESSURE: 97 MMHG | RESPIRATION RATE: 18 BRPM | WEIGHT: 157 LBS | TEMPERATURE: 98.3 F | HEART RATE: 96 BPM | SYSTOLIC BLOOD PRESSURE: 157 MMHG

## 2024-02-09 DIAGNOSIS — J01.40 ACUTE NON-RECURRENT PANSINUSITIS: Primary | ICD-10-CM

## 2024-02-09 PROCEDURE — S9088 SERVICES PROVIDED IN URGENT: HCPCS

## 2024-02-09 PROCEDURE — 99213 OFFICE O/P EST LOW 20 MIN: CPT

## 2024-02-09 RX ORDER — AMOXICILLIN AND CLAVULANATE POTASSIUM 875; 125 MG/1; MG/1
1 TABLET, FILM COATED ORAL EVERY 12 HOURS SCHEDULED
Qty: 14 TABLET | Refills: 0 | Status: SHIPPED | OUTPATIENT
Start: 2024-02-09 | End: 2024-02-16

## 2024-02-09 NOTE — PROGRESS NOTES
[facility]        NAME: Brianna Mata is a 33 y.o. female  : 1990    MRN: [id number]  DATE: 2024  TIME: 10:08 PM    Assessment and Plan   Acute non-recurrent pansinusitis [J01.40]  1. Acute non-recurrent pansinusitis  amoxicillin-clavulanate (AUGMENTIN) 875-125 mg per tablet            Patient Instructions   Reassurance provided that Brianna Mata lungs are clear on examination today.    Majority of cases are viral and will not require antibiotic treatment. Given length of symptoms will treat.     Take antibiotics as prescribed. Complete entire course of antibiotics even if feeling better.     Eat yogurt with live and active cultures and/or take a probiotic at least 3 hours before or after antibiotic dose. Monitor stool for diarrhea and/or blood. If this occurs, contact primary care doctor ASAP.     Take over the counter Mucinex during the day (Blue Box)    Recommend the following options: room humidifier, vicks vapo rub, hot/steamy shower (practice proper safety precautions when handing hot liquids/steam)  Offer fluids frequently to help with hydration, as staying hydrated helps loosen up thick mucous.   May drink warm water with honey. May use lemon.    Warm compresses over sinuses  Over the counter saline nasal spray    Tylenol/Ibuprofen for pain/fever.    Encourage coughing into the elbow instead of the hand.   Washing hands frequently with warm water and soap may help stop spread of infection.    If symptoms do not improve, please follow with PCP in 3-5 days for further evaluation.    Proceed to ER if you become acutely short of breath, you have any difficulty breathing, you develop chest pain or any worsening symptoms.    Chief Complaint     Chief Complaint   Patient presents with    Sinus Problem     Pt. With sinus pressure, congestion, runny nose for the past 6 weeks.      History of Present Illness       Sinusitis  This is a new problem. The current episode started more  than 1 month ago. The problem is unchanged. There has been no fever. Associated symptoms include congestion, headaches and sinus pressure. Pertinent negatives include no chills, coughing, diaphoresis, ear pain, hoarse voice, shortness of breath, sneezing, sore throat or swollen glands. Past treatments include nothing.       Review of Systems   Review of Systems   Constitutional:  Negative for chills, diaphoresis, fatigue and fever.   HENT:  Positive for congestion, postnasal drip, rhinorrhea and sinus pressure. Negative for ear discharge, ear pain, hoarse voice, sneezing and sore throat.    Respiratory: Negative.  Negative for cough, shortness of breath and wheezing.    Cardiovascular: Negative.  Negative for chest pain and palpitations.   Gastrointestinal:  Negative for abdominal pain, constipation, diarrhea, nausea and vomiting.   Musculoskeletal:  Negative for myalgias.   Skin:  Negative for color change and wound.   Neurological:  Positive for headaches.     Current Medications       Current Outpatient Medications:     albuterol (PROVENTIL HFA,VENTOLIN HFA) 90 mcg/act inhaler, Inhale 2 puffs every 6 (six) hours as needed for wheezing, Disp: 8.5 g, Rfl: 1    amoxicillin-clavulanate (AUGMENTIN) 875-125 mg per tablet, Take 1 tablet by mouth every 12 (twelve) hours for 7 days, Disp: 14 tablet, Rfl: 0    buPROPion (WELLBUTRIN XL) 300 mg 24 hr tablet, Take 1 tablet (300 mg total) by mouth daily, Disp: 30 tablet, Rfl: 5    fluticasone (FLONASE) 50 mcg/act nasal spray, 2 sprays into each nostril, Disp: , Rfl:     hydrOXYzine pamoate (VISTARIL) 25 mg capsule, Take 1 capsule (25 mg total) by mouth 3 (three) times a day as needed for anxiety, Disp: 90 capsule, Rfl: 1    lamoTRIgine (LaMICtal) 25 mg tablet, Take 1 tablet (25 mg total) by mouth 2 (two) times a day, Disp: 60 tablet, Rfl: 0    methocarbamol (ROBAXIN) 500 mg tablet, Take 1 tablet (500 mg total) by mouth 3 (three) times a day as needed for muscle spasms, Disp:  "30 tablet, Rfl: 0    norethindrone (Ct) 0.35 MG tablet, Take 1 tablet (0.35 mg total) by mouth daily, Disp: 28 tablet, Rfl: 12    Current Allergies     Allergies as of 2024 - Reviewed 2024   Allergen Reaction Noted    Doxycycline Other (See Comments) 2023    Poison ivy extract Rash 2015            The following portions of the patient's history were reviewed and updated as appropriate: allergies, current medications, past family history, past medical history, past social history, past surgical history and problem list.     Past Medical History:   Diagnosis Date    Depression     Hypertension     Intracranial hemorrhage (HCC) 2019    Varicella        Past Surgical History:   Procedure Laterality Date     SECTION      MYRINGOTOMY W/ TUBES      [place]  DELIVERY ONLY N/A 2018    Procedure:  SECTION ();  Surgeon: Gaviota Trevino MD;  Location: [facility];  Service: Obstetrics       Family History   Problem Relation Age of Onset    Heart disease Father     Hypertension Father     Spina bifida Cousin     No Known Problems Mother     No Known Problems Brother     No Known Problems Daughter     No Known Problems Son     Breast cancer Neg Hx     Colon cancer Neg Hx     Ovarian cancer Neg Hx     Cervical cancer Neg Hx     Uterine cancer Neg Hx          Medications have been verified.        Objective   /97   Pulse 96   Temp 98.3 °F (36.8 °C)   Resp 18   Ht 5' 3\" (1.6 m)   Wt 71.2 kg (157 lb)   SpO2 100%   BMI 27.81 kg/m²        Physical Exam     Physical Exam  Vitals and nursing note reviewed.   Constitutional:       General: She is not in acute distress.     Appearance: Normal appearance. She is not ill-appearing, toxic-appearing or diaphoretic.   HENT:      Head: Normocephalic.      Right Ear: Tympanic membrane, ear canal and external ear normal. There is no impacted cerumen.      Left Ear: Tympanic membrane, ear canal and external ear normal. " There is no impacted cerumen.      Nose: Rhinorrhea present. No congestion. Rhinorrhea is clear.      Right Turbinates: Enlarged and swollen.      Left Turbinates: Enlarged and swollen.      Right Sinus: Maxillary sinus tenderness and frontal sinus tenderness present.      Left Sinus: Maxillary sinus tenderness and frontal sinus tenderness present.      Mouth/Throat:      Mouth: Mucous membranes are moist.      Pharynx: Posterior oropharyngeal erythema present.   Cardiovascular:      Rate and Rhythm: Normal rate and regular rhythm.      Pulses: Normal pulses.      Heart sounds: Normal heart sounds. No murmur heard.  Pulmonary:      Effort: Pulmonary effort is normal. No respiratory distress.      Breath sounds: Normal breath sounds. No stridor. No wheezing, rhonchi or rales.   Chest:      Chest wall: No tenderness.   Musculoskeletal:         General: Normal range of motion.   Lymphadenopathy:      Head:      Right side of head: Tonsillar adenopathy present.      Left side of head: Tonsillar adenopathy present.      Cervical: No cervical adenopathy.   Skin:     General: Skin is warm.   Neurological:      Mental Status: She is alert.

## 2024-02-10 NOTE — PATIENT INSTRUCTIONS
Reassurance provided that Brianna Mata lungs are clear on examination today.    Majority of cases are viral and will not require antibiotic treatment. Given length of symptoms will treat.     Take antibiotics as prescribed. Complete entire course of antibiotics even if feeling better.     Eat yogurt with live and active cultures and/or take a probiotic at least 3 hours before or after antibiotic dose. Monitor stool for diarrhea and/or blood. If this occurs, contact primary care doctor ASAP.     Take over the counter Mucinex during the day (Blue Box)    Recommend the following options: room humidifier, vicks vapo rub, hot/steamy shower (practice proper safety precautions when handing hot liquids/steam)  Offer fluids frequently to help with hydration, as staying hydrated helps loosen up thick mucous.   May drink warm water with honey. May use lemon.    Warm compresses over sinuses  Over the counter saline nasal spray    Tylenol/Ibuprofen for pain/fever.    Encourage coughing into the elbow instead of the hand.   Washing hands frequently with warm water and soap may help stop spread of infection.    If symptoms do not improve, please follow with PCP in 3-5 days for further evaluation.    Proceed to ER if you become acutely short of breath, you have any difficulty breathing, you develop chest pain or any worsening symptoms.

## 2024-02-21 ENCOUNTER — OFFICE VISIT (OUTPATIENT)
Dept: OBGYN CLINIC | Facility: MEDICAL CENTER | Age: 34
End: 2024-02-21
Payer: COMMERCIAL

## 2024-02-21 VITALS — SYSTOLIC BLOOD PRESSURE: 145 MMHG | HEART RATE: 75 BPM | DIASTOLIC BLOOD PRESSURE: 107 MMHG

## 2024-02-21 DIAGNOSIS — M25.561 CHRONIC PAIN OF RIGHT KNEE: ICD-10-CM

## 2024-02-21 DIAGNOSIS — G89.29 CHRONIC PAIN OF RIGHT KNEE: ICD-10-CM

## 2024-02-21 DIAGNOSIS — S89.90XS KNEE INJURY, UNSPECIFIED LATERALITY, SEQUELA: ICD-10-CM

## 2024-02-21 DIAGNOSIS — M22.2X2 PATELLOFEMORAL PAIN SYNDROME OF BOTH KNEES: Primary | ICD-10-CM

## 2024-02-21 DIAGNOSIS — M76.51 PATELLAR TENDINITIS OF RIGHT KNEE: ICD-10-CM

## 2024-02-21 DIAGNOSIS — M25.562 CHRONIC PAIN OF LEFT KNEE: ICD-10-CM

## 2024-02-21 DIAGNOSIS — G89.29 CHRONIC PAIN OF LEFT KNEE: ICD-10-CM

## 2024-02-21 DIAGNOSIS — M22.2X1 PATELLOFEMORAL PAIN SYNDROME OF BOTH KNEES: Primary | ICD-10-CM

## 2024-02-21 PROBLEM — N39.0 RECURRENT UTI: Status: RESOLVED | Noted: 2017-09-07 | Resolved: 2024-02-21

## 2024-02-21 PROCEDURE — 99204 OFFICE O/P NEW MOD 45 MIN: CPT | Performed by: PHYSICAL MEDICINE & REHABILITATION

## 2024-02-21 RX ORDER — FLUCONAZOLE 150 MG/1
150 TABLET ORAL ONCE
COMMUNITY
Start: 2024-01-26

## 2024-02-21 RX ORDER — MELOXICAM 15 MG/1
15 TABLET ORAL DAILY PRN
Qty: 60 TABLET | Refills: 0 | Status: SHIPPED | OUTPATIENT
Start: 2024-02-21
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
## 2024-03-19 DIAGNOSIS — Z30.9 ENCOUNTER FOR CONTRACEPTIVE MANAGEMENT, UNSPECIFIED TYPE: ICD-10-CM

## 2024-03-19 RX ORDER — ACETAMINOPHEN AND CODEINE PHOSPHATE 120; 12 MG/5ML; MG/5ML
1 SOLUTION ORAL DAILY
Qty: 28 TABLET | Refills: 1 | Status: SHIPPED | OUTPATIENT
Start: 2024-03-19

## 2024-03-20 NOTE — TELEPHONE ENCOUNTER
Refill authorized. BUT is overdue for YV. Please reach out to pt schedule YV  No further refills will be given until seen .

## 2024-04-08 ENCOUNTER — OFFICE VISIT (OUTPATIENT)
Dept: URGENT CARE | Facility: CLINIC | Age: 34
End: 2024-04-08
Payer: COMMERCIAL

## 2024-04-08 VITALS
DIASTOLIC BLOOD PRESSURE: 100 MMHG | OXYGEN SATURATION: 100 % | TEMPERATURE: 97.7 F | SYSTOLIC BLOOD PRESSURE: 133 MMHG | HEART RATE: 88 BPM | RESPIRATION RATE: 16 BRPM

## 2024-04-08 DIAGNOSIS — B37.31 VAGINAL YEAST INFECTION: Primary | ICD-10-CM

## 2024-04-08 DIAGNOSIS — N89.8 VAGINAL ITCHING: ICD-10-CM

## 2024-04-08 LAB
SL AMB  POCT GLUCOSE, UA: ABNORMAL
SL AMB LEUKOCYTE ESTERASE,UA: ABNORMAL
SL AMB POCT BILIRUBIN,UA: ABNORMAL
SL AMB POCT BLOOD,UA: ABNORMAL
SL AMB POCT CLARITY,UA: ABNORMAL
SL AMB POCT COLOR,UA: YELLOW
SL AMB POCT KETONES,UA: ABNORMAL
SL AMB POCT NITRITE,UA: ABNORMAL
SL AMB POCT PH,UA: 5
SL AMB POCT SPECIFIC GRAVITY,UA: 1.02
SL AMB POCT URINE HCG: NEGATIVE
SL AMB POCT URINE PROTEIN: ABNORMAL
SL AMB POCT UROBILINOGEN: 0.2

## 2024-04-08 PROCEDURE — S9088 SERVICES PROVIDED IN URGENT: HCPCS | Performed by: ORTHOPAEDIC SURGERY

## 2024-04-08 PROCEDURE — 81002 URINALYSIS NONAUTO W/O SCOPE: CPT | Performed by: ORTHOPAEDIC SURGERY

## 2024-04-08 PROCEDURE — 99213 OFFICE O/P EST LOW 20 MIN: CPT | Performed by: ORTHOPAEDIC SURGERY

## 2024-04-08 PROCEDURE — 81025 URINE PREGNANCY TEST: CPT | Performed by: ORTHOPAEDIC SURGERY

## 2024-04-08 RX ORDER — FLUCONAZOLE 150 MG/1
150 TABLET ORAL ONCE
Qty: 1 TABLET | Refills: 1 | Status: SHIPPED | OUTPATIENT
Start: 2024-04-08 | End: 2024-04-08

## 2024-04-08 NOTE — PROGRESS NOTES
[facility]        NAME: Brianna Mata is a 33 y.o. female  : 1990    MRN: [id number]  DATE: April 10, 2024  TIME: 11:41 AM    Assessment and Plan   Vaginal yeast infection [B37.31]  1. Vaginal yeast infection  fluconazole (DIFLUCAN) 150 mg tablet      2. Vaginal itching  POCT urine HCG    POCT urine dip        POCT urine dip negative for leukocytes, hematuria, nitrites, protein, glucose.   POCT HCG negative.     History and exam consistent with yeast infection. Provided patient with diflucan and refill. Patient will follow up with GYN.     Patient Instructions       Follow up with PCP in 3-5 days.  Proceed to  ER if symptoms worsen.    If tests are performed, our office will contact you with results only if changes need to made to the care plan discussed with you at the visit. [facility]    Chief Complaint     Chief Complaint   Patient presents with    Vaginitis     States she gets uti and yeast infections frequently . Symptoms started 3 weeks ago. States she waits till it get bad to come in. No interventions attempted as she states they don't work.          History of Present Illness       33 YOF presents to the urgent care for evaluation of vaginal itching and discharge. She states symptoms started three weeks ago. The patient has been seen here at the clinic before for similar symptoms. She states Diflucan completely resolved her symptoms then, and that she is known to have frequent yeast infections. The patient denies any abdominal pain, fevers, chills. She denies any urinary symptoms. Her discharge is described as clear to white in color. She denies any rashes or lesions. She denies any changes in sexual partners, soaps, detergents, clothing/underwear. She does not douche or use pH balance wipes. The patient notes lmp was the beginning of last month. She did test for pregnancy Thursday, which was negative.         Review of Systems   Review of  Systems   Constitutional:  Negative for chills and fever.   HENT:  Negative for ear pain and sore throat.    Eyes:  Negative for pain and visual disturbance.   Respiratory:  Negative for cough and shortness of breath.    Cardiovascular:  Negative for chest pain and palpitations.   Gastrointestinal:  Negative for abdominal pain, diarrhea, nausea and vomiting.   Genitourinary:  Positive for vaginal discharge. Negative for dysuria, frequency, hematuria, urgency and vaginal pain (itching).   Musculoskeletal:  Negative for arthralgias and back pain.   Skin:  Negative for color change and rash.   Neurological:  Negative for seizures and syncope.   All other systems reviewed and are negative.        Current Medications       Current Outpatient Medications:     buPROPion (WELLBUTRIN XL) 300 mg 24 hr tablet, Take 1 tablet (300 mg total) by mouth daily, Disp: 30 tablet, Rfl: 5    hydrOXYzine pamoate (VISTARIL) 25 mg capsule, Take 1 capsule (25 mg total) by mouth 3 (three) times a day as needed for anxiety, Disp: 90 capsule, Rfl: 1    norethindrone (MICRONOR) 0.35 MG tablet, TAKE 1 TABLET BY MOUTH EVERY DAY, Disp: 28 tablet, Rfl: 1    albuterol (PROVENTIL HFA,VENTOLIN HFA) 90 mcg/act inhaler, Inhale 2 puffs every 6 (six) hours as needed for wheezing (Patient not taking: Reported on 4/8/2024), Disp: 8.5 g, Rfl: 1    fluconazole (DIFLUCAN) 150 mg tablet, Take 150 mg by mouth once (Patient not taking: Reported on 4/8/2024), Disp: , Rfl:     fluticasone (FLONASE) 50 mcg/act nasal spray, 2 sprays into each nostril (Patient not taking: Reported on 4/8/2024), Disp: , Rfl:     meloxicam (MOBIC) 15 mg tablet, Take 1 tablet (15 mg total) by mouth daily as needed for moderate pain (Patient not taking: Reported on 4/8/2024), Disp: 60 tablet, Rfl: 0    methocarbamol (ROBAXIN) 500 mg tablet, Take 1 tablet (500 mg total) by mouth 3 (three) times a day as needed for muscle spasms (Patient not taking: Reported on 4/8/2024), Disp: 30 tablet,  Rfl: 0    Current Allergies     Allergies as of 2024 - Reviewed 2024   Allergen Reaction Noted    Doxycycline Other (See Comments) 2023    Poison ivy extract Rash 2015            The following portions of the patient's history were reviewed and updated as appropriate: allergies, current medications, past family history, past medical history, past social history, past surgical history and problem list.     Past Medical History:   Diagnosis Date    Depression     Hypertension     Intracranial hemorrhage (HCC) 2019    Varicella        Past Surgical History:   Procedure Laterality Date     SECTION      MYRINGOTOMY W/ TUBES      [place]  DELIVERY ONLY N/A 2018    Procedure:  SECTION ();  Surgeon: aGviota Trevino MD;  Location: [facility];  Service: Obstetrics       Family History   Problem Relation Age of Onset    Heart disease Father     Hypertension Father     Spina bifida Cousin     No Known Problems Mother     No Known Problems Brother     No Known Problems Daughter     No Known Problems Son     Breast cancer Neg Hx     Colon cancer Neg Hx     Ovarian cancer Neg Hx     Cervical cancer Neg Hx     Uterine cancer Neg Hx          Medications have been verified.        Objective   /100   Pulse 88   Temp 97.7 °F (36.5 °C)   Resp 16   SpO2 100%        Physical Exam     Physical Exam  Vitals and nursing note reviewed.   Constitutional:       General: She is not in acute distress.     Appearance: Normal appearance. She is not ill-appearing.   HENT:      Head: Normocephalic and atraumatic.      Nose: Nose normal.      Mouth/Throat:      Mouth: Mucous membranes are moist.      Pharynx: Oropharynx is clear.   Eyes:      Extraocular Movements: Extraocular movements intact.      Pupils: Pupils are equal, round, and reactive to light.   Cardiovascular:      Rate and Rhythm: Normal rate and regular rhythm.      Pulses: Normal pulses.      Heart sounds: Normal  heart sounds. No murmur heard.  Pulmonary:      Effort: Pulmonary effort is normal. No respiratory distress.      Breath sounds: Normal breath sounds. No wheezing or rhonchi.   Abdominal:      Palpations: Abdomen is soft.      Tenderness: There is no abdominal tenderness. There is no right CVA tenderness or left CVA tenderness.   Musculoskeletal:         General: Normal range of motion.      Cervical back: Normal range of motion.   Skin:     General: Skin is warm and dry.      Capillary Refill: Capillary refill takes less than 2 seconds.   Neurological:      General: No focal deficit present.      Mental Status: She is alert and oriented to person, place, and time.   Psychiatric:         Mood and Affect: Mood normal.         Behavior: Behavior normal.

## 2024-04-11 ENCOUNTER — EVALUATION (OUTPATIENT)
Dept: PHYSICAL THERAPY | Facility: CLINIC | Age: 34
End: 2024-04-11
Payer: COMMERCIAL

## 2024-04-11 VITALS — DIASTOLIC BLOOD PRESSURE: 102 MMHG | SYSTOLIC BLOOD PRESSURE: 154 MMHG

## 2024-04-11 DIAGNOSIS — M25.562 CHRONIC PAIN OF LEFT KNEE: ICD-10-CM

## 2024-04-11 DIAGNOSIS — M22.2X1 PATELLOFEMORAL PAIN SYNDROME OF BOTH KNEES: Primary | ICD-10-CM

## 2024-04-11 DIAGNOSIS — G89.29 CHRONIC PAIN OF LEFT KNEE: ICD-10-CM

## 2024-04-11 DIAGNOSIS — M22.2X2 PATELLOFEMORAL PAIN SYNDROME OF BOTH KNEES: Primary | ICD-10-CM

## 2024-04-11 DIAGNOSIS — M25.561 CHRONIC PAIN OF RIGHT KNEE: ICD-10-CM

## 2024-04-11 DIAGNOSIS — G89.29 CHRONIC PAIN OF RIGHT KNEE: ICD-10-CM

## 2024-04-11 DIAGNOSIS — M76.51 PATELLAR TENDINITIS OF RIGHT KNEE: ICD-10-CM

## 2024-04-11 DIAGNOSIS — S89.90XS KNEE INJURY, UNSPECIFIED LATERALITY, SEQUELA: ICD-10-CM

## 2024-04-11 PROCEDURE — 97110 THERAPEUTIC EXERCISES: CPT | Performed by: PHYSICAL THERAPIST

## 2024-04-11 PROCEDURE — 97162 PT EVAL MOD COMPLEX 30 MIN: CPT | Performed by: PHYSICAL THERAPIST
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
## 2024-04-15 ENCOUNTER — TELEPHONE (OUTPATIENT)
Age: 34
End: 2024-04-15

## 2024-04-15 NOTE — TELEPHONE ENCOUNTER
Caller: Self    Doctor: Angel    Reason for call: Patient left braces at work and cannot find. What is the process for getting new ones?    Call back#: [phone]

## 2024-04-17 ENCOUNTER — APPOINTMENT (OUTPATIENT)
Dept: PHYSICAL THERAPY | Facility: CLINIC | Age: 34
End: 2024-04-17
Payer: COMMERCIAL

## 2024-04-17 DIAGNOSIS — M22.2X1 PATELLOFEMORAL PAIN SYNDROME OF BOTH KNEES: Primary | ICD-10-CM

## 2024-04-17 DIAGNOSIS — G89.29 CHRONIC PAIN OF LEFT KNEE: ICD-10-CM

## 2024-04-17 DIAGNOSIS — G89.29 CHRONIC PAIN OF RIGHT KNEE: ICD-10-CM

## 2024-04-17 DIAGNOSIS — M25.562 CHRONIC PAIN OF LEFT KNEE: ICD-10-CM

## 2024-04-17 DIAGNOSIS — M76.51 PATELLAR TENDINITIS OF RIGHT KNEE: ICD-10-CM

## 2024-04-17 DIAGNOSIS — S89.90XS KNEE INJURY, UNSPECIFIED LATERALITY, SEQUELA: ICD-10-CM

## 2024-04-17 DIAGNOSIS — M25.561 CHRONIC PAIN OF RIGHT KNEE: ICD-10-CM

## 2024-04-17 DIAGNOSIS — M22.2X2 PATELLOFEMORAL PAIN SYNDROME OF BOTH KNEES: Primary | ICD-10-CM

## 2024-04-17 NOTE — PROGRESS NOTES
Daily Note     Today's date: 2024  Patient name: Brianna Mata  : 1990  MRN: [id number]  Referring provider: Jaren Ortiz DO  Dx:   Encounter Diagnosis     ICD-10-CM    1. Patellofemoral pain syndrome of both knees  M22.2X1     M22.2X2       2. Knee injury, unspecified laterality, sequela  S89.90XS       3. Chronic pain of left knee  M25.562     G89.29       4. Chronic pain of right knee  M25.561     G89.29       5. Patellar tendinitis of right knee  M76.51                      Subjective: ***      Objective: See treatment diary below      Assessment: Tolerated treatment well. Patient demonstrated fatigue post treatment and would benefit from continued PT.       Plan: Continue per plan of care.  Progress treatment as tolerated.       POC expires Unit limit Auth  expiration date PT/OT + Visit Limit?   24 N/A N/a N/A                 Visit/Unit Tracking  AUTH Status:  Date              N/A Used 1 2              Remaining                  Precautions: HTN      Manuals            Check BP                                                    Neuro Re-Ed             Kate: TKE             Supine SLR Reviewed                                                                             Ther Ex             Patient education: pathophysiology, differential diagnosis, HEP review GR            Upright bike             Prone quad str. With 1/2 foam roll beneath distal thigh Reviewed            Supine h/s str.             Prostretch             Hip abduction side lying Reviewed            Standing hip abd, ext with TB                                                                 Ther Activity             Total gym: bilateral squats             LSD                          Gait Training                                       Modalities                                       Access Code: [email or web address]  URL: [email or web address]  Date: 2024  Prepared by: Graham Rangel  -  Supine Active Straight Leg Raise  - 1 x daily - 7 x weekly - 2 sets - 10 reps  - Sidelying Hip Abduction  - 1 x daily - 7 x weekly - 2 sets - 10 reps  - Prone Quad Stretch with Towel Roll and Strap  - 1 x daily - 7 x weekly - 3 sets - 1 reps - 30 seconds hold

## 2024-04-17 NOTE — TELEPHONE ENCOUNTER
Spoke with patient, she will call her insurance to see if they'll cover replacements. If they agree, she can come to [place] and get replacements, if not she will go to [facility]. I will let you know if she calls back when she would like to come.

## 2024-04-17 NOTE — TELEPHONE ENCOUNTER
Caller: patient    Doctor: Angel    Reason for call: patient called to check status on getting new braces  Please advise    Call back#: [phone]

## 2024-04-18 ENCOUNTER — OFFICE VISIT (OUTPATIENT)
Dept: PHYSICAL THERAPY | Facility: CLINIC | Age: 34
End: 2024-04-18
Payer: COMMERCIAL

## 2024-04-18 DIAGNOSIS — M25.561 CHRONIC PAIN OF RIGHT KNEE: ICD-10-CM

## 2024-04-18 DIAGNOSIS — M22.2X2 PATELLOFEMORAL PAIN SYNDROME OF BOTH KNEES: Primary | ICD-10-CM

## 2024-04-18 DIAGNOSIS — M22.2X1 PATELLOFEMORAL PAIN SYNDROME OF BOTH KNEES: Primary | ICD-10-CM

## 2024-04-18 DIAGNOSIS — G89.29 CHRONIC PAIN OF LEFT KNEE: ICD-10-CM

## 2024-04-18 DIAGNOSIS — M76.51 PATELLAR TENDINITIS OF RIGHT KNEE: ICD-10-CM

## 2024-04-18 DIAGNOSIS — G89.29 CHRONIC PAIN OF RIGHT KNEE: ICD-10-CM

## 2024-04-18 DIAGNOSIS — M25.562 CHRONIC PAIN OF LEFT KNEE: ICD-10-CM

## 2024-04-18 DIAGNOSIS — S89.90XS KNEE INJURY, UNSPECIFIED LATERALITY, SEQUELA: ICD-10-CM

## 2024-04-18 PROCEDURE — 97530 THERAPEUTIC ACTIVITIES: CPT

## 2024-04-18 PROCEDURE — 97112 NEUROMUSCULAR REEDUCATION: CPT

## 2024-04-18 PROCEDURE — 97110 THERAPEUTIC EXERCISES: CPT

## 2024-04-18 NOTE — PROGRESS NOTES
"Daily Note     Today's date: 2024  Patient name: Brianna Mata  : 1990  MRN: [id number]  Referring provider: Jaren Ortiz DO  Dx:   Encounter Diagnosis     ICD-10-CM    1. Patellofemoral pain syndrome of both knees  M22.2X1     M22.2X2       2. Knee injury, unspecified laterality, sequela  S89.90XS       3. Chronic pain of left knee  M25.562     G89.29       4. Chronic pain of right knee  M25.561     G89.29       5. Patellar tendinitis of right knee  M76.51           Start Time: 1653  Stop Time: 1732  Total time in clinic (min): 39 minutes    Subjective: pt noted that she has been having a little more pain due to       Objective: See treatment diary below      Assessment:  continued with treatment session with focus on b/l knees. At this time was able to make progressions with verbal cues for proper form and technique. Tolerated treatment well. Patient exhibited good technique with therapeutic exercises and would benefit from continued PT  S/p treatment session, noted minimal soreness at this time.     Plan: Continue per plan of care.      POC expires Unit limit Auth  expiration date PT/OT + Visit Limit?   24 N/A N/a N/A                 Visit/Unit Tracking  AUTH Status:  Date               N/A Used 1               Remaining                  Precautions: HTN      Manuals            Check BP                                                    Neuro Re-Ed             [place]: TKE             Supine SLR Reviewed 2x10 b/l                                                                             Ther Ex             Patient education: pathophysiology, differential diagnosis, HEP review GR            Upright bike  Nustep L3 LE only            Prone quad str. With 1/2 foam roll beneath distal thigh Reviewed            Supine h/s str.  Supipne with strap 30\" x 3            Prostretch             Hip abduction side lying Reviewed            Standing hip abd, ext with TB             Sidelying " abd   10x b/l                                                   Ther Activity             Total gym: bilateral squats             LSD             FSU   30x b/l            Gait Training                                       Modalities                                       Access Code: [email or web address]  URL: [email or web address]  Date: 04/11/2024  Prepared by: Graham Harvey    Exercises  - Supine Active Straight Leg Raise  - 1 x daily - 7 x weekly - 2 sets - 10 reps  - Sidelying Hip Abduction  - 1 x daily - 7 x weekly - 2 sets - 10 reps  - Prone Quad Stretch with Towel Roll and Strap  - 1 x daily - 7 x weekly - 3 sets - 1 reps - 30 seconds hold

## 2024-04-23 ENCOUNTER — TELEPHONE (OUTPATIENT)
Dept: OBGYN CLINIC | Facility: HOSPITAL | Age: 34
End: 2024-04-23
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
## 2024-04-24 ENCOUNTER — OFFICE VISIT (OUTPATIENT)
Dept: PHYSICAL THERAPY | Facility: CLINIC | Age: 34
End: 2024-04-24
Payer: COMMERCIAL

## 2024-04-24 DIAGNOSIS — M25.562 CHRONIC PAIN OF LEFT KNEE: ICD-10-CM

## 2024-04-24 DIAGNOSIS — M25.561 CHRONIC PAIN OF RIGHT KNEE: ICD-10-CM

## 2024-04-24 DIAGNOSIS — G89.29 CHRONIC PAIN OF LEFT KNEE: ICD-10-CM

## 2024-04-24 DIAGNOSIS — M22.2X2 PATELLOFEMORAL PAIN SYNDROME OF BOTH KNEES: Primary | ICD-10-CM

## 2024-04-24 DIAGNOSIS — G89.29 CHRONIC PAIN OF RIGHT KNEE: ICD-10-CM

## 2024-04-24 DIAGNOSIS — M76.51 PATELLAR TENDINITIS OF RIGHT KNEE: ICD-10-CM

## 2024-04-24 DIAGNOSIS — S89.90XS KNEE INJURY, UNSPECIFIED LATERALITY, SEQUELA: ICD-10-CM

## 2024-04-24 DIAGNOSIS — M22.2X1 PATELLOFEMORAL PAIN SYNDROME OF BOTH KNEES: Primary | ICD-10-CM

## 2024-04-24 PROCEDURE — 97110 THERAPEUTIC EXERCISES: CPT | Performed by: PHYSICAL THERAPIST

## 2024-04-24 PROCEDURE — 97530 THERAPEUTIC ACTIVITIES: CPT | Performed by: PHYSICAL THERAPIST

## 2024-04-24 NOTE — PROGRESS NOTES
"Daily Note     Today's date: 2024  Patient name: Brianna Mata  : 1990  MRN: [id number]  Referring provider: Jaren Ortiz DO  Dx:   Encounter Diagnosis     ICD-10-CM    1. Patellofemoral pain syndrome of both knees  M22.2X1     M22.2X2       2. Knee injury, unspecified laterality, sequela  S89.90XS       3. Chronic pain of left knee  M25.562     G89.29       4. Chronic pain of right knee  M25.561     G89.29       5. Patellar tendinitis of right knee  M76.51           Start Time: 1715  Stop Time: 1810  Total time in clinic (min): 55 minutes    Subjective: Patient reports that her knees were sore following her last visit.      Objective: See treatment diary below      Assessment: Tolerated treatment well. Patient demonstrated fatigue post treatment and would benefit from continued PT. Patient with intermittent, pain-free clicking in right knee with weightbearing quadriceps strengthening. Patient encouraged to contact PCP regarding consistently high BP values.      Plan: Continue per plan of care.  Progress treatment as tolerated.       POC expires Unit limit Auth  expiration date PT/OT + Visit Limit?   24 N/A N/a N/A                 Visit/Unit Tracking  AUTH Status:  Date             N/A Used 1 2 3             Remaining                  Precautions: HTN      Manuals           Check BP   138 / 96 mmHg                                                 Neuro Re-Ed             [place]: TKE   10# 10x10\" b/l          Supine SLR Reviewed 2x10 b/l                                                                             Ther Ex             Patient education: pathophysiology, differential diagnosis, HEP review GR            Upright bike  Nustep L3 LE only  Upright bike 10 min          Prone quad str. With 1/2 foam roll beneath distal thigh Reviewed            Supine h/s str.  Supipne with strap 30\" x 3            Prostretch   3x30\" b/l          Hip abduction side lying Reviewed " "10x b/l           Standing hip abd, ext with TB   RTB x10 ea. B/l                                                              Ther Activity             Total gym: bilateral squats   L22 2x10          LSD   6\" x10 b/l          FSU   30x b/l            Gait Training                                       Modalities                                       Access Code: [email or web address]  URL: [email or web address]  Date: 04/11/2024  Prepared by: Graham Harvey    Exercises  - Supine Active Straight Leg Raise  - 1 x daily - 7 x weekly - 2 sets - 10 reps  - Sidelying Hip Abduction  - 1 x daily - 7 x weekly - 2 sets - 10 reps  - Prone Quad Stretch with Towel Roll and Strap  - 1 x daily - 7 x weekly - 3 sets - 1 reps - 30 seconds hold          "

## 2024-04-25 ENCOUNTER — APPOINTMENT (OUTPATIENT)
Dept: PHYSICAL THERAPY | Facility: CLINIC | Age: 34
End: 2024-04-25
Payer: COMMERCIAL

## 2024-05-01 ENCOUNTER — APPOINTMENT (OUTPATIENT)
Dept: PHYSICAL THERAPY | Facility: CLINIC | Age: 34
End: 2024-05-01
Payer: COMMERCIAL

## 2024-05-01 NOTE — PROGRESS NOTES
"Daily Note     Today's date: 2024  Patient name: Brianna Mata  : 1990  MRN: [id number]  Referring provider: Jaren Oritz DO  Dx:   Encounter Diagnosis     ICD-10-CM    1. Patellofemoral pain syndrome of both knees  M22.2X1     M22.2X2       2. Knee injury, unspecified laterality, sequela  S89.90XS       3. Chronic pain of left knee  M25.562     G89.29       4. Chronic pain of right knee  M25.561     G89.29       5. Patellar tendinitis of right knee  M76.51                      Subjective: ***      Objective: See treatment diary below      Assessment: Tolerated treatment well. Patient demonstrated fatigue post treatment and would benefit from continued PT.       Plan: Continue per plan of care.  Progress treatment as tolerated.       POC expires Unit limit Auth  expiration date PT/OT + Visit Limit?   24 N/A N/a N/A                 Visit/Unit Tracking  AUTH Status:  Date            N/A Used 1 2 3 4            Remaining                  Precautions: HTN      Manuals          Check BP   138 / 96 mmHg                                                 Neuro Re-Ed             Kate: TKE   10# 10x10\" b/l          Supine SLR Reviewed 2x10 b/l                                                                             Ther Ex             Patient education: pathophysiology, differential diagnosis, HEP review GR            Upright bike  Nustep L3 LE only  Upright bike 10 min          Prone quad str. With 1/2 foam roll beneath distal thigh Reviewed            Supine h/s str.  Supipne with strap 30\" x 3            Prostretch   3x30\" b/l          Hip abduction side lying Reviewed 10x b/l           Standing hip abd, ext with TB   RTB x10 ea. B/l                                                              Ther Activity             Total gym: bilateral squats   L22 2x10          LSD   6\" x10 b/l          FSU   30x b/l            Gait Training                                    "    Modalities                                       Access Code: [email or web address]  URL: [email or web address]  Date: 04/11/2024  Prepared by: Graham Harvey    Exercises  - Supine Active Straight Leg Raise  - 1 x daily - 7 x weekly - 2 sets - 10 reps  - Sidelying Hip Abduction  - 1 x daily - 7 x weekly - 2 sets - 10 reps  - Prone Quad Stretch with Towel Roll and Strap  - 1 x daily - 7 x weekly - 3 sets - 1 reps - 30 seconds hold

## 2024-05-02 ENCOUNTER — APPOINTMENT (OUTPATIENT)
Dept: PHYSICAL THERAPY | Facility: CLINIC | Age: 34
End: 2024-05-02
Payer: COMMERCIAL

## 2024-05-08 ENCOUNTER — APPOINTMENT (OUTPATIENT)
Dept: PHYSICAL THERAPY | Facility: CLINIC | Age: 34
End: 2024-05-08
Payer: COMMERCIAL

## 2024-05-08 DIAGNOSIS — M76.51 PATELLAR TENDINITIS OF RIGHT KNEE: ICD-10-CM

## 2024-05-08 DIAGNOSIS — G89.29 CHRONIC PAIN OF RIGHT KNEE: ICD-10-CM

## 2024-05-08 DIAGNOSIS — S89.90XS KNEE INJURY, UNSPECIFIED LATERALITY, SEQUELA: ICD-10-CM

## 2024-05-08 DIAGNOSIS — M22.2X1 PATELLOFEMORAL PAIN SYNDROME OF BOTH KNEES: Primary | ICD-10-CM

## 2024-05-08 DIAGNOSIS — M22.2X2 PATELLOFEMORAL PAIN SYNDROME OF BOTH KNEES: Primary | ICD-10-CM

## 2024-05-08 DIAGNOSIS — M25.561 CHRONIC PAIN OF RIGHT KNEE: ICD-10-CM

## 2024-05-08 DIAGNOSIS — M25.562 CHRONIC PAIN OF LEFT KNEE: ICD-10-CM

## 2024-05-08 DIAGNOSIS — G89.29 CHRONIC PAIN OF LEFT KNEE: ICD-10-CM

## 2024-05-08 NOTE — PROGRESS NOTES
"Daily Note     Today's date: 2024  Patient name: Brianna Mata  : 1990  MRN: [id number]  Referring provider: Jaren Ortiz DO  Dx:   Encounter Diagnosis     ICD-10-CM    1. Patellofemoral pain syndrome of both knees  M22.2X1     M22.2X2       2. Knee injury, unspecified laterality, sequela  S89.90XS       3. Chronic pain of left knee  M25.562     G89.29       4. Chronic pain of right knee  M25.561     G89.29       5. Patellar tendinitis of right knee  M76.51                      Subjective: ***      Objective: See treatment diary below      Assessment: Tolerated treatment well. Patient demonstrated fatigue post treatment and would benefit from continued PT.       Plan: Continue per plan of care.  Progress treatment as tolerated.       POC expires Unit limit Auth  expiration date PT/OT + Visit Limit?   24 N/A N/a N/A                 Visit/Unit Tracking  AUTH Status:  Date            N/A Used 1 2 3 4            Remaining                  Precautions: HTN      Manuals          Check BP   138 / 96 mmHg                                                 Neuro Re-Ed             Kate: TKE   10# 10x10\" b/l          Supine SLR Reviewed 2x10 b/l                                                                             Ther Ex             Patient education: pathophysiology, differential diagnosis, HEP review GR            Upright bike  Nustep L3 LE only  Upright bike 10 min          Prone quad str. With 1/2 foam roll beneath distal thigh Reviewed            Supine h/s str.  Supipne with strap 30\" x 3            Prostretch   3x30\" b/l          Hip abduction side lying Reviewed 10x b/l           Standing hip abd, ext with TB   RTB x10 ea. B/l                                                              Ther Activity             Total gym: bilateral squats   L22 2x10          LSD   6\" x10 b/l          FSU   30x b/l            Gait Training                                    "    Modalities                                       Access Code: [email or web address]  URL: [email or web address]  Date: 04/11/2024  Prepared by: Graham Harvey    Exercises  - Supine Active Straight Leg Raise  - 1 x daily - 7 x weekly - 2 sets - 10 reps  - Sidelying Hip Abduction  - 1 x daily - 7 x weekly - 2 sets - 10 reps  - Prone Quad Stretch with Towel Roll and Strap  - 1 x daily - 7 x weekly - 3 sets - 1 reps - 30 seconds hold

## 2024-05-09 ENCOUNTER — EVALUATION (OUTPATIENT)
Dept: PHYSICAL THERAPY | Facility: CLINIC | Age: 34
End: 2024-05-09
Payer: COMMERCIAL

## 2024-05-09 VITALS — SYSTOLIC BLOOD PRESSURE: 125 MMHG | DIASTOLIC BLOOD PRESSURE: 95 MMHG

## 2024-05-09 DIAGNOSIS — M76.51 PATELLAR TENDINITIS OF RIGHT KNEE: ICD-10-CM

## 2024-05-09 DIAGNOSIS — M22.2X1 PATELLOFEMORAL PAIN SYNDROME OF BOTH KNEES: Primary | ICD-10-CM

## 2024-05-09 DIAGNOSIS — G89.29 CHRONIC PAIN OF RIGHT KNEE: ICD-10-CM

## 2024-05-09 DIAGNOSIS — M25.561 CHRONIC PAIN OF RIGHT KNEE: ICD-10-CM

## 2024-05-09 DIAGNOSIS — M22.2X2 PATELLOFEMORAL PAIN SYNDROME OF BOTH KNEES: Primary | ICD-10-CM

## 2024-05-09 DIAGNOSIS — S89.90XS KNEE INJURY, UNSPECIFIED LATERALITY, SEQUELA: ICD-10-CM

## 2024-05-09 DIAGNOSIS — G89.29 CHRONIC PAIN OF LEFT KNEE: ICD-10-CM

## 2024-05-09 DIAGNOSIS — M25.562 CHRONIC PAIN OF LEFT KNEE: ICD-10-CM

## 2024-05-09 PROCEDURE — 97110 THERAPEUTIC EXERCISES: CPT | Performed by: PHYSICAL THERAPIST

## 2024-05-09 PROCEDURE — 97530 THERAPEUTIC ACTIVITIES: CPT | Performed by: PHYSICAL THERAPIST

## 2024-05-09 NOTE — PROGRESS NOTES
"Daily Note     Today's date: 2024  Patient name: Brianna Mata  : 1990  MRN: [id number]  Referring provider: Jaren Ortiz DO  Dx:   Encounter Diagnosis     ICD-10-CM    1. Patellofemoral pain syndrome of both knees  M22.2X1     M22.2X2       2. Knee injury, unspecified laterality, sequela  S89.90XS       3. Chronic pain of left knee  M25.562     G89.29       4. Chronic pain of right knee  M25.561     G89.29       5. Patellar tendinitis of right knee  M76.51           Start Time: 1400          Subjective: Patient reports that her knees have been slightly better.      Objective: See treatment diary below    Vitals:    24 1451   BP: 125/95   BP Location: Left arm   Patient Position: Sitting       Assessment: Tolerated treatment well. Patient demonstrated fatigue post treatment and would benefit from continued PT. Patient reported mild increase in knee weakness secondary to fatigue at end of session.      Plan: Continue per plan of care.  Progress treatment as tolerated.       POC expires Unit limit Auth  expiration date PT/OT + Visit Limit?   24 N/A N/a N/A                 Visit/Unit Tracking  AUTH Status:  Date           N/A Used 1 2 3 4 5           Remaining                  Precautions: HTN      Manuals          Check BP   138 / 96 mmHg 125 / 95 mmHg                                                Neuro Re-Ed             [place]: TKE   10# 10x10\" b/l 10# 15x5\" b/l         Supine SLR Reviewed 2x10 b/l                                                                             Ther Ex             Patient education: pathophysiology, differential diagnosis, HEP review GR   HEP review         Upright bike  Nustep L3 LE only  Upright bike 10 min Upright bike 10 min         Prone quad str. With 1/2 foam roll beneath distal thigh Reviewed            Supine h/s str.  Supipne with strap 30\" x 3            Prostretch   3x30\" b/l          Hip abduction side lying " "Reviewed 10x b/l           Standing hip abd, ext with TB   RTB x10 ea. B/l RTB x15 ea. B/l                                                             Ther Activity             Total gym: bilateral squats   L22 2x10 L22 x16         LSD   6\" x10 b/l 6\" x15 b/l         FSU   30x b/l            Gait Training                                       Modalities                                       Access Code: [email or web address]  URL: [email or web address]  Date: 05/09/2024  Prepared by: Graham Harvey    Exercises  - Prone Quadriceps Stretch with Strap  - 1 x daily - 4 x weekly - 3 sets - 1 reps - 30 seconds hold  - Long Sitting Calf Stretch with Strap  - 1 x daily - 4 x weekly - 3 sets - 1 reps - 30 seconds hold  - Standing Terminal Knee Extension with Resistance  - 1 x daily - 4 x weekly - 2 sets - 10 reps - 5 seconds hold  - Active Straight Leg Raise with Quad Set  - 1 x daily - 4 x weekly - 2 sets - 10 reps  - Sidelying Hip Abduction  - 1 x daily - 4 x weekly - 2 sets - 10 reps  - Standing Hip Abduction with Resistance at Ankles and Counter Support  - 1 x daily - 4 x weekly - 2 sets - 10 reps  - Standing Hip Extension with Resistance at Ankles and Counter Support  - 1 x daily - 4 x weekly - 2 sets - 10 reps  - Squat with Chair Touch  - 1 x daily - 4 x weekly - 2 sets - 10 reps  - Lateral Step Down  - 1 x daily - 4 x weekly - 2 sets - 10 reps               "

## 2024-05-13 ENCOUNTER — TELEPHONE (OUTPATIENT)
Dept: OBGYN CLINIC | Facility: MEDICAL CENTER | Age: 34
End: 2024-05-13

## 2024-05-13 NOTE — TELEPHONE ENCOUNTER
I was informed by  ([facility]) that Brianna is picking up her DME's. Brianna is a Dr. Ortiz pt. Dr. Ortiz ordered b/l hinge knee braces for pt. Dispensed Knee braces to pt. in office while working for my provider Dr. Dudley. Pt. Thanked me and I had pt. Fill out [facility] order form for b/l braces.

## 2024-05-16 ENCOUNTER — TELEPHONE (OUTPATIENT)
Dept: PHYSICAL THERAPY | Facility: CLINIC | Age: 34
End: 2024-05-16

## 2024-05-16 NOTE — TELEPHONE ENCOUNTER
LM for patient on 5/16/24 at 12:30pm due to NS to her PT appointment scheduled for 12:15pm    Advised if she would like to R/S within this week to call back.     Asked patient to please confirm her remaining PT appointments as well.     Provided call back number [phone].

## 2024-05-18 DIAGNOSIS — Z30.9 ENCOUNTER FOR CONTRACEPTIVE MANAGEMENT, UNSPECIFIED TYPE: ICD-10-CM

## 2024-05-20 ENCOUNTER — TELEPHONE (OUTPATIENT)
Age: 34
End: 2024-05-20

## 2024-05-20 DIAGNOSIS — Z30.9 ENCOUNTER FOR CONTRACEPTIVE MANAGEMENT, UNSPECIFIED TYPE: ICD-10-CM

## 2024-05-20 RX ORDER — ACETAMINOPHEN AND CODEINE PHOSPHATE 120; 12 MG/5ML; MG/5ML
1 SOLUTION ORAL DAILY
Qty: 84 TABLET | Refills: 0 | Status: CANCELLED | OUTPATIENT
Start: 2024-05-20

## 2024-05-20 RX ORDER — ACETAMINOPHEN AND CODEINE PHOSPHATE 120; 12 MG/5ML; MG/5ML
1 SOLUTION ORAL DAILY
Qty: 28 TABLET | Refills: 1 | Status: SHIPPED | OUTPATIENT
Start: 2024-05-20

## 2024-05-21 NOTE — TELEPHONE ENCOUNTER
Called patient. Patient aware refill sent in by provider. Patient verbalized understanding. No further questions at this time.

## 2024-05-22 ENCOUNTER — APPOINTMENT (OUTPATIENT)
Dept: PHYSICAL THERAPY | Facility: CLINIC | Age: 34
End: 2024-05-22
Payer: COMMERCIAL

## 2024-05-22 DIAGNOSIS — G89.29 CHRONIC PAIN OF RIGHT KNEE: ICD-10-CM

## 2024-05-22 DIAGNOSIS — M25.562 CHRONIC PAIN OF LEFT KNEE: ICD-10-CM

## 2024-05-22 DIAGNOSIS — G89.29 CHRONIC PAIN OF LEFT KNEE: ICD-10-CM

## 2024-05-22 DIAGNOSIS — S89.90XS KNEE INJURY, UNSPECIFIED LATERALITY, SEQUELA: ICD-10-CM

## 2024-05-22 DIAGNOSIS — M76.51 PATELLAR TENDINITIS OF RIGHT KNEE: ICD-10-CM

## 2024-05-22 DIAGNOSIS — M22.2X1 PATELLOFEMORAL PAIN SYNDROME OF BOTH KNEES: Primary | ICD-10-CM

## 2024-05-22 DIAGNOSIS — M22.2X2 PATELLOFEMORAL PAIN SYNDROME OF BOTH KNEES: Primary | ICD-10-CM

## 2024-05-22 DIAGNOSIS — M25.561 CHRONIC PAIN OF RIGHT KNEE: ICD-10-CM

## 2024-05-22 NOTE — PROGRESS NOTES
"Daily Note     Today's date: 2024  Patient name: Brianna Mata  : 1990  MRN: [id number]  Referring provider: Jaren Ortiz DO  Dx:   Encounter Diagnosis     ICD-10-CM    1. Patellofemoral pain syndrome of both knees  M22.2X1     M22.2X2       2. Knee injury, unspecified laterality, sequela  S89.90XS       3. Chronic pain of left knee  M25.562     G89.29       4. Chronic pain of right knee  M25.561     G89.29       5. Patellar tendinitis of right knee  M76.51                      Subjective: ***      Objective: See treatment diary below      Assessment: Tolerated treatment fair. Patient demonstrated fatigue post treatment and would benefit from continued PT.       Plan: Continue per plan of care.  Progress treatment as tolerated.       POC expires Unit limit Auth  expiration date PT/OT + Visit Limit?   24 N/A N/a N/A                 Visit/Unit Tracking  AUTH Status:  Date          N/A Used 1 2 3 4 5 6          Remaining                  Precautions: HTN      Manuals         Check BP   138 / 96 mmHg 125 / 95 mmHg                                                Neuro Re-Ed             [place]: TKE   10# 10x10\" b/l 10# 15x5\" b/l         Supine SLR Reviewed 2x10 b/l                                                                             Ther Ex             Patient education: pathophysiology, differential diagnosis, HEP review GR   HEP review         Upright bike  Nustep L3 LE only  Upright bike 10 min Upright bike 10 min         Prone quad str. With 1/2 foam roll beneath distal thigh Reviewed            Supine h/s str.  Supipne with strap 30\" x 3            Prostretch   3x30\" b/l          Hip abduction side lying Reviewed 10x b/l           Standing hip abd, ext with TB   RTB x10 ea. B/l RTB x15 ea. B/l                                                             Ther Activity             Total gym: bilateral squats   L22 2x10 L22 x16         LSD   6\" " "x10 b/l 6\" x15 b/l         FSU   30x b/l            Gait Training                                       Modalities                                       Access Code: [email or web address]  URL: [email or web address]  Date: 05/09/2024  Prepared by: Graham Harvey    Exercises  - Prone Quadriceps Stretch with Strap  - 1 x daily - 4 x weekly - 3 sets - 1 reps - 30 seconds hold  - Long Sitting Calf Stretch with Strap  - 1 x daily - 4 x weekly - 3 sets - 1 reps - 30 seconds hold  - Standing Terminal Knee Extension with Resistance  - 1 x daily - 4 x weekly - 2 sets - 10 reps - 5 seconds hold  - Active Straight Leg Raise with Quad Set  - 1 x daily - 4 x weekly - 2 sets - 10 reps  - Sidelying Hip Abduction  - 1 x daily - 4 x weekly - 2 sets - 10 reps  - Standing Hip Abduction with Resistance at Ankles and Counter Support  - 1 x daily - 4 x weekly - 2 sets - 10 reps  - Standing Hip Extension with Resistance at Ankles and Counter Support  - 1 x daily - 4 x weekly - 2 sets - 10 reps  - Squat with Chair Touch  - 1 x daily - 4 x weekly - 2 sets - 10 reps  - Lateral Step Down  - 1 x daily - 4 x weekly - 2 sets - 10 reps                 "

## 2024-05-23 ENCOUNTER — OFFICE VISIT (OUTPATIENT)
Dept: PHYSICAL THERAPY | Facility: CLINIC | Age: 34
End: 2024-05-23
Payer: COMMERCIAL

## 2024-05-23 DIAGNOSIS — G89.29 CHRONIC PAIN OF LEFT KNEE: ICD-10-CM

## 2024-05-23 DIAGNOSIS — S89.90XS KNEE INJURY, UNSPECIFIED LATERALITY, SEQUELA: ICD-10-CM

## 2024-05-23 DIAGNOSIS — M25.562 CHRONIC PAIN OF LEFT KNEE: ICD-10-CM

## 2024-05-23 DIAGNOSIS — M22.2X1 PATELLOFEMORAL PAIN SYNDROME OF BOTH KNEES: Primary | ICD-10-CM

## 2024-05-23 DIAGNOSIS — M22.2X2 PATELLOFEMORAL PAIN SYNDROME OF BOTH KNEES: Primary | ICD-10-CM

## 2024-05-23 DIAGNOSIS — M25.561 CHRONIC PAIN OF RIGHT KNEE: ICD-10-CM

## 2024-05-23 DIAGNOSIS — G89.29 CHRONIC PAIN OF RIGHT KNEE: ICD-10-CM

## 2024-05-23 DIAGNOSIS — M76.51 PATELLAR TENDINITIS OF RIGHT KNEE: ICD-10-CM

## 2024-05-23 PROCEDURE — 97530 THERAPEUTIC ACTIVITIES: CPT | Performed by: PHYSICAL THERAPIST

## 2024-05-23 PROCEDURE — 97110 THERAPEUTIC EXERCISES: CPT | Performed by: PHYSICAL THERAPIST

## 2024-05-23 NOTE — PROGRESS NOTES
"Daily Note     Today's date: 2024  Patient name: Brianna Mata  : 1990  MRN: [id number]  Referring provider: Jaren Ortiz DO  Dx:   Encounter Diagnosis     ICD-10-CM    1. Patellofemoral pain syndrome of both knees  M22.2X1     M22.2X2       2. Knee injury, unspecified laterality, sequela  S89.90XS       3. Chronic pain of left knee  M25.562     G89.29       4. Chronic pain of right knee  M25.561     G89.29       5. Patellar tendinitis of right knee  M76.51           Start Time: 1145  Stop Time: 1230  Total time in clinic (min): 45 minutes    Subjective: Patient reports no significant changes to overall status since last visit.      Objective: See treatment diary below      Assessment: Tolerated treatment fair. Patient demonstrated fatigue post treatment and would benefit from continued PT. Patient reported increased pain with lunging. Able to progress strengthening this visit.      Plan: Continue per plan of care.  Progress treatment as tolerated.       POC expires Unit limit Auth  expiration date PT/OT + Visit Limit?   24 N/A N/a N/A                 Visit/Unit Tracking  AUTH Status:  Date          N/A Used 1 2 3 4 5 6          Remaining                  Precautions: HTN      Manuals         Check BP   138 / 96 mmHg 125 / 95 mmHg                                                Neuro Re-Ed             [place]: TKE   10# 10x10\" b/l 10# 15x5\" b/l         Supine SLR Reviewed 2x10 b/l    2# 2x10 b/l                                                                         Ther Ex             Patient education: pathophysiology, differential diagnosis, HEP review GR   HEP review         Upright bike  Nustep L3 LE only  Upright bike 10 min Upright bike 10 min Upright bike 10 min        Prone quad str. With 1/2 foam roll beneath distal thigh Reviewed            Supine h/s str.  Supipne with strap 30\" x 3            Prostretch   3x30\" b/l          Hip " "abduction side lying Reviewed 10x b/l   2# 2x10 b/l        Standing hip abd, ext with TB   RTB x10 ea. B/l RTB x15 ea. B/l         Sidestepping with TB     RTB 1 lap                                               Ther Activity             Total gym: bilateral squats   L22 2x10 L22 x16 L22 2x10        LSD   6\" x10 b/l 6\" x15 b/l         FSU   30x b/l            Stationary lunges     X10 b/l        Gait Training                                       Modalities                                       Access Code: [email or web address]  URL: [email or web address]  Date: 05/09/2024  Prepared by: Graham Harvey    Exercises  - Prone Quadriceps Stretch with Strap  - 1 x daily - 4 x weekly - 3 sets - 1 reps - 30 seconds hold  - Long Sitting Calf Stretch with Strap  - 1 x daily - 4 x weekly - 3 sets - 1 reps - 30 seconds hold  - Standing Terminal Knee Extension with Resistance  - 1 x daily - 4 x weekly - 2 sets - 10 reps - 5 seconds hold  - Active Straight Leg Raise with Quad Set  - 1 x daily - 4 x weekly - 2 sets - 10 reps  - Sidelying Hip Abduction  - 1 x daily - 4 x weekly - 2 sets - 10 reps  - Standing Hip Abduction with Resistance at Ankles and Counter Support  - 1 x daily - 4 x weekly - 2 sets - 10 reps  - Standing Hip Extension with Resistance at Ankles and Counter Support  - 1 x daily - 4 x weekly - 2 sets - 10 reps  - Squat with Chair Touch  - 1 x daily - 4 x weekly - 2 sets - 10 reps  - Lateral Step Down  - 1 x daily - 4 x weekly - 2 sets - 10 reps                   "

## 2024-05-24 ENCOUNTER — OFFICE VISIT (OUTPATIENT)
Dept: URGENT CARE | Facility: MEDICAL CENTER | Age: 34
End: 2024-05-24
Payer: COMMERCIAL

## 2024-05-24 VITALS
OXYGEN SATURATION: 99 % | WEIGHT: 158 LBS | TEMPERATURE: 98.2 F | BODY MASS INDEX: 27.99 KG/M2 | SYSTOLIC BLOOD PRESSURE: 130 MMHG | HEART RATE: 110 BPM | RESPIRATION RATE: 18 BRPM | DIASTOLIC BLOOD PRESSURE: 92 MMHG

## 2024-05-24 DIAGNOSIS — R42 DIZZINESS: ICD-10-CM

## 2024-05-24 DIAGNOSIS — G44.89 OTHER HEADACHE SYNDROME: Primary | ICD-10-CM

## 2024-05-24 PROCEDURE — 99213 OFFICE O/P EST LOW 20 MIN: CPT | Performed by: PHYSICIAN ASSISTANT

## 2024-05-24 PROCEDURE — S9088 SERVICES PROVIDED IN URGENT: HCPCS | Performed by: PHYSICIAN ASSISTANT

## 2024-05-24 PROCEDURE — 93005 ELECTROCARDIOGRAM TRACING: CPT | Performed by: PHYSICIAN ASSISTANT

## 2024-05-24 NOTE — PROGRESS NOTES
[facility]        NAME: Brianna Mata is a 33 y.o. female  : 1990    MRN: [id number]  DATE: May 24, 2024  TIME: 4:28 PM    /92   Pulse (!) 110   Temp 98.2 °F (36.8 °C)   Resp 18   Wt 71.7 kg (158 lb)   SpO2 99%   BMI 27.99 kg/m²     Assessment and Plan   Other headache syndrome [G44.89]  1. Other headache syndrome        2. Dizziness              Patient Instructions       Follow up with PCP in 3-5 days.  Proceed to  ER if symptoms worsen.    Chief Complaint     Chief Complaint   Patient presents with    Dizziness     Pt. C/O dizziness and high bp this am. Reports that her blood pressure was 150/107 around 9 am. Symptoms resolved around 1 pm.         History of Present Illness       Pt with  4-5 hours of dizziness and frontal headache  all has resolved no dizziness nor headache now     Dizziness  Associated symptoms include headaches.       Review of Systems   Review of Systems   Constitutional: Negative.    HENT: Negative.     Eyes: Negative.    Respiratory: Negative.     Cardiovascular: Negative.    Gastrointestinal: Negative.    Endocrine: Negative.    Genitourinary: Negative.    Musculoskeletal: Negative.    Skin: Negative.    Allergic/Immunologic: Negative.    Neurological:  Positive for dizziness and headaches.   Hematological: Negative.    Psychiatric/Behavioral: Negative.     All other systems reviewed and are negative.        Current Medications       Current Outpatient Medications:     albuterol (PROVENTIL HFA,VENTOLIN HFA) 90 mcg/act inhaler, Inhale 2 puffs every 6 (six) hours as needed for wheezing, Disp: 8.5 g, Rfl: 1    buPROPion (WELLBUTRIN XL) 300 mg 24 hr tablet, Take 1 tablet (300 mg total) by mouth daily, Disp: 30 tablet, Rfl: 5    hydrOXYzine pamoate (VISTARIL) 25 mg capsule, Take 1 capsule (25 mg total) by mouth 3 (three) times a day as needed for anxiety, Disp: 90 capsule, Rfl: 1    norethindrone (MICRONOR) 0.35 MG tablet, TAKE 1 TABLET BY MOUTH EVERY DAY, Disp:  28 tablet, Rfl: 1    fluconazole (DIFLUCAN) 150 mg tablet, Take 150 mg by mouth once (Patient not taking: Reported on 2024), Disp: , Rfl:     fluticasone (FLONASE) 50 mcg/act nasal spray, 2 sprays into each nostril (Patient not taking: Reported on 2024), Disp: , Rfl:     meloxicam (MOBIC) 15 mg tablet, Take 1 tablet (15 mg total) by mouth daily as needed for moderate pain (Patient not taking: Reported on 2024), Disp: 60 tablet, Rfl: 0    methocarbamol (ROBAXIN) 500 mg tablet, Take 1 tablet (500 mg total) by mouth 3 (three) times a day as needed for muscle spasms (Patient not taking: Reported on 2024), Disp: 30 tablet, Rfl: 0    Current Allergies     Allergies as of 2024 - Reviewed 2024   Allergen Reaction Noted    Doxycycline Other (See Comments) 2023    Poison ivy extract Rash 2015            The following portions of the patient's history were reviewed and updated as appropriate: allergies, current medications, past family history, past medical history, past social history, past surgical history and problem list.     Past Medical History:   Diagnosis Date    Depression     Hypertension     Intracranial hemorrhage (HCC) 2019    Varicella        Past Surgical History:   Procedure Laterality Date     SECTION      MYRINGOTOMY W/ TUBES      [place]  DELIVERY ONLY N/A 2018    Procedure:  SECTION ();  Surgeon: Gaviota Trevino MD;  [facility]: [facility];  Service: Obstetrics       Family History   Problem Relation Age of Onset    Heart disease Father     Hypertension Father     Spina bifida Cousin     No Known Problems Mother     No Known Problems Brother     No Known Problems Daughter     No Known Problems Son     Breast cancer Neg Hx     Colon cancer Neg Hx     Ovarian cancer Neg Hx     Cervical cancer Neg Hx     Uterine cancer Neg Hx          Medications have been verified.        Objective   /92   Pulse (!) 110   Temp 98.2 °F (36.8  °C)   Resp 18   Wt 71.7 kg (158 lb)   SpO2 99%   BMI 27.99 kg/m²        Physical Exam     Physical Exam  Vitals and nursing note reviewed.   Constitutional:       Appearance: Normal appearance. She is normal weight.      Comments: No complaints now in office  pt denies pregancy etoh or drug use    HENT:      Head: Normocephalic and atraumatic.      Right Ear: Tympanic membrane, ear canal and external ear normal.      Left Ear: Tympanic membrane, ear canal and external ear normal.      Nose: Nose normal.      Mouth/Throat:      Mouth: Mucous membranes are moist.      Pharynx: Oropharynx is clear.   Eyes:      Extraocular Movements: Extraocular movements intact.      Conjunctiva/sclera: Conjunctivae normal.      Pupils: Pupils are equal, round, and reactive to light.   Cardiovascular:      Rate and Rhythm: Normal rate and regular rhythm.      Pulses: Normal pulses.      Heart sounds: Normal heart sounds.   Pulmonary:      Effort: Pulmonary effort is normal.      Breath sounds: Normal breath sounds.   Abdominal:      General: Abdomen is flat. Bowel sounds are normal.      Palpations: Abdomen is soft.   Musculoskeletal:         General: Normal range of motion.   Skin:     General: Skin is warm.   Neurological:      Mental Status: She is alert and oriented to person, place, and time.   Psychiatric:         Behavior: Behavior normal.

## 2024-05-24 NOTE — LETTER
May 24, 2024     Patient: Brianna Mata   YOB: 1990   Date of Visit: 5/24/2024       To Whom It May Concern:    It is my medical opinion that Brianna Mata may return to work on 5/25/2024 .    If you have any questions or concerns, please don't hesitate to call.         Sincerely,        Clive Salinas Jr, MILES    CC: No Recipients

## 2024-05-27 LAB
ATRIAL RATE: 95 BPM
P AXIS: 69 DEGREES
PR INTERVAL: 134 MS
QRS AXIS: 28 DEGREES
QRSD INTERVAL: 76 MS
QT INTERVAL: 346 MS
QTC INTERVAL: 434 MS
T WAVE AXIS: 44 DEGREES
VENTRICULAR RATE: 95 BPM

## 2024-05-27 PROCEDURE — 93010 ELECTROCARDIOGRAM REPORT: CPT | Performed by: INTERNAL MEDICINE

## 2024-07-06 ENCOUNTER — OFFICE VISIT (OUTPATIENT)
Dept: URGENT CARE | Facility: MEDICAL CENTER | Age: 34
End: 2024-07-06
Payer: COMMERCIAL

## 2024-07-06 VITALS
TEMPERATURE: 98 F | SYSTOLIC BLOOD PRESSURE: 142 MMHG | HEART RATE: 80 BPM | BODY MASS INDEX: 28 KG/M2 | HEIGHT: 63 IN | DIASTOLIC BLOOD PRESSURE: 82 MMHG | WEIGHT: 158 LBS | RESPIRATION RATE: 18 BRPM | OXYGEN SATURATION: 98 %

## 2024-07-06 DIAGNOSIS — R30.0 DYSURIA: Primary | ICD-10-CM

## 2024-07-06 DIAGNOSIS — N30.01 ACUTE CYSTITIS WITH HEMATURIA: ICD-10-CM

## 2024-07-06 LAB
SL AMB  POCT GLUCOSE, UA: ABNORMAL
SL AMB LEUKOCYTE ESTERASE,UA: ABNORMAL
SL AMB POCT BILIRUBIN,UA: ABNORMAL
SL AMB POCT BLOOD,UA: ABNORMAL
SL AMB POCT CLARITY,UA: ABNORMAL
SL AMB POCT COLOR,UA: YELLOW
SL AMB POCT KETONES,UA: ABNORMAL
SL AMB POCT NITRITE,UA: ABNORMAL
SL AMB POCT PH,UA: 7
SL AMB POCT SPECIFIC GRAVITY,UA: 1.02
SL AMB POCT URINE PROTEIN: 100
SL AMB POCT UROBILINOGEN: 0.2

## 2024-07-06 PROCEDURE — 81002 URINALYSIS NONAUTO W/O SCOPE: CPT | Performed by: NURSE PRACTITIONER

## 2024-07-06 PROCEDURE — 87077 CULTURE AEROBIC IDENTIFY: CPT | Performed by: NURSE PRACTITIONER

## 2024-07-06 PROCEDURE — S9088 SERVICES PROVIDED IN URGENT: HCPCS | Performed by: NURSE PRACTITIONER

## 2024-07-06 PROCEDURE — 99213 OFFICE O/P EST LOW 20 MIN: CPT | Performed by: NURSE PRACTITIONER

## 2024-07-06 PROCEDURE — 87086 URINE CULTURE/COLONY COUNT: CPT | Performed by: NURSE PRACTITIONER

## 2024-07-06 RX ORDER — SULFAMETHOXAZOLE AND TRIMETHOPRIM 800; 160 MG/1; MG/1
1 TABLET ORAL EVERY 12 HOURS SCHEDULED
Qty: 6 TABLET | Refills: 0 | Status: SHIPPED | OUTPATIENT
Start: 2024-07-06 | End: 2024-07-09

## 2024-07-06 NOTE — PATIENT INSTRUCTIONS
Start Bactrim x 3 days BID  Continue proper hydration  F/u with PCP ASAP  Proceed to ER should symptoms worsen

## 2024-07-06 NOTE — PROGRESS NOTES
Assessment/Plan    Dysuria [R30.0]  1. Dysuria  POCT urine dip    Urine culture    Urine culture      2. Acute cystitis with hematuria  sulfamethoxazole-trimethoprim (BACTRIM DS) 800-160 mg per tablet        Urine dipstick + for large leukocytes, blood and protein   Start Bactrim x 3 days BID  Continue proper hydration  F/u with PCP ASAP  Proceed to ER should symptoms worsen        Patient ID: Brianna Mata is a 33 y.o. female.      Reason For Visit / Chief Complaint  Chief Complaint   Patient presents with    Female Dysuria     Patient states for 3 weeks she has urinary burning, pressure, frequency          34 y/o female presents for 3 weeks of dysuria, frequency, pelvic discomfort, she has tried OTC medications that helped make it tolerable but the burning keeps coming back. She denies fevers, chills or flank pain. Last UTI was in 2023. Denies any risky sexual behaviors, risks of STI or new sexual partners.         Past Medical History:   Diagnosis Date    Depression     Hypertension     Intracranial hemorrhage (HCC) 2019    Varicella        Past Surgical History:   Procedure Laterality Date     SECTION      MYRINGOTOMY W/ TUBES      [place]  DELIV[place] ONLY N/A 2018    Procedure:  SECTION ();  Surgeon: Gaviota Trevino MD;  Location: [facility] ;  Service: Obstetrics       Family History   Problem Relation Age of Onset    Heart disease Father     Hypertension Father     Spina bifida Cousin     No Known Problems Mother     No Known Problems Brother     No Known Problems Daughter     No Known Problems Son     Breast cancer Neg Hx     Colon cancer Neg Hx     Ovarian cancer Neg Hx     Cervical cancer Neg Hx     Uterine cancer Neg Hx        Review of Systems   Constitutional: Negative.    HENT: Negative.     Eyes: Negative.    Respiratory: Negative.     Cardiovascular: Negative.    Gastrointestinal: Negative.    Endocrine: Negative.    Genitourinary:  Positive for  "dysuria, frequency, hematuria, pelvic pain and urgency. Negative for flank pain, vaginal bleeding and vaginal discharge.   Musculoskeletal: Negative.    Skin: Negative.    Allergic/Immunologic: Negative.    Neurological: Negative.    Hematological: Negative.    Psychiatric/Behavioral: Negative.         Objective:    /82   Pulse 80   Temp 98 °F (36.7 °C) (Temporal)   Resp 18   Ht 5' 3\" (1.6 m)   Wt 71.7 kg (158 lb)   SpO2 98%   BMI 27.99 kg/m²     Physical Exam  Constitutional:       Appearance: Normal appearance.   HENT:      Head: Normocephalic and atraumatic.      Right Ear: External ear normal.      Left Ear: External ear normal.      Mouth/Throat:      Mouth: Mucous membranes are moist.      Pharynx: Oropharynx is clear.   Eyes:      Conjunctiva/sclera: Conjunctivae normal.   Cardiovascular:      Rate and Rhythm: Normal rate and regular rhythm.      Pulses: Normal pulses.      Heart sounds: Normal heart sounds.   Pulmonary:      Effort: Pulmonary effort is normal.      Breath sounds: Normal breath sounds.   Abdominal:      Palpations: Abdomen is soft.      Tenderness: There is no right CVA tenderness or left CVA tenderness.   Musculoskeletal:         General: Normal range of motion.      Cervical back: Normal range of motion.   Skin:     General: Skin is warm and dry.      Capillary Refill: Capillary refill takes less than 2 seconds.   Neurological:      General: No focal deficit present.      Mental Status: She is alert and oriented to person, place, and time.   Psychiatric:         Behavior: Behavior normal.         Thought Content: Thought content normal.               "

## 2024-07-08 LAB — BACTERIA UR CULT: ABNORMAL

## 2024-07-11 DIAGNOSIS — Z30.9 ENCOUNTER FOR CONTRACEPTIVE MANAGEMENT, UNSPECIFIED TYPE: ICD-10-CM

## 2024-07-11 RX ORDER — ACETAMINOPHEN AND CODEINE PHOSPHATE 120; 12 MG/5ML; MG/5ML
1 SOLUTION ORAL DAILY
Qty: 28 TABLET | Refills: 0 | Status: SHIPPED | OUTPATIENT
Start: 2024-07-11

## 2024-08-05 ENCOUNTER — ANNUAL EXAM (OUTPATIENT)
Dept: OBGYN CLINIC | Facility: MEDICAL CENTER | Age: 34
End: 2024-08-05
Payer: COMMERCIAL

## 2024-08-05 VITALS
SYSTOLIC BLOOD PRESSURE: 128 MMHG | WEIGHT: 162 LBS | BODY MASS INDEX: 28.7 KG/M2 | DIASTOLIC BLOOD PRESSURE: 76 MMHG | HEIGHT: 63 IN

## 2024-08-05 DIAGNOSIS — Z30.9 ENCOUNTER FOR CONTRACEPTIVE MANAGEMENT, UNSPECIFIED TYPE: ICD-10-CM

## 2024-08-05 DIAGNOSIS — Z30.41 SURVEILLANCE OF CONTRACEPTIVE PILL: ICD-10-CM

## 2024-08-05 DIAGNOSIS — Z01.419 ENCOUNTER FOR GYNECOLOGICAL EXAMINATION (GENERAL) (ROUTINE) WITHOUT ABNORMAL FINDINGS: Primary | ICD-10-CM

## 2024-08-05 PROCEDURE — 99395 PREV VISIT EST AGE 18-39: CPT | Performed by: CLINICAL NURSE SPECIALIST

## 2024-08-05 RX ORDER — ACETAMINOPHEN AND CODEINE PHOSPHATE 120; 12 MG/5ML; MG/5ML
1 SOLUTION ORAL DAILY
Qty: 84 TABLET | Refills: 4 | Status: SHIPPED | OUTPATIENT
Start: 2024-08-05

## 2024-08-05 NOTE — PROGRESS NOTES
Subjective:      Brianna Mata is a 33 y.o. female. Here for Gynecologic Exam (Pap 2/23/23 -/-/Birth control micronor /Lmp/Gardasil completed /Std testing )      GYN HPI  Menstrual cycle:  Patient reports irregular menstrual pattern, is on POP.  Vaginal c/o: denies  Urinary c/o: denies  Breast complaints:denies    Sexually active: yes with male partner x 4 years. Monogamous. Declines STI screening  Contraception: POP  She reports she feels safe at home.     Dietary calcium/vit D  intake: moderate. Rec to take Vit D supplements  Lifestyle: active at work and with kids, but no formal exercise.    HEALTH MAINTENANCE SCREENINGS:    Last Papanicolaou test:  02/23/2023   History of abnormal pap: No    IMMUNIZATIONS  Gardasil HPV vaccine Was completed    Hereditary Cancer Screening  Cancer-related family history is negative for Breast cancer, Colon cancer, Ovarian cancer, Cervical cancer, and Uterine cancer.    Substance Abuse Screening Completed. See hx and flowsheet.    The following portions of the patient's history were reviewed and updated as appropriate: allergies, current medications, past family history, past medical history, past social history, past surgical history, and problem list.    Review of Systems   Constitutional:  Negative for appetite change, chills, fatigue, fever and unexpected weight change.   HENT: Negative.     Eyes: Negative.    Respiratory:  Negative for chest tightness and shortness of breath.    Cardiovascular:  Negative for chest pain and palpitations.   Gastrointestinal:  Negative for abdominal pain, constipation and vomiting.   Endocrine: Negative for cold intolerance and heat intolerance.   Genitourinary:         As per HPI   Musculoskeletal:  Negative for back pain, joint swelling and neck pain.   Skin:  Negative for color change and rash.   Neurological:  Negative for dizziness, weakness and numbness.   Hematological:  Does not bruise/bleed easily.   Psychiatric/Behavioral: Negative.    "            Objective:  /76 (BP Location: Left arm, Patient Position: Sitting, Cuff Size: Standard)   Ht 5' 3\" (1.6 m)   Wt 73.5 kg (162 lb)   LMP 07/29/2024 (Approximate)   BMI 28.70 kg/m²        Physical Exam  Constitutional:       General: She is not in acute distress.     Appearance: Normal appearance.   Genitourinary:      Vulva and rectum normal.      No lesions in the vagina.      Right Labia: No rash or lesions.     Left Labia: No lesions or rash.     No vaginal discharge, erythema, tenderness or bleeding.        Right Adnexa: not tender and no mass present.     Left Adnexa: not tender and no mass present.     No cervical motion tenderness, discharge or friability.      Uterus is not enlarged or tender.      No urethral prolapse present.      Pelvic exam was performed with patient in the lithotomy position.   Breasts:     Breasts are symmetrical.      Right: No inverted nipple, mass, nipple discharge, skin change or tenderness.      Left: No inverted nipple, mass, nipple discharge, skin change or tenderness.   HENT:      Head: Normocephalic and atraumatic.   Cardiovascular:      Rate and Rhythm: Normal rate.      Heart sounds: No murmur heard.  Pulmonary:      Effort: Pulmonary effort is normal.      Breath sounds: Normal breath sounds.   Abdominal:      General: There is no distension.      Palpations: Abdomen is soft.      Tenderness: There is no abdominal tenderness.   Musculoskeletal:         General: Normal range of motion.      Cervical back: Normal range of motion.   Lymphadenopathy:      Cervical: No cervical adenopathy.   Neurological:      Mental Status: She is alert and oriented to person, place, and time.   Skin:     General: Skin is warm and dry.   Psychiatric:         Mood and Affect: Mood normal.         Behavior: Behavior normal.   Vitals reviewed.             Assessment/Plan:           ANNUAL GYN EXAM- Primary  Annual GYN examination completed today.   Health maintenance " reviewed/updated as appropriate  Cervical cancer screen: Previous pap smears and ASCCP screening guidelines have been reviewed. Pap due in 2028.  Breast Health: Encouraged regular self breast exams.   Risk prevention and anticipatory guidance provided including:  Age related Calcium and vitamin D intake  Dietary and lifestyle recommendations based on her age and weight. body mass index is 28.7 kg/m²..    Tobacco and alcohol use, intervention ordered if applicable.   Condom use for prevention of STI's. declined STI Screening.  Contraception:  Currently on POP . No signficant adverse effects. Desires to continue.     Problem List Items Addressed This Visit    None  Visit Diagnoses       Encounter for gynecological examination (general) (routine) without abnormal findings    -  Primary    Surveillance of contraceptive pill        Encounter for contraceptive management, unspecified type        Relevant Medications    norethindrone (MICRONOR) 0.35 MG tablet            No orders of the defined types were placed in this encounter.

## 2024-08-29 ENCOUNTER — OFFICE VISIT (OUTPATIENT)
Dept: FAMILY MEDICINE CLINIC | Facility: CLINIC | Age: 34
End: 2024-08-29
Payer: COMMERCIAL

## 2024-08-29 VITALS
OXYGEN SATURATION: 97 % | WEIGHT: 161.4 LBS | HEIGHT: 63 IN | BODY MASS INDEX: 28.6 KG/M2 | DIASTOLIC BLOOD PRESSURE: 92 MMHG | HEART RATE: 80 BPM | SYSTOLIC BLOOD PRESSURE: 132 MMHG | TEMPERATURE: 98 F

## 2024-08-29 DIAGNOSIS — I10 ESSENTIAL HYPERTENSION: Primary | ICD-10-CM

## 2024-08-29 PROCEDURE — 99214 OFFICE O/P EST MOD 30 MIN: CPT | Performed by: FAMILY MEDICINE

## 2024-08-29 RX ORDER — AMLODIPINE BESYLATE 5 MG/1
5 TABLET ORAL DAILY
Qty: 90 TABLET | Refills: 0 | Status: SHIPPED | OUTPATIENT
Start: 2024-08-29

## 2024-08-29 NOTE — PROGRESS NOTES
Assessment/Plan:    1. Essential hypertension  Comments:  start amlodipine, recheck in 4 -6 weeks  Orders:  -     amLODIPine (NORVASC) 5 mg tablet; Take 1 tablet (5 mg total) by mouth daily      There are no Patient Instructions on file for this visit.    Return in about 2 months (around 10/29/2024).    Subjective:      Patient ID: Brianna Mata is a 33 y.o. female.    Chief Complaint   Patient presents with    Follow-up       Here for BP, 157/117, the best she's seen is 107/80's. Sx with higher BP, migraines, fuzzy brain. Lots of pressure in the head. Fam h/o HTN. Unsure what meds they were on. Caffeine 1 cup a day, has already back back from higher intake. Has been treated for pre-eclampsia during her pregnancy for twins in 2018. Went down slightly but still never got back to normal after birth. Has been on wellbutrin for depression for many years. Stress and heat are triggers. Avoiding extra salt when she can bc she knows it will raise the BP.  H/o ADHD but not on meds right now, doesn't want to aggravate her BP.         The following portions of the patient's history were reviewed and updated as appropriate: allergies, current medications, past family history, past medical history, past social history, past surgical history and problem list.    Review of Systems   Constitutional:  Negative for fatigue and fever.   HENT:  Negative for congestion.    Eyes:  Positive for visual disturbance.   Respiratory:  Positive for chest tightness and shortness of breath.    Cardiovascular:  Negative for chest pain and palpitations.   Gastrointestinal:  Negative for abdominal pain.   Genitourinary:  Negative for difficulty urinating.   Musculoskeletal:  Negative for arthralgias.   Neurological:  Positive for headaches.   Hematological:  Does not bruise/bleed easily.         Current Outpatient Medications   Medication Sig Dispense Refill    albuterol (PROVENTIL HFA,VENTOLIN HFA) 90 mcg/act inhaler Inhale 2 puffs every 6 (six)  "hours as needed for wheezing 8.5 g 1    amLODIPine (NORVASC) 5 mg tablet Take 1 tablet (5 mg total) by mouth daily 90 tablet 0    buPROPion (WELLBUTRIN XL) 300 mg 24 hr tablet Take 1 tablet (300 mg total) by mouth daily 30 tablet 5    fluticasone (FLONASE) 50 mcg/act nasal spray 2 sprays into each nostril      hydrOXYzine pamoate (VISTARIL) 25 mg capsule Take 1 capsule (25 mg total) by mouth 3 (three) times a day as needed for anxiety 90 capsule 1    meloxicam (MOBIC) 15 mg tablet Take 1 tablet (15 mg total) by mouth daily as needed for moderate pain 60 tablet 0    methocarbamol (ROBAXIN) 500 mg tablet Take 1 tablet (500 mg total) by mouth 3 (three) times a day as needed for muscle spasms 30 tablet 0    norethindrone (MICRONOR) 0.35 MG tablet Take 1 tablet (0.35 mg total) by mouth daily 84 tablet 4     No current facility-administered medications for this visit.       Objective:    /92 (BP Location: Left arm, Patient Position: Sitting, Cuff Size: Large)   Pulse 80   Temp 98 °F (36.7 °C) (Temporal)   Ht 5' 3\" (1.6 m)   Wt 73.2 kg (161 lb 6.4 oz)   LMP 07/29/2024 (Approximate)   SpO2 97%   Breastfeeding No   BMI 28.59 kg/m²        Physical Exam  Vitals reviewed.   Constitutional:       Appearance: Normal appearance. She is well-developed.      Comments: Pt BP cuff 150/107.   Cardiovascular:      Rate and Rhythm: Normal rate and regular rhythm.      Heart sounds: Normal heart sounds.   Pulmonary:      Effort: Pulmonary effort is normal.      Breath sounds: Normal breath sounds.   Musculoskeletal:      Right lower leg: No edema.      Left lower leg: No edema.   Skin:     General: Skin is warm.   Neurological:      General: No focal deficit present.      Mental Status: She is alert and oriented to person, place, and time.   Psychiatric:         Mood and Affect: Mood normal.         Behavior: Behavior normal.         Thought Content: Thought content normal.         Judgment: Judgment normal.            "     Dana Swartz MD

## 2024-09-18 DIAGNOSIS — S89.90XS KNEE INJURY, UNSPECIFIED LATERALITY, SEQUELA: ICD-10-CM

## 2024-09-18 DIAGNOSIS — F41.9 ANXIETY: ICD-10-CM

## 2024-09-19 RX ORDER — METHOCARBAMOL 500 MG/1
TABLET, FILM COATED ORAL
Qty: 30 TABLET | Refills: 0 | Status: SHIPPED | OUTPATIENT
Start: 2024-09-19

## 2024-09-19 RX ORDER — HYDROXYZINE PAMOATE 25 MG/1
CAPSULE ORAL
Qty: 90 CAPSULE | Refills: 5 | Status: SHIPPED | OUTPATIENT
Start: 2024-09-19

## 2024-10-04 ENCOUNTER — TELEPHONE (OUTPATIENT)
Dept: FAMILY MEDICINE CLINIC | Facility: CLINIC | Age: 34
End: 2024-10-04

## 2024-12-16 DIAGNOSIS — I10 ESSENTIAL HYPERTENSION: ICD-10-CM

## 2024-12-17 RX ORDER — AMLODIPINE BESYLATE 5 MG/1
5 TABLET ORAL DAILY
Qty: 90 TABLET | Refills: 1 | Status: SHIPPED | OUTPATIENT
Start: 2024-12-17

## 2025-02-10 ENCOUNTER — OFFICE VISIT (OUTPATIENT)
Dept: URGENT CARE | Facility: MEDICAL CENTER | Age: 35
End: 2025-02-10
Payer: COMMERCIAL

## 2025-02-10 VITALS
TEMPERATURE: 98.7 F | OXYGEN SATURATION: 96 % | SYSTOLIC BLOOD PRESSURE: 138 MMHG | DIASTOLIC BLOOD PRESSURE: 88 MMHG | RESPIRATION RATE: 18 BRPM | HEART RATE: 90 BPM

## 2025-02-10 DIAGNOSIS — R68.89 FLU-LIKE SYMPTOMS: Primary | ICD-10-CM

## 2025-02-10 PROCEDURE — 99214 OFFICE O/P EST MOD 30 MIN: CPT | Performed by: PHYSICIAN ASSISTANT

## 2025-02-10 PROCEDURE — 87636 SARSCOV2 & INF A&B AMP PRB: CPT | Performed by: PHYSICIAN ASSISTANT

## 2025-02-10 PROCEDURE — S9088 SERVICES PROVIDED IN URGENT: HCPCS | Performed by: PHYSICIAN ASSISTANT

## 2025-02-10 RX ORDER — OSELTAMIVIR PHOSPHATE 75 MG/1
75 CAPSULE ORAL 2 TIMES DAILY
Qty: 10 CAPSULE | Refills: 0 | Status: SHIPPED | OUTPATIENT
Start: 2025-02-10 | End: 2025-02-15

## 2025-02-10 NOTE — LETTER
February 10, 2025     Patient: Brianna Mata   YOB: 1990   Date of Visit: 2/10/2025       To Whom it May Concern:    Brianna Mata was seen in my clinic on 2/10/2025. She may return to work when she is fever free x 24 hours without fever reducing medication.    If you have any questions or concerns, please don't hesitate to call.         Sincerely,          Mark Miner PA-C        CC: No Recipients

## 2025-02-10 NOTE — PATIENT INSTRUCTIONS
Flulike symptoms  Tamiflu as directed-may stop Tamiflu if flu test is negative  Over-the-counter medication for symptom relief  Follow up with PCP in 3-5 days.  Proceed to  ER if symptoms worsen.

## 2025-02-10 NOTE — PROGRESS NOTES
[facility]        NAME: Brianna Mata is a 34 y.o. female  : 1990    MRN: [id number]  DATE: February 10, 2025  TIME: 6:24 PM    Assessment and Plan   Flu-like symptoms [R68.89]  1. Flu-like symptoms  oseltamivir (TAMIFLU) 75 mg capsule            Patient Instructions     Flulike symptoms  Tamiflu as directed-may stop Tamiflu if flu test is negative  Over-the-counter medication for symptom relief  Follow up with PCP in 3-5 days.  Proceed to  ER if symptoms worsen.    Chief Complaint     Chief Complaint   Patient presents with    Sinusitis     Patient has sinus pressure, headache, lightheadedness; 2 days of symptoms          History of Present Illness       34-year-old female who presents complaining of cough, congestion, runny nose, sinus pressure, fevers x 2 days.  Denies chest pain, shortness of breath.    Sinusitis  Associated symptoms include chills, congestion, coughing and ear pain. Pertinent negatives include no diaphoresis, shortness of breath, sinus pressure, sneezing or sore throat.       Review of Systems   Review of Systems   Constitutional:  Positive for chills and fever. Negative for activity change, appetite change, diaphoresis and fatigue.   HENT:  Positive for congestion, ear pain and rhinorrhea. Negative for ear discharge, facial swelling, hearing loss, mouth sores, nosebleeds, postnasal drip, sinus pressure, sinus pain, sneezing, sore throat and voice change.    Respiratory:  Positive for cough. Negative for apnea, choking, chest tightness, shortness of breath, wheezing and stridor.    Cardiovascular: Negative.          Current Medications       Current Outpatient Medications:     oseltamivir (TAMIFLU) 75 mg capsule, Take 1 capsule (75 mg total) by mouth 2 (two) times a day for 5 days, Disp: 10 capsule, Rfl: 0    albuterol (PROVENTIL HFA,VENTOLIN HFA) 90 mcg/act inhaler, Inhale 2 puffs every 6 (six) hours as needed for wheezing, Disp: 8.5 g, Rfl: 1    amLODIPine (NORVASC) 5 mg  tablet, TAKE 1 TABLET (5 MG TOTAL) BY MOUTH DAILY., Disp: 90 tablet, Rfl: 1    buPROPion (WELLBUTRIN XL) 300 mg 24 hr tablet, Take 1 tablet (300 mg total) by mouth daily, Disp: 30 tablet, Rfl: 5    fluticasone (FLONASE) 50 mcg/act nasal spray, 2 sprays into each nostril, Disp: , Rfl:     hydrOXYzine pamoate (VISTARIL) 25 mg capsule, TAKE 1 CAPSULE BY MOUTH 3 TIMES A DAY AS NEEDED FOR ANXIETY., Disp: 90 capsule, Rfl: 5    meloxicam (MOBIC) 15 mg tablet, Take 1 tablet (15 mg total) by mouth daily as needed for moderate pain, Disp: 60 tablet, Rfl: 0    methocarbamol (ROBAXIN) 500 mg tablet, TAKE 1 TABLET BY MOUTH 3 TIMES A DAY AS NEEDED FOR MUSCLE SPASMS., Disp: 30 tablet, Rfl: 0    norethindrone (MICRONOR) 0.35 MG tablet, Take 1 tablet (0.35 mg total) by mouth daily, Disp: 84 tablet, Rfl: 4    Current Allergies     Allergies as of 02/10/2025 - Reviewed 02/10/2025   Allergen Reaction Noted    Doxycycline Other (See Comments) 2023    Poison ivy extract Rash 2015            The following portions of the patient's history were reviewed and updated as appropriate: allergies, current medications, past family history, past medical history, past social history, past surgical history and problem list.     Past Medical History:   Diagnosis Date    Depression     Hypertension     Intracranial hemorrhage (HCC) 2019    Varicella        Past Surgical History:   Procedure Laterality Date     SECTION      MYRINGOTOMY W/ TUBES      [place]  DELIVERY ONLY N/A 2018    Procedure:  SECTION ();  Surgeon: Gaviota Trevino MD;  Location: [facility];  Service: Obstetrics       Family History   Problem Relation Age of Onset    Heart disease Father     Hypertension Father     Spina bifida Cousin     No Known Problems Mother     No Known Problems Brother     No Known Problems Daughter     No Known Problems Son     Breast cancer Neg Hx     Colon cancer Neg Hx     Ovarian cancer Neg Hx      Cervical cancer Neg Hx     Uterine cancer Neg Hx          Medications have been verified.        Objective   /88   Pulse 90   Temp 98.7 °F (37.1 °C) (Temporal)   Resp 18   SpO2 96%        Physical Exam     Physical Exam  Constitutional:       General: She is not in acute distress.     Appearance: She is well-developed. She is not diaphoretic.   HENT:      Head: Normocephalic and atraumatic.      Right Ear: Hearing, tympanic membrane, ear canal and external ear normal.      Left Ear: Hearing, tympanic membrane, ear canal and external ear normal.      Nose: Rhinorrhea present.      Mouth/Throat:      Pharynx: Uvula midline.   Cardiovascular:      Rate and Rhythm: Normal rate and regular rhythm.      Heart sounds: Normal heart sounds.   Pulmonary:      Effort: Pulmonary effort is normal.      Breath sounds: Normal breath sounds.   Musculoskeletal:      Cervical back: Normal range of motion and neck supple.   Lymphadenopathy:      Cervical: Cervical adenopathy present.

## 2025-02-11 ENCOUNTER — RESULTS FOLLOW-UP (OUTPATIENT)
Dept: URGENT CARE | Facility: MEDICAL CENTER | Age: 35
End: 2025-02-11

## 2025-02-11 LAB
FLUAV RNA RESP QL NAA+PROBE: NEGATIVE
FLUBV RNA RESP QL NAA+PROBE: NEGATIVE
SARS-COV-2 RNA RESP QL NAA+PROBE: NEGATIVE

## 2025-03-31 DIAGNOSIS — F41.9 ANXIETY: ICD-10-CM

## 2025-04-01 RX ORDER — HYDROXYZINE PAMOATE 25 MG/1
25 CAPSULE ORAL 3 TIMES DAILY PRN
Qty: 90 CAPSULE | Refills: 0 | Status: SHIPPED | OUTPATIENT
Start: 2025-04-01

## 2025-04-02 ENCOUNTER — OFFICE VISIT (OUTPATIENT)
Dept: FAMILY MEDICINE CLINIC | Facility: CLINIC | Age: 35
End: 2025-04-02
Payer: COMMERCIAL

## 2025-04-02 VITALS
DIASTOLIC BLOOD PRESSURE: 82 MMHG | WEIGHT: 171 LBS | SYSTOLIC BLOOD PRESSURE: 104 MMHG | HEIGHT: 63 IN | TEMPERATURE: 98.1 F | HEART RATE: 91 BPM | BODY MASS INDEX: 30.3 KG/M2 | RESPIRATION RATE: 16 BRPM | OXYGEN SATURATION: 97 %

## 2025-04-02 DIAGNOSIS — I10 ESSENTIAL HYPERTENSION: Primary | ICD-10-CM

## 2025-04-02 PROCEDURE — 99213 OFFICE O/P EST LOW 20 MIN: CPT | Performed by: INTERNAL MEDICINE

## 2025-04-02 PROCEDURE — 93000 ELECTROCARDIOGRAM COMPLETE: CPT | Performed by: INTERNAL MEDICINE

## 2025-04-02 RX ORDER — LISINOPRIL 10 MG/1
10 TABLET ORAL DAILY
Qty: 90 TABLET | Refills: 1 | Status: SHIPPED | OUTPATIENT
Start: 2025-04-02

## 2025-04-02 NOTE — ASSESSMENT & PLAN NOTE
Patient was on lisinopril briefly somewhere because her blood pressure was high but she heard that it could cause seizures so she just stopped it.  At home she always runs a high blood pressure but in the office not as much.  Will try her on the low-dose of lisinopril and have her rechecked I also noticed that she has not had any recent blood work and also will check a CMP and a lipid profile    Orders:    lisinopril (ZESTRIL) 10 mg tablet; Take 1 tablet (10 mg total) by mouth daily    Lipid panel; Future    Comprehensive metabolic panel; Future

## 2025-04-02 NOTE — PROGRESS NOTES
Name: Brianna Mata      : 1990      MRN: [id number]  Encounter Provider: Jes Richardson MD  Encounter Date: 2025   Encounter department: [facility]  :  Assessment & Plan  Essential hypertension  Patient was on lisinopril briefly somewhere because her blood pressure was high but she heard that it could cause seizures so she just stopped it.  At home she always runs a high blood pressure but in the office not as much.  Will try her on the low-dose of lisinopril and have her rechecked I also noticed that she has not had any recent blood work and also will check a CMP and a lipid profile    Orders:    lisinopril (ZESTRIL) 10 mg tablet; Take 1 tablet (10 mg total) by mouth daily    Lipid panel; Future    Comprehensive metabolic panel; Future           History of Present Illness   Hypertension  This is a recurrent problem. The current episode started more than 1 year ago. The problem has been waxing and waning since onset. The problem is uncontrolled. Associated symptoms include anxiety. Pertinent negatives include no chest pain, headaches, palpitations, peripheral edema or shortness of breath. There are no associated agents to hypertension. There are no known risk factors for coronary artery disease. Past treatments include ACE inhibitors. Improvement on treatment: unclear. Hypertensive end-organ damage includes retinopathy. There is no history of angina, kidney disease, CVA or PVD. no.     Review of Systems   Constitutional: Negative.    HENT: Negative.     Eyes: Negative.    Respiratory:  Negative for shortness of breath.    Cardiovascular:  Negative for chest pain and palpitations.   Gastrointestinal: Negative.    Endocrine: Negative.    Genitourinary: Negative.    Musculoskeletal: Negative.    Neurological:  Negative for seizures and headaches.   Psychiatric/Behavioral:  The patient is nervous/anxious.        Objective   /82 (BP Location: Right arm, Patient Position: Sitting, Cuff  "Size: Large)   Pulse 91   Temp 98.1 °F (36.7 °C) (Temporal)   Resp 16   Ht 5' 3\" (1.6 m)   Wt 77.6 kg (171 lb)   LMP 03/20/2025 (Exact Date)   SpO2 97%   BMI 30.29 kg/m²      Physical Exam  Vitals and nursing note reviewed.   Constitutional:       General: She is not in acute distress.     Appearance: She is well-developed.   HENT:      Head: Normocephalic and atraumatic.   Eyes:      Extraocular Movements: Extraocular movements intact.      Conjunctiva/sclera: Conjunctivae normal.      Pupils: Pupils are equal, round, and reactive to light.   Cardiovascular:      Rate and Rhythm: Regular rhythm. Tachycardia present.      Pulses: Normal pulses.      Heart sounds: No murmur heard.  Pulmonary:      Effort: Pulmonary effort is normal. No respiratory distress.      Breath sounds: Normal breath sounds.   Abdominal:      Palpations: Abdomen is soft.      Tenderness: There is no abdominal tenderness.   Musculoskeletal:         General: No swelling.      Cervical back: Neck supple.   Skin:     General: Skin is warm and dry.      Capillary Refill: Capillary refill takes less than 2 seconds.   Neurological:      General: No focal deficit present.      Mental Status: She is alert.   Psychiatric:         Mood and Affect: Mood normal.      Comments: anxious         "

## 2025-05-01 DIAGNOSIS — F41.9 ANXIETY: ICD-10-CM

## 2025-05-01 RX ORDER — HYDROXYZINE PAMOATE 25 MG/1
CAPSULE ORAL
Qty: 90 CAPSULE | Refills: 0 | Status: SHIPPED | OUTPATIENT
Start: 2025-05-01

## 2025-08-11 ENCOUNTER — OFFICE VISIT (OUTPATIENT)
Age: 35
End: 2025-08-11
Payer: COMMERCIAL

## (undated) DEVICE — PACK C-SECTION PBDS

## (undated) DEVICE — Device

## (undated) DEVICE — GLOVE INDICATOR PI UNDERGLOVE SZ 6.5 BLUE

## (undated) DEVICE — GLOVE SRG BIOGEL ECLIPSE 6.5

## (undated) DEVICE — SUT VICRYL 0 CT-1 36 IN J946H

## (undated) DEVICE — SUT VICRYL 0 CTX 36 IN J978H

## (undated) DEVICE — ADHESIVE SKN CLSR HISTOACRYL FLEX 0.5ML LF

## (undated) DEVICE — SKIN MARKER DUAL TIP WITH RULER CAP, FLEXIBLE RULER AND LABELS: Brand: DEVON